# Patient Record
Sex: MALE | Race: WHITE | NOT HISPANIC OR LATINO | Employment: FULL TIME | URBAN - METROPOLITAN AREA
[De-identification: names, ages, dates, MRNs, and addresses within clinical notes are randomized per-mention and may not be internally consistent; named-entity substitution may affect disease eponyms.]

---

## 2017-03-06 ENCOUNTER — COMMUNICATION - HEALTHEAST (OUTPATIENT)
Dept: CARDIOLOGY | Facility: CLINIC | Age: 57
End: 2017-03-06

## 2017-03-07 ENCOUNTER — OFFICE VISIT - HEALTHEAST (OUTPATIENT)
Dept: FAMILY MEDICINE | Facility: CLINIC | Age: 57
End: 2017-03-07

## 2017-03-07 DIAGNOSIS — R55 SYNCOPE: ICD-10-CM

## 2017-03-07 DIAGNOSIS — I25.10 CARDIOVASCULAR DISEASE: ICD-10-CM

## 2017-03-07 DIAGNOSIS — R06.83 SNORING: ICD-10-CM

## 2017-03-15 ENCOUNTER — HOSPITAL ENCOUNTER (OUTPATIENT)
Dept: NUCLEAR MEDICINE | Facility: CLINIC | Age: 57
Discharge: HOME | End: 2017-03-15
Attending: FAMILY MEDICINE

## 2017-03-15 ENCOUNTER — HOSPITAL ENCOUNTER (OUTPATIENT)
Dept: CARDIOLOGY | Facility: CLINIC | Age: 57
Discharge: HOME | End: 2017-03-15
Attending: FAMILY MEDICINE

## 2017-03-15 ENCOUNTER — COMMUNICATION - HEALTHEAST (OUTPATIENT)
Dept: TELEHEALTH | Facility: CLINIC | Age: 57
End: 2017-03-15

## 2017-03-15 ENCOUNTER — AMBULATORY - HEALTHEAST (OUTPATIENT)
Dept: FAMILY MEDICINE | Facility: CLINIC | Age: 57
End: 2017-03-15

## 2017-03-15 ENCOUNTER — COMMUNICATION - HEALTHEAST (OUTPATIENT)
Dept: ADMINISTRATIVE | Facility: CLINIC | Age: 57
End: 2017-03-15

## 2017-03-15 DIAGNOSIS — R94.39 ABNORMAL STRESS TEST: ICD-10-CM

## 2017-03-15 DIAGNOSIS — I25.10 CARDIOVASCULAR DISEASE: ICD-10-CM

## 2017-03-15 LAB
CV STRESS CURRENT BP HE: NORMAL
CV STRESS CURRENT HR HE: 101
CV STRESS CURRENT HR HE: 102
CV STRESS CURRENT HR HE: 103
CV STRESS CURRENT HR HE: 103
CV STRESS CURRENT HR HE: 108
CV STRESS CURRENT HR HE: 118
CV STRESS CURRENT HR HE: 118
CV STRESS CURRENT HR HE: 120
CV STRESS CURRENT HR HE: 124
CV STRESS CURRENT HR HE: 125
CV STRESS CURRENT HR HE: 127
CV STRESS CURRENT HR HE: 137
CV STRESS CURRENT HR HE: 137
CV STRESS CURRENT HR HE: 138
CV STRESS CURRENT HR HE: 146
CV STRESS CURRENT HR HE: 147
CV STRESS CURRENT HR HE: 147
CV STRESS CURRENT HR HE: 152
CV STRESS CURRENT HR HE: 152
CV STRESS CURRENT HR HE: 62
CV STRESS CURRENT HR HE: 66
CV STRESS CURRENT HR HE: 79
CV STRESS CURRENT HR HE: 84
CV STRESS CURRENT HR HE: 86
CV STRESS CURRENT HR HE: 86
CV STRESS CURRENT HR HE: 87
CV STRESS CURRENT HR HE: 88
CV STRESS CURRENT HR HE: 90
CV STRESS CURRENT HR HE: 91
CV STRESS CURRENT HR HE: 95
CV STRESS CURRENT HR HE: 99
CV STRESS DEVIATION TIME HE: NORMAL
CV STRESS ECHO PERCENT HR HE: NORMAL
CV STRESS EXERCISE STAGE HE: NORMAL
CV STRESS EXERCISE STAGE REACHED HE: NORMAL
CV STRESS FINAL RESTING BP HE: NORMAL
CV STRESS FINAL RESTING HR HE: 88
CV STRESS MAX HR HE: 152
CV STRESS MAX TREADMILL GRADE HE: 16
CV STRESS MAX TREADMILL SPEED HE: 4.2
CV STRESS PEAK DIA BP HE: NORMAL
CV STRESS PEAK SYS BP HE: NORMAL
CV STRESS PHASE HE: NORMAL
CV STRESS PROTOCOL HE: NORMAL
CV STRESS RESTING PT POSITION HE: NORMAL
CV STRESS RESTING PT POSITION HE: NORMAL
CV STRESS ST DEVIATION AMOUNT HE: NORMAL
CV STRESS ST DEVIATION ELEVATION HE: NORMAL
CV STRESS ST EVELATION AMOUNT HE: NORMAL
CV STRESS TEST TYPE HE: NORMAL
CV STRESS TOTAL STAGE TIME MIN 1 HE: NORMAL
NUC STRESS EJECTION FRACTION: 59 %
STRESS ECHO BASELINE BP: NORMAL
STRESS ECHO BASELINE HR: 64
STRESS ECHO CALCULATED PERCENT HR: 94 %
STRESS ECHO LAST STRESS BP: NORMAL
STRESS ECHO LAST STRESS HR: 152
STRESS ECHO POST ESTIMATED WORKLOAD: 12.1
STRESS ECHO POST EXERCISE DUR MIN: 11
STRESS ECHO POST EXERCISE DUR SEC: 59
STRESS ECHO TARGET HR: 138

## 2017-03-16 ENCOUNTER — COMMUNICATION - HEALTHEAST (OUTPATIENT)
Dept: FAMILY MEDICINE | Facility: CLINIC | Age: 57
End: 2017-03-16

## 2017-03-16 ENCOUNTER — OFFICE VISIT - HEALTHEAST (OUTPATIENT)
Dept: CARDIOLOGY | Facility: CLINIC | Age: 57
End: 2017-03-16

## 2017-03-16 DIAGNOSIS — I25.10 CORONARY ARTERY DISEASE INVOLVING NATIVE CORONARY ARTERY OF NATIVE HEART WITHOUT ANGINA PECTORIS: ICD-10-CM

## 2017-03-16 DIAGNOSIS — R55 SYNCOPE: ICD-10-CM

## 2017-03-16 DIAGNOSIS — E78.2 MIXED HYPERLIPIDEMIA: ICD-10-CM

## 2017-03-16 DIAGNOSIS — I10 ESSENTIAL HYPERTENSION: ICD-10-CM

## 2017-03-16 DIAGNOSIS — R94.39 ABNORMAL NUCLEAR STRESS TEST: ICD-10-CM

## 2017-03-16 ASSESSMENT — MIFFLIN-ST. JEOR: SCORE: 1733.86

## 2017-03-30 ENCOUNTER — COMMUNICATION - HEALTHEAST (OUTPATIENT)
Dept: CARDIOLOGY | Facility: CLINIC | Age: 57
End: 2017-03-30

## 2017-04-04 ENCOUNTER — HOSPITAL ENCOUNTER (OUTPATIENT)
Dept: CT IMAGING | Facility: CLINIC | Age: 57
Discharge: HOME | End: 2017-04-04
Attending: INTERNAL MEDICINE

## 2017-04-04 DIAGNOSIS — R94.39 ABNORMAL NUCLEAR STRESS TEST: ICD-10-CM

## 2017-04-04 LAB
BSA FOR ECHO PROCEDURE: 2.09 M2
Lab: 4.1 CM

## 2017-04-04 ASSESSMENT — MIFFLIN-ST. JEOR: SCORE: 1690.76

## 2017-04-07 ENCOUNTER — COMMUNICATION - HEALTHEAST (OUTPATIENT)
Dept: CARDIOLOGY | Facility: CLINIC | Age: 57
End: 2017-04-07

## 2017-04-07 DIAGNOSIS — I25.10 CAD (CORONARY ARTERY DISEASE): ICD-10-CM

## 2017-04-10 ENCOUNTER — SURGERY - HEALTHEAST (OUTPATIENT)
Dept: CARDIOLOGY | Facility: CLINIC | Age: 57
End: 2017-04-10

## 2017-04-10 ENCOUNTER — COMMUNICATION - HEALTHEAST (OUTPATIENT)
Dept: CARDIOLOGY | Facility: CLINIC | Age: 57
End: 2017-04-10

## 2017-04-14 ENCOUNTER — COMMUNICATION - HEALTHEAST (OUTPATIENT)
Dept: CARDIOLOGY | Facility: CLINIC | Age: 57
End: 2017-04-14

## 2017-04-14 ENCOUNTER — SURGERY - HEALTHEAST (OUTPATIENT)
Dept: CARDIOLOGY | Facility: CLINIC | Age: 57
End: 2017-04-14

## 2017-04-14 DIAGNOSIS — I25.10 CAD (CORONARY ARTERY DISEASE): ICD-10-CM

## 2017-04-14 ASSESSMENT — MIFFLIN-ST. JEOR: SCORE: 1717.13

## 2017-04-21 ENCOUNTER — OFFICE VISIT - HEALTHEAST (OUTPATIENT)
Dept: FAMILY MEDICINE | Facility: CLINIC | Age: 57
End: 2017-04-21

## 2017-04-21 ENCOUNTER — OFFICE VISIT - HEALTHEAST (OUTPATIENT)
Dept: SLEEP MEDICINE | Facility: CLINIC | Age: 57
End: 2017-04-21

## 2017-04-21 DIAGNOSIS — G47.8 SLEEP DYSFUNCTION WITH SLEEP STAGE DISTURBANCE: ICD-10-CM

## 2017-04-21 DIAGNOSIS — G47.10 HYPERSOMNIA, UNSPECIFIED: ICD-10-CM

## 2017-04-21 DIAGNOSIS — I25.10 CAD (CORONARY ARTERY DISEASE): ICD-10-CM

## 2017-04-21 DIAGNOSIS — K76.9 LIVER LESION: ICD-10-CM

## 2017-04-21 DIAGNOSIS — R06.83 SNORING: ICD-10-CM

## 2017-04-21 DIAGNOSIS — R29.818 SUSPECTED SLEEP APNEA: ICD-10-CM

## 2017-04-21 ASSESSMENT — MIFFLIN-ST. JEOR
SCORE: 1720.71
SCORE: 1720.25

## 2017-05-03 ENCOUNTER — HOSPITAL ENCOUNTER (OUTPATIENT)
Dept: MRI IMAGING | Facility: CLINIC | Age: 57
Discharge: HOME | End: 2017-05-03
Attending: FAMILY MEDICINE

## 2017-05-03 DIAGNOSIS — K76.9 LIVER LESION: ICD-10-CM

## 2017-05-08 ENCOUNTER — COMMUNICATION - HEALTHEAST (OUTPATIENT)
Dept: SLEEP MEDICINE | Facility: CLINIC | Age: 57
End: 2017-05-08

## 2017-05-08 DIAGNOSIS — R29.818 SUSPECTED SLEEP APNEA: ICD-10-CM

## 2017-05-08 DIAGNOSIS — G47.10 HYPERSOMNIA: ICD-10-CM

## 2017-05-23 ENCOUNTER — OFFICE VISIT - HEALTHEAST (OUTPATIENT)
Dept: FAMILY MEDICINE | Facility: CLINIC | Age: 57
End: 2017-05-23

## 2017-05-23 DIAGNOSIS — B35.1 ONYCHOMYCOSIS: ICD-10-CM

## 2017-05-23 DIAGNOSIS — L60.0 INGROWN NAIL: ICD-10-CM

## 2017-05-23 DIAGNOSIS — I25.10 CORONARY ARTERY DISEASE INVOLVING NATIVE CORONARY ARTERY OF NATIVE HEART, ANGINA PRESENCE UNSPECIFIED: ICD-10-CM

## 2017-05-23 ASSESSMENT — MIFFLIN-ST. JEOR: SCORE: 1729.32

## 2017-06-01 ENCOUNTER — RECORDS - HEALTHEAST (OUTPATIENT)
Dept: SLEEP MEDICINE | Facility: CLINIC | Age: 57
End: 2017-06-01

## 2017-06-01 DIAGNOSIS — G47.10 HYPERSOMNIA, UNSPECIFIED: ICD-10-CM

## 2017-06-01 DIAGNOSIS — G47.30 SLEEP APNEA, UNSPECIFIED: ICD-10-CM

## 2017-06-05 ENCOUNTER — COMMUNICATION - HEALTHEAST (OUTPATIENT)
Dept: SLEEP MEDICINE | Facility: CLINIC | Age: 57
End: 2017-06-05

## 2017-06-05 DIAGNOSIS — G47.33 OBSTRUCTIVE SLEEP APNEA: ICD-10-CM

## 2017-06-05 DIAGNOSIS — G47.10 HYPERSOMNIA: ICD-10-CM

## 2017-06-06 ENCOUNTER — COMMUNICATION - HEALTHEAST (OUTPATIENT)
Dept: SLEEP MEDICINE | Facility: CLINIC | Age: 57
End: 2017-06-06

## 2017-06-06 DIAGNOSIS — G47.33 OBSTRUCTIVE SLEEP APNEA: ICD-10-CM

## 2017-06-21 ENCOUNTER — RECORDS - HEALTHEAST (OUTPATIENT)
Dept: SLEEP MEDICINE | Facility: CLINIC | Age: 57
End: 2017-06-21

## 2017-06-21 DIAGNOSIS — G47.33 OBSTRUCTIVE SLEEP APNEA (ADULT) (PEDIATRIC): ICD-10-CM

## 2017-06-21 DIAGNOSIS — G47.10 HYPERSOMNIA, UNSPECIFIED: ICD-10-CM

## 2017-06-26 ENCOUNTER — COMMUNICATION - HEALTHEAST (OUTPATIENT)
Dept: SLEEP MEDICINE | Facility: CLINIC | Age: 57
End: 2017-06-26

## 2017-06-26 ENCOUNTER — AMBULATORY - HEALTHEAST (OUTPATIENT)
Dept: SLEEP MEDICINE | Facility: CLINIC | Age: 57
End: 2017-06-26

## 2017-06-26 DIAGNOSIS — G47.33 OBSTRUCTIVE SLEEP APNEA: ICD-10-CM

## 2017-06-26 DIAGNOSIS — G47.31 CENTRAL SLEEP APNEA: ICD-10-CM

## 2017-07-06 ENCOUNTER — COMMUNICATION - HEALTHEAST (OUTPATIENT)
Dept: SLEEP MEDICINE | Facility: CLINIC | Age: 57
End: 2017-07-06

## 2017-08-08 ENCOUNTER — OFFICE VISIT - HEALTHEAST (OUTPATIENT)
Dept: CARDIOLOGY | Facility: CLINIC | Age: 57
End: 2017-08-08

## 2017-08-08 DIAGNOSIS — I10 ESSENTIAL HYPERTENSION: ICD-10-CM

## 2017-08-08 DIAGNOSIS — I25.84 CORONARY ARTERY DISEASE DUE TO CALCIFIED CORONARY LESION: ICD-10-CM

## 2017-08-08 DIAGNOSIS — I25.10 CORONARY ARTERY DISEASE DUE TO CALCIFIED CORONARY LESION: ICD-10-CM

## 2017-08-08 DIAGNOSIS — E78.2 MIXED HYPERLIPIDEMIA: ICD-10-CM

## 2017-08-08 ASSESSMENT — MIFFLIN-ST. JEOR: SCORE: 1733.86

## 2017-08-11 ENCOUNTER — OFFICE VISIT - HEALTHEAST (OUTPATIENT)
Dept: SLEEP MEDICINE | Facility: CLINIC | Age: 57
End: 2017-08-11

## 2017-08-11 DIAGNOSIS — G47.8 SLEEP DYSFUNCTION WITH SLEEP STAGE DISTURBANCE: ICD-10-CM

## 2017-08-11 DIAGNOSIS — R06.3 CENTRAL SLEEP APNEA DUE TO CHEYNE-STOKES RESPIRATION: ICD-10-CM

## 2017-08-11 DIAGNOSIS — G47.33 OSA (OBSTRUCTIVE SLEEP APNEA): ICD-10-CM

## 2017-08-11 DIAGNOSIS — G47.10 HYPERSOMNIA, UNSPECIFIED: ICD-10-CM

## 2017-08-11 ASSESSMENT — MIFFLIN-ST. JEOR: SCORE: 1721.16

## 2017-08-16 ENCOUNTER — COMMUNICATION - HEALTHEAST (OUTPATIENT)
Dept: FAMILY MEDICINE | Facility: CLINIC | Age: 57
End: 2017-08-16

## 2017-08-19 ENCOUNTER — RECORDS - HEALTHEAST (OUTPATIENT)
Dept: ADMINISTRATIVE | Facility: OTHER | Age: 57
End: 2017-08-19

## 2017-08-25 ENCOUNTER — OFFICE VISIT - HEALTHEAST (OUTPATIENT)
Dept: SLEEP MEDICINE | Facility: CLINIC | Age: 57
End: 2017-08-25

## 2017-08-25 DIAGNOSIS — G47.31 CENTRAL SLEEP APNEA: ICD-10-CM

## 2017-08-25 DIAGNOSIS — G47.33 OSA (OBSTRUCTIVE SLEEP APNEA): ICD-10-CM

## 2017-08-25 DIAGNOSIS — G47.10 HYPERSOMNIA, UNSPECIFIED: ICD-10-CM

## 2017-08-25 ASSESSMENT — MIFFLIN-ST. JEOR: SCORE: 1738.4

## 2017-10-07 ENCOUNTER — COMMUNICATION - HEALTHEAST (OUTPATIENT)
Dept: FAMILY MEDICINE | Facility: CLINIC | Age: 57
End: 2017-10-07

## 2017-10-07 DIAGNOSIS — E78.5 HYPERLIPIDEMIA: ICD-10-CM

## 2017-10-19 ENCOUNTER — OFFICE VISIT - HEALTHEAST (OUTPATIENT)
Dept: SLEEP MEDICINE | Facility: CLINIC | Age: 57
End: 2017-10-19

## 2017-10-19 DIAGNOSIS — G47.10 HYPERSOMNIA: ICD-10-CM

## 2017-10-19 DIAGNOSIS — G47.33 OSA (OBSTRUCTIVE SLEEP APNEA): ICD-10-CM

## 2017-10-19 DIAGNOSIS — G47.34 SLEEP RELATED HYPOXIA: ICD-10-CM

## 2017-10-19 DIAGNOSIS — R06.3 CENTRAL SLEEP APNEA DUE TO CHEYNE-STOKES RESPIRATION: ICD-10-CM

## 2017-10-19 ASSESSMENT — MIFFLIN-ST. JEOR: SCORE: 1754.73

## 2017-11-09 ENCOUNTER — COMMUNICATION - HEALTHEAST (OUTPATIENT)
Dept: SLEEP MEDICINE | Facility: CLINIC | Age: 57
End: 2017-11-09

## 2018-03-09 ENCOUNTER — OFFICE VISIT - HEALTHEAST (OUTPATIENT)
Dept: FAMILY MEDICINE | Facility: CLINIC | Age: 58
End: 2018-03-09

## 2018-03-09 DIAGNOSIS — R73.01 IMPAIRED FASTING GLUCOSE: ICD-10-CM

## 2018-03-09 DIAGNOSIS — E78.5 HYPERLIPIDEMIA: ICD-10-CM

## 2018-03-09 DIAGNOSIS — I25.10 CAD (CORONARY ARTERY DISEASE): ICD-10-CM

## 2018-03-09 DIAGNOSIS — R60.9 EDEMA: ICD-10-CM

## 2018-03-09 LAB
ALBUMIN SERPL-MCNC: 3.9 G/DL (ref 3.5–5)
ALP SERPL-CCNC: 100 U/L (ref 45–120)
ALT SERPL W P-5'-P-CCNC: 24 U/L (ref 0–45)
ANION GAP SERPL CALCULATED.3IONS-SCNC: 8 MMOL/L (ref 5–18)
AST SERPL W P-5'-P-CCNC: 22 U/L (ref 0–40)
BILIRUB SERPL-MCNC: 1.1 MG/DL (ref 0–1)
BNP SERPL-MCNC: 140 PG/ML (ref 0–51)
BUN SERPL-MCNC: 17 MG/DL (ref 8–22)
CALCIUM SERPL-MCNC: 9.2 MG/DL (ref 8.5–10.5)
CHLORIDE BLD-SCNC: 110 MMOL/L (ref 98–107)
CHOLEST SERPL-MCNC: 145 MG/DL
CO2 SERPL-SCNC: 26 MMOL/L (ref 22–31)
CREAT SERPL-MCNC: 0.96 MG/DL (ref 0.7–1.3)
ERYTHROCYTE [DISTWIDTH] IN BLOOD BY AUTOMATED COUNT: 13.3 % (ref 11–14.5)
FASTING STATUS PATIENT QL REPORTED: YES
GFR SERPL CREATININE-BSD FRML MDRD: >60 ML/MIN/1.73M2
GLUCOSE BLD-MCNC: 99 MG/DL (ref 70–125)
HCT VFR BLD AUTO: 41.7 % (ref 40–54)
HDLC SERPL-MCNC: 37 MG/DL
HGB BLD-MCNC: 14.3 G/DL (ref 14–18)
LDLC SERPL CALC-MCNC: 96 MG/DL
MCH RBC QN AUTO: 30.2 PG (ref 27–34)
MCHC RBC AUTO-ENTMCNC: 34.2 G/DL (ref 32–36)
MCV RBC AUTO: 88 FL (ref 80–100)
PLATELET # BLD AUTO: 184 THOU/UL (ref 140–440)
PMV BLD AUTO: 8.2 FL (ref 7–10)
POTASSIUM BLD-SCNC: 4.8 MMOL/L (ref 3.5–5)
PROT SERPL-MCNC: 6.9 G/DL (ref 6–8)
RBC # BLD AUTO: 4.73 MILL/UL (ref 4.4–6.2)
SODIUM SERPL-SCNC: 144 MMOL/L (ref 136–145)
TRIGL SERPL-MCNC: 60 MG/DL
WBC: 4.9 THOU/UL (ref 4–11)

## 2018-03-12 ENCOUNTER — COMMUNICATION - HEALTHEAST (OUTPATIENT)
Dept: FAMILY MEDICINE | Facility: CLINIC | Age: 58
End: 2018-03-12

## 2018-04-19 ENCOUNTER — COMMUNICATION - HEALTHEAST (OUTPATIENT)
Dept: CARDIOLOGY | Facility: CLINIC | Age: 58
End: 2018-04-19

## 2018-04-19 DIAGNOSIS — I25.10 CAD (CORONARY ARTERY DISEASE): ICD-10-CM

## 2018-04-20 ENCOUNTER — COMMUNICATION - HEALTHEAST (OUTPATIENT)
Dept: CARDIOLOGY | Facility: CLINIC | Age: 58
End: 2018-04-20

## 2018-05-09 ENCOUNTER — COMMUNICATION - HEALTHEAST (OUTPATIENT)
Dept: ADMINISTRATIVE | Facility: CLINIC | Age: 58
End: 2018-05-09

## 2018-05-25 ENCOUNTER — RECORDS - HEALTHEAST (OUTPATIENT)
Dept: ADMINISTRATIVE | Facility: OTHER | Age: 58
End: 2018-05-25

## 2018-06-02 ENCOUNTER — COMMUNICATION - HEALTHEAST (OUTPATIENT)
Dept: CARDIOLOGY | Facility: CLINIC | Age: 58
End: 2018-06-02

## 2018-06-02 DIAGNOSIS — I10 ESSENTIAL HYPERTENSION: ICD-10-CM

## 2018-07-31 ENCOUNTER — COMMUNICATION - HEALTHEAST (OUTPATIENT)
Dept: FAMILY MEDICINE | Facility: CLINIC | Age: 58
End: 2018-07-31

## 2018-07-31 DIAGNOSIS — E78.5 HYPERLIPIDEMIA: ICD-10-CM

## 2018-08-08 ENCOUNTER — RECORDS - HEALTHEAST (OUTPATIENT)
Dept: ADMINISTRATIVE | Facility: OTHER | Age: 58
End: 2018-08-08

## 2018-08-20 ENCOUNTER — OFFICE VISIT - HEALTHEAST (OUTPATIENT)
Dept: CARDIOLOGY | Facility: CLINIC | Age: 58
End: 2018-08-20

## 2018-08-20 DIAGNOSIS — I25.10 CORONARY ARTERY DISEASE INVOLVING NATIVE CORONARY ARTERY OF NATIVE HEART WITHOUT ANGINA PECTORIS: ICD-10-CM

## 2018-08-20 DIAGNOSIS — E78.2 MIXED HYPERLIPIDEMIA: ICD-10-CM

## 2018-08-20 DIAGNOSIS — I10 ESSENTIAL HYPERTENSION: ICD-10-CM

## 2018-08-20 RX ORDER — BACLOFEN 20 MG
500 TABLET ORAL DAILY
Status: SHIPPED | COMMUNITY
Start: 2018-08-20 | End: 2022-09-29

## 2018-08-20 RX ORDER — VITAMIN E 268 MG
400 CAPSULE ORAL DAILY
Status: SHIPPED | COMMUNITY
Start: 2018-08-20 | End: 2022-09-29

## 2018-08-20 ASSESSMENT — MIFFLIN-ST. JEOR: SCORE: 1761.08

## 2018-10-10 ENCOUNTER — COMMUNICATION - HEALTHEAST (OUTPATIENT)
Dept: CARDIOLOGY | Facility: CLINIC | Age: 58
End: 2018-10-10

## 2018-10-10 DIAGNOSIS — I25.10 CAD (CORONARY ARTERY DISEASE): ICD-10-CM

## 2019-01-19 ENCOUNTER — COMMUNICATION - HEALTHEAST (OUTPATIENT)
Dept: FAMILY MEDICINE | Facility: CLINIC | Age: 59
End: 2019-01-19

## 2019-01-19 DIAGNOSIS — I10 ESSENTIAL HYPERTENSION: ICD-10-CM

## 2019-02-01 ENCOUNTER — COMMUNICATION - HEALTHEAST (OUTPATIENT)
Dept: FAMILY MEDICINE | Facility: CLINIC | Age: 59
End: 2019-02-01

## 2019-02-01 DIAGNOSIS — E78.5 HYPERLIPIDEMIA: ICD-10-CM

## 2019-04-26 ENCOUNTER — COMMUNICATION - HEALTHEAST (OUTPATIENT)
Dept: FAMILY MEDICINE | Facility: CLINIC | Age: 59
End: 2019-04-26

## 2019-04-26 DIAGNOSIS — I10 ESSENTIAL HYPERTENSION: ICD-10-CM

## 2019-05-03 ENCOUNTER — COMMUNICATION - HEALTHEAST (OUTPATIENT)
Dept: FAMILY MEDICINE | Facility: CLINIC | Age: 59
End: 2019-05-03

## 2019-05-03 DIAGNOSIS — E78.5 HYPERLIPIDEMIA: ICD-10-CM

## 2019-07-12 ENCOUNTER — COMMUNICATION - HEALTHEAST (OUTPATIENT)
Dept: CARDIOLOGY | Facility: CLINIC | Age: 59
End: 2019-07-12

## 2019-07-12 DIAGNOSIS — I25.10 CAD (CORONARY ARTERY DISEASE): ICD-10-CM

## 2019-08-03 ENCOUNTER — RECORDS - HEALTHEAST (OUTPATIENT)
Dept: ADMINISTRATIVE | Facility: OTHER | Age: 59
End: 2019-08-03

## 2019-09-07 ENCOUNTER — COMMUNICATION - HEALTHEAST (OUTPATIENT)
Dept: FAMILY MEDICINE | Facility: CLINIC | Age: 59
End: 2019-09-07

## 2019-09-07 DIAGNOSIS — E78.5 HYPERLIPIDEMIA: ICD-10-CM

## 2019-09-19 ENCOUNTER — OFFICE VISIT - HEALTHEAST (OUTPATIENT)
Dept: FAMILY MEDICINE | Facility: CLINIC | Age: 59
End: 2019-09-19

## 2019-09-19 ENCOUNTER — RECORDS - HEALTHEAST (OUTPATIENT)
Dept: GENERAL RADIOLOGY | Facility: CLINIC | Age: 59
End: 2019-09-19

## 2019-09-19 DIAGNOSIS — M54.50 ACUTE BILATERAL LOW BACK PAIN WITHOUT SCIATICA: ICD-10-CM

## 2019-09-19 DIAGNOSIS — M54.50 LOW BACK PAIN: ICD-10-CM

## 2019-09-19 RX ORDER — OXYCODONE AND ACETAMINOPHEN 5; 325 MG/1; MG/1
1 TABLET ORAL EVERY 6 HOURS PRN
Qty: 12 TABLET | Refills: 0 | Status: SHIPPED | OUTPATIENT
Start: 2019-09-19 | End: 2021-09-20

## 2019-10-10 ENCOUNTER — COMMUNICATION - HEALTHEAST (OUTPATIENT)
Dept: FAMILY MEDICINE | Facility: CLINIC | Age: 59
End: 2019-10-10

## 2019-10-10 ENCOUNTER — COMMUNICATION - HEALTHEAST (OUTPATIENT)
Dept: CARDIOLOGY | Facility: CLINIC | Age: 59
End: 2019-10-10

## 2019-10-10 DIAGNOSIS — I25.10 CAD (CORONARY ARTERY DISEASE): ICD-10-CM

## 2019-10-10 DIAGNOSIS — E78.5 HYPERLIPIDEMIA: ICD-10-CM

## 2019-12-26 ENCOUNTER — COMMUNICATION - HEALTHEAST (OUTPATIENT)
Dept: CARDIOLOGY | Facility: CLINIC | Age: 59
End: 2019-12-26

## 2019-12-26 DIAGNOSIS — I25.10 CAD (CORONARY ARTERY DISEASE): ICD-10-CM

## 2019-12-26 DIAGNOSIS — I10 ESSENTIAL HYPERTENSION: ICD-10-CM

## 2020-01-16 ENCOUNTER — OFFICE VISIT - HEALTHEAST (OUTPATIENT)
Dept: FAMILY MEDICINE | Facility: CLINIC | Age: 60
End: 2020-01-16

## 2020-01-16 ENCOUNTER — AMBULATORY - HEALTHEAST (OUTPATIENT)
Dept: FAMILY MEDICINE | Facility: CLINIC | Age: 60
End: 2020-01-16

## 2020-01-16 ENCOUNTER — RECORDS - HEALTHEAST (OUTPATIENT)
Dept: GENERAL RADIOLOGY | Facility: CLINIC | Age: 60
End: 2020-01-16

## 2020-01-16 DIAGNOSIS — M25.552 HIP PAIN, LEFT: ICD-10-CM

## 2020-01-16 DIAGNOSIS — M25.552 PAIN IN LEFT HIP: ICD-10-CM

## 2020-01-16 RX ORDER — HYDROCODONE BITARTRATE AND ACETAMINOPHEN 5; 325 MG/1; MG/1
1 TABLET ORAL EVERY 6 HOURS PRN
Qty: 10 TABLET | Refills: 0 | Status: SHIPPED | OUTPATIENT
Start: 2020-01-16 | End: 2021-09-20

## 2020-02-04 ENCOUNTER — COMMUNICATION - HEALTHEAST (OUTPATIENT)
Dept: FAMILY MEDICINE | Facility: CLINIC | Age: 60
End: 2020-02-04

## 2020-02-04 DIAGNOSIS — M25.552 HIP PAIN, LEFT: ICD-10-CM

## 2020-03-15 ENCOUNTER — COMMUNICATION - HEALTHEAST (OUTPATIENT)
Dept: FAMILY MEDICINE | Facility: CLINIC | Age: 60
End: 2020-03-15

## 2020-03-15 DIAGNOSIS — E78.5 HYPERLIPIDEMIA: ICD-10-CM

## 2020-04-03 ENCOUNTER — COMMUNICATION - HEALTHEAST (OUTPATIENT)
Dept: FAMILY MEDICINE | Facility: CLINIC | Age: 60
End: 2020-04-03

## 2020-04-03 DIAGNOSIS — M54.50 ACUTE BILATERAL LOW BACK PAIN WITHOUT SCIATICA: ICD-10-CM

## 2020-06-13 ENCOUNTER — COMMUNICATION - HEALTHEAST (OUTPATIENT)
Dept: CARDIOLOGY | Facility: CLINIC | Age: 60
End: 2020-06-13

## 2020-06-13 DIAGNOSIS — I25.10 CAD (CORONARY ARTERY DISEASE): ICD-10-CM

## 2020-06-13 DIAGNOSIS — I10 ESSENTIAL HYPERTENSION: ICD-10-CM

## 2020-07-17 ENCOUNTER — RECORDS - HEALTHEAST (OUTPATIENT)
Dept: ADMINISTRATIVE | Facility: OTHER | Age: 60
End: 2020-07-17

## 2020-08-04 ENCOUNTER — COMMUNICATION - HEALTHEAST (OUTPATIENT)
Dept: FAMILY MEDICINE | Facility: CLINIC | Age: 60
End: 2020-08-04

## 2020-08-04 DIAGNOSIS — M54.50 ACUTE BILATERAL LOW BACK PAIN WITHOUT SCIATICA: ICD-10-CM

## 2020-08-05 RX ORDER — CYCLOBENZAPRINE HCL 10 MG
TABLET ORAL
Qty: 10 TABLET | Refills: 0 | Status: SHIPPED | OUTPATIENT
Start: 2020-08-05 | End: 2021-07-30

## 2020-10-29 ENCOUNTER — RECORDS - HEALTHEAST (OUTPATIENT)
Dept: ADMINISTRATIVE | Facility: OTHER | Age: 60
End: 2020-10-29

## 2020-12-10 ENCOUNTER — COMMUNICATION - HEALTHEAST (OUTPATIENT)
Dept: CARDIOLOGY | Facility: CLINIC | Age: 60
End: 2020-12-10

## 2020-12-10 DIAGNOSIS — I10 ESSENTIAL HYPERTENSION: ICD-10-CM

## 2020-12-10 DIAGNOSIS — E78.5 HYPERLIPIDEMIA: ICD-10-CM

## 2020-12-10 DIAGNOSIS — I25.10 CAD (CORONARY ARTERY DISEASE): ICD-10-CM

## 2020-12-10 RX ORDER — ATORVASTATIN CALCIUM 80 MG/1
80 TABLET, FILM COATED ORAL DAILY
Qty: 90 TABLET | Refills: 0 | Status: SHIPPED | OUTPATIENT
Start: 2020-12-10 | End: 2021-07-23

## 2021-03-11 ENCOUNTER — COMMUNICATION - HEALTHEAST (OUTPATIENT)
Dept: FAMILY MEDICINE | Facility: CLINIC | Age: 61
End: 2021-03-11

## 2021-03-11 DIAGNOSIS — I10 ESSENTIAL HYPERTENSION: ICD-10-CM

## 2021-03-11 DIAGNOSIS — I25.10 CAD (CORONARY ARTERY DISEASE): ICD-10-CM

## 2021-03-18 ENCOUNTER — COMMUNICATION - HEALTHEAST (OUTPATIENT)
Dept: FAMILY MEDICINE | Facility: CLINIC | Age: 61
End: 2021-03-18

## 2021-03-18 DIAGNOSIS — M25.552 HIP PAIN, LEFT: ICD-10-CM

## 2021-03-19 RX ORDER — GABAPENTIN 300 MG/1
CAPSULE ORAL
Qty: 90 CAPSULE | Refills: 3 | Status: SHIPPED | OUTPATIENT
Start: 2021-03-19 | End: 2021-09-20

## 2021-05-28 NOTE — TELEPHONE ENCOUNTER
RN cannot approve Refill Request    RN can NOT refill this medication PCP messaged that patient is overdue for Office Visit.       Florence Nolan, [facility] Triage/Med Refill 5/3/2019    Requested Prescriptions   Pending Prescriptions Disp Refills     atorvastatin (LIPITOR) 80 MG tablet [Pharmacy Med Name: ATORVASTATIN 80MG TABLETS] 90 tablet 0     Sig: TAKE 1 TABLET BY MOUTH DAILY       Statins Refill Protocol (Hmg CoA Reductase Inhibitors) Failed - 5/3/2019  3:46 AM        Failed - PCP or prescribing provider visit in past 12 months      Last office visit with prescriber/PCP: 3/9/2018 Valentino Lu MD OR same dept: Visit date not found OR same specialty: 3/9/2018 Valentino Lu MD  Last physical: 10/24/2016 Last MTM visit: Visit date not found   Next visit within 3 mo: Visit date not found  Next physical within 3 mo: Visit date not found  Prescriber OR PCP: Valentino Lu MD  Last diagnosis associated with med order: 1. Hyperlipidemia  - atorvastatin (LIPITOR) 80 MG tablet [Pharmacy Med Name: ATORVASTATIN 80MG TABLETS]; TAKE 1 TABLET BY MOUTH DAILY  Dispense: 90 tablet; Refill: 0    If protocol passes may refill for 12 months if within 3 months of last provider visit (or a total of 15 months).

## 2021-05-28 NOTE — TELEPHONE ENCOUNTER
RN cannot approve Refill Request    RN can NOT refill this medication overdue for office visits and/or labs.    Valentino Chatman, [facility] Triage/Med Refill 4/29/2019    Requested Prescriptions   Pending Prescriptions Disp Refills     lisinopril (PRINIVIL,ZESTRIL) 20 MG tablet [Pharmacy Med Name: LISINOPRIL 20MG TABLETS] 90 tablet 0     Sig: TAKE 1 TABLET BY MOUTH DAILY       Ace Inhibitors Refill Protocol Failed - 4/26/2019  5:15 PM        Failed - PCP or prescribing provider visit in past 12 months       Last office visit with prescriber/PCP: 3/9/2018 Valentino Lu MD OR same dept: Visit date not found OR same specialty: 3/9/2018 Valentino Lu MD  Last physical: 10/24/2016 Last MTM visit: Visit date not found   Next visit within 3 mo: Visit date not found  Next physical within 3 mo: Visit date not found  Prescriber OR PCP: Valentino Lu MD  Last diagnosis associated with med order: 1. Essential hypertension  - lisinopril (PRINIVIL,ZESTRIL) 20 MG tablet [Pharmacy Med Name: LISINOPRIL 20MG TABLETS]; TAKE 1 TABLET BY MOUTH DAILY  Dispense: 90 tablet; Refill: 0    If protocol passes may refill for 12 months if within 3 months of last provider visit (or a total of 15 months).             Failed - Serum Potassium in past 12 months     No results found for: LN-POTASSIUM          Failed - Serum Creatinine in past 12 months     Creatinine   Date Value Ref Range Status   03/09/2018 0.96 0.70 - 1.30 mg/dL Final             Passed - Blood pressure filed in past 12 months     BP Readings from Last 1 Encounters:   08/20/18 122/72

## 2021-05-29 ENCOUNTER — RECORDS - HEALTHEAST (OUTPATIENT)
Dept: ADMINISTRATIVE | Facility: CLINIC | Age: 61
End: 2021-05-29

## 2021-05-30 VITALS — HEIGHT: 71 IN | WEIGHT: 198 LBS | BODY MASS INDEX: 27.72 KG/M2

## 2021-05-30 VITALS — WEIGHT: 201 LBS | HEIGHT: 71 IN | BODY MASS INDEX: 28.14 KG/M2

## 2021-05-30 VITALS — BODY MASS INDEX: 27.62 KG/M2 | HEIGHT: 71 IN | WEIGHT: 197.31 LBS

## 2021-05-30 VITALS — BODY MASS INDEX: 27.11 KG/M2 | WEIGHT: 194.38 LBS

## 2021-05-30 VITALS — WEIGHT: 195 LBS | BODY MASS INDEX: 27.92 KG/M2 | HEIGHT: 70 IN

## 2021-05-30 VITALS — BODY MASS INDEX: 27.73 KG/M2 | HEIGHT: 71 IN | WEIGHT: 198.1 LBS

## 2021-05-31 ENCOUNTER — RECORDS - HEALTHEAST (OUTPATIENT)
Dept: ADMINISTRATIVE | Facility: CLINIC | Age: 61
End: 2021-05-31

## 2021-05-31 VITALS — WEIGHT: 201 LBS | BODY MASS INDEX: 28.14 KG/M2 | HEIGHT: 71 IN

## 2021-05-31 VITALS — HEIGHT: 71 IN | WEIGHT: 200 LBS | BODY MASS INDEX: 28 KG/M2

## 2021-05-31 VITALS — HEIGHT: 71 IN | WEIGHT: 198.2 LBS | BODY MASS INDEX: 27.75 KG/M2

## 2021-05-31 VITALS — BODY MASS INDEX: 28.28 KG/M2 | HEIGHT: 71 IN | WEIGHT: 202 LBS

## 2021-05-31 VITALS — WEIGHT: 205.6 LBS | BODY MASS INDEX: 28.78 KG/M2 | HEIGHT: 71 IN

## 2021-06-01 VITALS — BODY MASS INDEX: 28.98 KG/M2 | WEIGHT: 207 LBS | HEIGHT: 71 IN

## 2021-06-01 VITALS — BODY MASS INDEX: 28.45 KG/M2 | WEIGHT: 204 LBS

## 2021-06-01 NOTE — TELEPHONE ENCOUNTER
RN cannot approve Refill Request    RN can NOT refill this medication Protocol failed and NO refill given. Last office visit: 3/9/2018 Valentino Lu MD Last Physical: 10/24/2016 Last MTM visit: Visit date not found Last visit same specialty: 3/9/2018 Valentino Lu MD.  Next visit within 3 mo: Visit date not found  Next physical within 3 mo: Visit date not found      Nevaeh Dickens, [facility] Triage/Med Refill 9/8/2019    Requested Prescriptions   Pending Prescriptions Disp Refills     atorvastatin (LIPITOR) 80 MG tablet [Pharmacy Med Name: ATORVASTATIN 80MG TABLETS] 90 tablet 0     Sig: TAKE 1 TABLET BY MOUTH DAILY       Statins Refill Protocol (Hmg CoA Reductase Inhibitors) Failed - 9/7/2019  7:48 AM        Failed - PCP or prescribing provider visit in past 12 months      Last office visit with prescriber/PCP: 3/9/2018 Valentino Lu MD OR same dept: Visit date not found OR same specialty: 3/9/2018 Valentino Lu MD  Last physical: 10/24/2016 Last MTM visit: Visit date not found   Next visit within 3 mo: Visit date not found  Next physical within 3 mo: Visit date not found  Prescriber OR PCP: Valentino Lu MD  Last diagnosis associated with med order: 1. Hyperlipidemia  - atorvastatin (LIPITOR) 80 MG tablet [Pharmacy Med Name: ATORVASTATIN 80MG TABLETS]; TAKE 1 TABLET BY MOUTH DAILY  Dispense: 90 tablet; Refill: 0    If protocol passes may refill for 12 months if within 3 months of last provider visit (or a total of 15 months).

## 2021-06-01 NOTE — PROGRESS NOTES
ASSESSMENT/PLAN:  1. Acute bilateral low back pain without sciatica  60 yo male with acute episode of low back pain.  He has flares about once yearly.  Xray does not show fracture or abnormality. Pain appears musculoskeletal in nature. Encouraged to to use Ibuprofen for pain management. Given percocet #10 tabs for more severe pain to be used sparingly. Consulted Prescribers medication site and pt has not history of prior prescriptions.    Follow up with increasing or unresolving pain.  - XR Lumbar Spine 2 or 3 VWS; Future      Dragon dictation was used for this note.  Speech recognition errors are a possibility.    No follow-ups on file.  There are no Patient Instructions on file for this visit.    Orders Placed This Encounter   Procedures     XR Lumbar Spine 2 or 3 VWS     Standing Status:   Future     Number of Occurrences:   1     Standing Expiration Date:   9/19/2020     Order Specific Question:   Can the procedure be changed per Radiologist protocol?     Answer:   Yes     Medications Discontinued During This Encounter   Medication Reason     metoprolol succinate (TOPROL-XL) 50 MG 24 hr tablet Duplicate order     terbinafine HCl (LAMISIL) 250 mg tablet Therapy completed         CHIEF COMPLAINT;  Chief Complaint   Patient presents with     Back Pain       HISTORY OF PRESENT ILLNESS:  Minor is a 59 y.o. male presenting to the clinic today for back pain. Two weeks ago, he began experiencing back pain and spasms at work while sitting. His back has progressively tightened to the point that he needed to take two work days off. The first day, he fell to the floor and was unable to get up. He has treated his pain with Advil or Tylenol three times per day without any relief. He returned to work the next week with his pain continuously improving. He felt best yesterday and took his dog for a three mile walk. Last night, he woke up to severe pain. His pain occurs at the base of his spine and is centered around his hip. It  previously moved downward on his right side, but he denies pain radiating to his legs. Today he ranks his pain as 5/10.  He regularly lifts boxes at [employer] and denies changes in his usual physical activities. He says this pain occurs about once per year and is interested in narcotics for pain relief.    Remainder of 12-point ROS is negative.    TOBACCO USE:  Social History     Tobacco Use   Smoking Status Never Smoker   Smokeless Tobacco Never Used       VITALS:  Vitals:    09/19/19 1104 09/19/19 1107   BP: 140/80 130/80   Patient Site: Left Arm Left Arm   Patient Position: Sitting Sitting   Cuff Size: Adult Large Adult Large   Pulse: 70    SpO2: 98%    Weight: 197 lb 14.4 oz (89.8 kg)      Wt Readings from Last 3 Encounters:   09/19/19 197 lb 14.4 oz (89.8 kg)   08/20/18 207 lb (93.9 kg)   03/09/18 204 lb (92.5 kg)     Body mass index is 27.6 kg/m .    PHYSICAL EXAM:  GENERAL APPEARANCE: Alert, cooperative, no distress, appears stated age  HEAD: Normocephalic, without obvious abnormality, atraumatic  EYES:  PERRL, conjunctiva/corneas clear, EOM's intact, fundi     benign, both eyes       EARS: Normal TM's and external ear canals, both ears  NOSE:  Nares normal, septum midline, mucosa normal, no drainage or sinus tenderness  THROAT: Lips, mucosa, and tongue normal; teeth and gums normal  NECK: Supple, symmetrical, trachea midline, no adenopathy;    thyroid:  No enlargement/tenderness/nodules; no carotid    bruit or JVD  BACK: Symmetric, no curvature, ROM normal, no CVA tenderness. Normal gait and appearance. No midline or paraspinal muscle tenderness. Flexion and extension intact.  LUNGS: Clear to auscultation bilaterally, respirations unlabored  CHEST WALL: No tenderness or deformity  HEART: Regular rate and rhythm, S1 and S2 normal, no murmur, rub or gallop  ABDOMEN: Soft, non-tender, bowel sounds active all four quadrants,     no masses, no organomegaly  EXTREMITIES: Extremities normal, atraumatic, no cyanosis  or edema  PULSES: 2+ and symmetric all extremities  SKIN: Skin color, texture, turgor normal, no rashes or lesions  LYMPH NODES: Cervical, supraclavicular, and axillary nodes normal  NEUROLOGIC: CNII-XII intact. Normal strength, sensation and reflexes       Throughout    RECENT RESULTS  No results found for this or any previous visit (from the past 48 hour(s)).    MEDICATIONS:  Current Outpatient Medications   Medication Sig Dispense Refill     aspirin 81 MG EC tablet Take 81 mg by mouth daily.       atorvastatin (LIPITOR) 80 MG tablet TAKE 1 TABLET BY MOUTH DAILY 90 tablet 0     cholecalciferol, vitamin D3, 1,000 unit tablet Take 1,000 Units by mouth daily.       lisinopril (PRINIVIL,ZESTRIL) 20 MG tablet TAKE 1 TABLET BY MOUTH DAILY 90 tablet 2     magnesium oxide 500 mg Tab Take 500 mg by mouth daily.       metoprolol succinate (TOPROL-XL) 50 MG 24 hr tablet TAKE 1 TABLET(50 MG) BY MOUTH DAILY 90 tablet 3     nitroglycerin (NITROSTAT) 0.4 MG SL tablet ONE TABLET UNDER TONGUE AS NEEDED FOR CHEST PAIN MAY REPEAT EVERY 5 MINUTES X 2. IF NO RELIEF DIAL 911 25 tablet 1     vitamin E 400 unit capsule Take 400 Units by mouth daily.       No current facility-administered medications for this visit.      QUALITY MEASURES:  0    ADDITIONAL HISTORY SUMMARIZED (2): None.  DECISION TO OBTAIN EXTRA INFORMATION (1): None.   RADIOLOGY TESTS (1): Tests ordered.  LABS (1): None.  MEDICINE TESTS (1): None.  INDEPENDENT REVIEW (2 each): None.     The visit lasted a total of 8 minutes face to face with the patient. Over 50% of the time was spent counseling and educating the patient about back pain.    INaseem am scribing for and in the presence of, Dr. Barrios.    I, Dr. Barrios, personally performed the services described in this documentation, as scribed by Naseem Alejandro in my presence, and it is both accurate and complete.    Total data points: 1

## 2021-06-02 NOTE — TELEPHONE ENCOUNTER
Refill Approved    Rx renewed per Medication Renewal Policy. Medication was last renewed on 9/8/19.    Florence Nolan, [facility] Triage/Med Refill 10/11/2019     Requested Prescriptions   Pending Prescriptions Disp Refills     atorvastatin (LIPITOR) 80 MG tablet [Pharmacy Med Name: ATORVASTATIN 80MG TABLETS] 90 tablet 0     Sig: TAKE 1 TABLET BY MOUTH DAILY       Statins Refill Protocol (Hmg CoA Reductase Inhibitors) Passed - 10/10/2019  5:14 AM        Passed - PCP or prescribing provider visit in past 12 months      Last office visit with prescriber/PCP: 3/9/2018 Valnetino Lu MD OR same dept: 9/19/2019 Lyn Barrios MD OR same specialty: 9/19/2019 Lyn Barrios MD  Last physical: 10/24/2016 Last MTM visit: Visit date not found   Next visit within 3 mo: Visit date not found  Next physical within 3 mo: Visit date not found  Prescriber OR PCP: Valentino Lu MD  Last diagnosis associated with med order: 1. Hyperlipidemia  - atorvastatin (LIPITOR) 80 MG tablet [Pharmacy Med Name: ATORVASTATIN 80MG TABLETS]; TAKE 1 TABLET BY MOUTH DAILY  Dispense: 90 tablet; Refill: 0    If protocol passes may refill for 12 months if within 3 months of last provider visit (or a total of 15 months).

## 2021-06-03 VITALS
SYSTOLIC BLOOD PRESSURE: 130 MMHG | WEIGHT: 197.9 LBS | DIASTOLIC BLOOD PRESSURE: 80 MMHG | BODY MASS INDEX: 27.6 KG/M2 | HEART RATE: 70 BPM | OXYGEN SATURATION: 98 %

## 2021-06-04 VITALS
SYSTOLIC BLOOD PRESSURE: 140 MMHG | DIASTOLIC BLOOD PRESSURE: 72 MMHG | BODY MASS INDEX: 27.79 KG/M2 | OXYGEN SATURATION: 97 % | HEART RATE: 54 BPM | WEIGHT: 199.25 LBS | TEMPERATURE: 97.4 F

## 2021-06-04 NOTE — TELEPHONE ENCOUNTER
Refills Approved x 2     Rx renewed per Medication Renewal Policy. Medication was last renewed on   Lisinopril = 4/29/2019 with 2 refills  Metoprolol succinate = 10/10/2019   Last office visit: 9/19/2019 with DR VINAY Barrios @ [facility]    Karen JODEE Pozo, [facility] Triage/Med Refill 1/2/2020     Requested Prescriptions   Pending Prescriptions Disp Refills     metoprolol succinate (TOPROL-XL) 50 MG 24 hr tablet [Pharmacy Med Name: METOPROLOL ER SUCCINATE 50MG TABS] 90 tablet 0     Sig: TAKE 1 TABLET BY MOUTH DAILY       There is no refill protocol information for this order        lisinopril (PRINIVIL,ZESTRIL) 20 MG tablet [Pharmacy Med Name: LISINOPRIL 20MG TABLETS] 90 tablet 2     Sig: TAKE 1 TABLET BY MOUTH DAILY       There is no refill protocol information for this order

## 2021-06-05 NOTE — PROGRESS NOTES
Assessment/Plan:     1. Hip pain, left  HYDROcodone-acetaminophen 5-325 mg per tablet    gabapentin (NEURONTIN) 300 MG capsule       Diagnoses and all orders for this visit:    Hip pain, left  -     HYDROcodone-acetaminophen 5-325 mg per tablet; Take 1 tablet by mouth every 6 (six) hours as needed for pain.  Dispense: 10 tablet; Refill: 0  -     gabapentin (NEURONTIN) 300 MG capsule; Take 1 tablet by mouth at bedtime  Dispense: 30 capsule; Refill: 0       Discussed x-ray findings with patient.  There is presence of osteoarthritis.  Discussed that this is the likely cause of his pain.  He was recently treated for a lower back problem and his lower back is feeling better but we did consider the possibility that his hip pain could be related to his back issues.  He is reporting some numbness and tingling which  I recommended that he follow-up with his orthopedic surgeon to discuss possibly getting a cortisone injection and further treatment options.  He is primarily interested in getting something today that will give him some relief of pain and allow him to sleep at night.  He recently tried Percocet and cyclobenzaprine without relief.  He wonders if he could try a few tablets of Vicodin.  Did agree to prescribe prescription for 10 tablets of Vicodin.  Counseled him on use of this medication and side effects.  Recommended that he try ice as well.  He is avoiding NSAIDs due to underlying history of coronary artery disease.  We also will do a trial of gabapentin and he given a prescription for this and counseled on use of medications.        Subjective:      Minor Murcia is a 60 y.o. male who comes in today with concern about left hip pain.  He states that this has been going on for about a week.  He is on his feet a lot at work but states that he was not doing any activities out of the ordinary or overdoing it as far as physical activity goes.  He has not had any falls or injuries and really cannot think of a reason  as to why he is experiencing the hip pain.  He reports that his pretty painful.  He has not been able to sleep.  He did had some Percocet at home which she was prescribed for a back problem and he tried taking that as well as a muscle relaxer.  He reports that this did not help the pain at all.  He reports that his back problem is now doing much better.  He has also tried heat which was not helpful.  He has not taken ibuprofen as he has underlying coronary artery disease and prefers to avoid NSAIDs.  He reports that pain is primarily in the left groin area but it occasionally does radiate down into his left leg.  It is worse when he is walking on his feet.  It is mostly a dull ache but he does get some numbness and tingling.  He has not had weakness in his legs.  He does have history of osteoarthritis and states that he has had multiple cortisone injections in both of his knees but he has no prior history of hip issues.  Review of systems is otherwise negative.  Medications and allergies are reviewed and updated.    Current Outpatient Medications   Medication Sig Dispense Refill     aspirin 81 MG EC tablet Take 81 mg by mouth daily.       atorvastatin (LIPITOR) 80 MG tablet TAKE 1 TABLET BY MOUTH DAILY 90 tablet 0     cholecalciferol, vitamin D3, 1,000 unit tablet Take 1,000 Units by mouth daily.       cyclobenzaprine (FLEXERIL) 10 MG tablet Take 1 tablet (10 mg total) by mouth 3 (three) times a day as needed for muscle spasms. 10 tablet 0     lisinopril (PRINIVIL,ZESTRIL) 20 MG tablet Take 1 tablet (20 mg total) by mouth daily. 90 tablet 1     magnesium oxide 500 mg Tab Take 500 mg by mouth daily.       metoprolol succinate (TOPROL-XL) 50 MG 24 hr tablet Take 1 tablet (50 mg total) by mouth daily. 90 tablet 1     nitroglycerin (NITROSTAT) 0.4 MG SL tablet ONE TABLET UNDER TONGUE AS NEEDED FOR CHEST PAIN MAY REPEAT EVERY 5 MINUTES X 2. IF NO RELIEF DIAL 911 25 tablet 1     oxyCODONE-acetaminophen (PERCOCET/ENDOCET)  5-325 mg per tablet Take 1 tablet by mouth every 6 (six) hours as needed for pain. 12 tablet 0     vitamin E 400 unit capsule Take 400 Units by mouth daily.       gabapentin (NEURONTIN) 300 MG capsule Take 1 tablet by mouth at bedtime 30 capsule 0     HYDROcodone-acetaminophen 5-325 mg per tablet Take 1 tablet by mouth every 6 (six) hours as needed for pain. 10 tablet 0     No current facility-administered medications for this visit.        Past Medical History, Family History, and Social History reviewed.  Past Medical History:   Diagnosis Date     Coronary artery disease      High cholesterol      Hypertension      Myocardial infarction (H) 2005     Past Surgical History:   Procedure Laterality Date     CARDIAC CATHETERIZATION       CORONARY STENT PLACEMENT  2005     NV CATH PLACEMENT & NJX CORONARY ART ANGIO IMG S&I N/A 4/14/2017    Procedure: Coronary Angiogram;  Surgeon: Roverto Hollis MD;  Location: [facility] Cath Lab;  Service: Cardiology     NV CATH PLMT L HRT & ARTS W/NJX & ANGIO IMG S&I Left 4/14/2017    Procedure: Left Heart Catheterization Without Left Ventriculogram;  Surgeon: Roverto Hollis MD;  Location: [facility] Cath Lab;  Service: Cardiology     Patient has no known allergies.  Family History   Problem Relation Age of Onset     Cancer Mother      Coronary artery disease Father      Social History     Socioeconomic History     Marital status: Single     Spouse name: Not on file     Number of children: Not on file     Years of education: Not on file     Highest education level: Not on file   Occupational History     Not on file   Social Needs     Financial resource strain: Not on file     Food insecurity:     Worry: Not on file     Inability: Not on file     Transportation needs:     Medical: Not on file     Non-medical: Not on file   Tobacco Use     Smoking status: Never Smoker     Smokeless tobacco: Never Used   Substance and Sexual Activity     Alcohol use: Yes     Alcohol/week: 1.0 - 2.0  standard drinks     Types: 1 - 2 Cans of beer per week     Binge frequency: Weekly     Drug use: No     Sexual activity: Not on file   Lifestyle     Physical activity:     Days per week: Not on file     Minutes per session: Not on file     Stress: Not on file   Relationships     Social connections:     Talks on phone: Not on file     Gets together: Not on file     Attends Nondenominational service: Not on file     Active member of club or organization: Not on file     Attends meetings of clubs or organizations: Not on file     Relationship status: Not on file     Intimate partner violence:     Fear of current or ex partner: Not on file     Emotionally abused: Not on file     Physically abused: Not on file     Forced sexual activity: Not on file   Other Topics Concern     Not on file   Social History Narrative     Not on file         Review of systems is as stated in HPI, and the remainder of the 10 system review is otherwise negative.    Objective:     Vitals:    01/16/20 1413   BP: 140/72   Patient Site: Left Arm   Patient Position: Sitting   Cuff Size: Adult Large   Pulse: (!) 54   Temp: 97.4  F (36.3  C)   TempSrc: Oral   SpO2: 97%   Weight: 199 lb 4 oz (90.4 kg)    Body mass index is 27.79 kg/m .      General appearance: alert, appears stated age and cooperative  Musculoskeletal: No tenderness over lumbar spine or sacroiliac.  Negative straight leg testing bilaterally.  No tenderness over the left greater trochanter.  There is increased discomfort with internal range of motion testing in the left hip.  There is decreased range of motion in both hips.    Results: X-ray of left hip was performed.  This was personally reviewed.  Per my read it showed degenerative changes.      This note has been dictated using voice recognition software. Any grammatical or context distortions are unintentional and inherent to the the software.

## 2021-06-05 NOTE — PATIENT INSTRUCTIONS - HE
Try gabapentin for nerve pain.  This can be increased slowly up to three times daily    Take at bedtime for 3 days.  Then increase to AM and PM for 3 days  Than increase to three times daily        See ortho if pain is not improving    Try ice

## 2021-06-06 NOTE — TELEPHONE ENCOUNTER
Refill Approved    Rx renewed per Medication Renewal Policy. Medication was last renewed on 10/11/19.    Florence Nolan, [facility] Connection Triage/Med Refill 3/16/2020     Requested Prescriptions   Pending Prescriptions Disp Refills     atorvastatin (LIPITOR) 80 MG tablet [Pharmacy Med Name: ATORVASTATIN 80MG TABLETS] 90 tablet 0     Sig: TAKE 1 TABLET BY MOUTH DAILY       Statins Refill Protocol (Hmg CoA Reductase Inhibitors) Passed - 3/15/2020  5:52 AM        Passed - PCP or prescribing provider visit in past 12 months      Last office visit with prescriber/PCP: 3/9/2018 Valentino Lu MD OR same dept: 1/16/2020 Elsa Scott MD OR same specialty: 1/16/2020 Elsa Scott MD  Last physical: 10/24/2016 Last MTM visit: Visit date not found   Next visit within 3 mo: Visit date not found  Next physical within 3 mo: Visit date not found  Prescriber OR PCP: Valentino Lu MD  Last diagnosis associated with med order: 1. Hyperlipidemia  - atorvastatin (LIPITOR) 80 MG tablet [Pharmacy Med Name: ATORVASTATIN 80MG TABLETS]; TAKE 1 TABLET BY MOUTH DAILY  Dispense: 90 tablet; Refill: 0    If protocol passes may refill for 12 months if within 3 months of last provider visit (or a total of 15 months).

## 2021-06-07 NOTE — TELEPHONE ENCOUNTER
RN cannot approve Refill Request    RN can NOT refill this medication med is not covered by policy/route to provider. Last office visit: 9/19/2019 Lyn Barrios MD Last Physical: Visit date not found Last MTM visit: Visit date not found Last visit same specialty: 1/16/2020 Elsa Scott MD.  Next visit within 3 mo: Visit date not found  Next physical within 3 mo: Visit date not found      Karen Pozo, [facility] Triage/Med Refill 4/4/2020    Requested Prescriptions   Pending Prescriptions Disp Refills     cyclobenzaprine (FLEXERIL) 10 MG tablet [Pharmacy Med Name: CYCLOBENZAPRINE 10MG TABLETS] 10 tablet 0     Sig: TAKE 1 TABLET(10 MG) BY MOUTH THREE TIMES DAILY AS NEEDED FOR MUSCLE SPASMS       There is no refill protocol information for this order

## 2021-06-08 ENCOUNTER — COMMUNICATION - HEALTHEAST (OUTPATIENT)
Dept: FAMILY MEDICINE | Facility: CLINIC | Age: 61
End: 2021-06-08

## 2021-06-08 DIAGNOSIS — I25.10 CAD (CORONARY ARTERY DISEASE): ICD-10-CM

## 2021-06-08 DIAGNOSIS — I10 ESSENTIAL HYPERTENSION: ICD-10-CM

## 2021-06-08 NOTE — TELEPHONE ENCOUNTER
RN cannot approve Refill Request    RN can NOT refill this medication Protocol failed and NO refill given. Last office visit: 3/9/2018 Valentino Lu MD Last Physical: 10/24/2016 Last MTM visit: Visit date not found Last visit same specialty: 8/20/2018 Lamont France MD.  Next visit within 3 mo: Visit date not found  Next physical within 3 mo: Visit date not found      Lorena Radford, [facility] Triage/Med Refill 6/13/2020    Requested Prescriptions   Pending Prescriptions Disp Refills     lisinopriL (PRINIVIL,ZESTRIL) 20 MG tablet [Pharmacy Med Name: LISINOPRIL 20MG TABLETS] 90 tablet 1     Sig: TAKE 1 TABLET BY MOUTH EVERY DAY       There is no refill protocol information for this order        metoprolol succinate (TOPROL-XL) 50 MG 24 hr tablet [Pharmacy Med Name: METOPROLOL ER SUCCINATE 50MG TABS] 90 tablet 1     Sig: TAKE 1 TABLET BY MOUTH EVERY DAY       There is no refill protocol information for this order

## 2021-06-09 ENCOUNTER — RECORDS - HEALTHEAST (OUTPATIENT)
Dept: ADMINISTRATIVE | Facility: CLINIC | Age: 61
End: 2021-06-09

## 2021-06-09 RX ORDER — METOPROLOL SUCCINATE 50 MG/1
50 TABLET, EXTENDED RELEASE ORAL DAILY
Qty: 90 TABLET | Refills: 0 | Status: SHIPPED | OUTPATIENT
Start: 2021-06-09 | End: 2021-09-06

## 2021-06-09 RX ORDER — LISINOPRIL 20 MG/1
20 TABLET ORAL DAILY
Qty: 90 TABLET | Refills: 0 | Status: SHIPPED | OUTPATIENT
Start: 2021-06-09 | End: 2021-09-06

## 2021-06-09 NOTE — PROGRESS NOTES
Assessment/Plan:     1. Syncope  Discussed various options for workup and patient declines all testing at this time.  We discussed in office EKG, Holter monitor, echocardiogram, ultrasound of carotid and brain imaging.  He will consider if symptoms occur again.    2. Arteriosclerotic Cardiovascular Disease (ASCVD)  Patient up-to-date on labs blood pressure controlled we will get stress test as it has now been 5 years since last.  - NM Exercise Stress Test; Future    3. Snoring  States he previously discussed getting sleep apnea testing by PCP and may be willing to do so at this time will place referral.  - Ambulatory referral to Sleep Medicine        Subjective:      Minor Murcia is a 57 y.o. male comes in today with a few concerns.  Is my first time meeting with him.  Briefly reviewed past medical history and last visit note with primary care provider.  Also reviewed labs that were done October 2016 showing cholesterol is under good control and normal metabolic panel.  Patient had stress test last just over 5 years ago.  He states that he was having here primarily to discuss syncope.  This happened on March 3 he was with his wife and daughter-in-law at the [place].  He states it was not a particularly good movie and he was not laughing coughing or otherwise feeling ill.  He states he had had several drinks of scotch prior to this event but did not feel that he was drunk.  He states that he leaned over to tell his wife that he was not feeling well and then states that he laid his head slumped back in the chair.  States that he lasted for just about a minute.  His wife is a nurse and states that he thought he looked dead.  He did not have any convulsions but after he woke up he felt somewhat dizzy.  Paramedics were called and performed an evaluation states he had normal vitals sitting standing and laying he had a normal EKG and he also had normal glucose.  I do not have the results of these tests.  He states that he  felt a little dizzy that evening but by the next day he felt fine and worked a normal day he states he has a strenuous job at [employer].  He states otherwise he has been okay no recent illnesses fevers chest pain shortness of breath swelling in extremities.  He has not had to use his nitroglycerin.  He is taking his medications as prescribed.  He does have a history of coronary artery disease but has not seen cardiologist for several years.  He is somewhat concerned because he thought he should perhaps be seen them every year.  Has had a normal stress test reviewed the notes.  Reviewed last EKG but again has been some time.  He also states he snoring and he wakes up several times during the evening.  He does not wake up feeling rested states that primary care had discussed sleep apnea test which she is wanting to do but is somewhat concerned about cost.  No other new concerns    Current Outpatient Prescriptions   Medication Sig Dispense Refill     aspirin 81 MG EC tablet Take 81 mg by mouth daily.       atorvastatin (LIPITOR) 80 MG tablet TAKE 1 TABLET BY MOUTH DAILY 90 tablet 3     cholecalciferol, vitamin D3, 1,000 unit tablet Take 1,000 Units by mouth daily.       lisinopril (PRINIVIL,ZESTRIL) 5 MG tablet TAKE 1 TABLET BY MOUTH DAILY 90 tablet 3     nitroglycerin (NITROSTAT) 0.4 MG SL tablet ONE TABLET UNDER TONGUE AS NEEDED FOR CHEST PAIN MAY REPEAT EVERY 5 MINUTES X 2. IF NO RELIEF DIAL 911 25 tablet 1     No current facility-administered medications for this visit.        Past Medical History, Family History, and Social History reviewed.  No past medical history on file.  No past surgical history on file.  Review of patient's allergies indicates no known allergies.  No family history on file.  Social History     Social History     Marital status: Single     Spouse name: N/A     Number of children: N/A     Years of education: N/A     Occupational History     Not on file.     Social History Main Topics      Smoking status: Never Smoker     Smokeless tobacco: Not on file     Alcohol use Not on file     Drug use: Not on file     Sexual activity: Not on file     Other Topics Concern     Not on file     Social History Narrative         Review of systems is as stated in HPI, and the remainder of the 10 system review is otherwise negative.    Objective:     Vitals:    03/07/17 1255   BP: 140/80   Patient Site: Right Arm   Pulse: 68   Weight: 194 lb 6 oz (88.2 kg)    Body mass index is 27.11 kg/(m^2).    General Appearance:    Alert, cooperative, no distress, appears stated age   Head:    Normocephalic, without obvious abnormality, atraumatic   Eyes:    PERRL, EOM's intact   Ears:    Normal TM's and external ear canals   Nose:   Mucosa normal, no drainage     or sinus tenderness   Throat:   Oropharynx is clear   Neck:   Supple, symmetrical, no adenopathy, no thyromegally, no carotid bruit        Lungs:     Clear to auscultation bilaterally, respirations unlabored   Chest Wall:    No tenderness or deformity    Heart:    Regular rate and rhythm, S1 and S2 normal, no murmur, rub    or gallop                   Extremities:   Extremities normal, atraumatic, no cyanosis or edema   Pulses  Neuro:   2+ and symmetric all extremities  Cranial nerves grossly intact    Skin:   No rashes or lesions         This note has been dictated using voice recognition software. Any grammatical or context distortions are unintentional and inherent to the the software.

## 2021-06-09 NOTE — PROGRESS NOTES
[facility] Consultation Note    Thank you, Dr. Lu, for asking the [facility] team to see Minor Murcia in consultation today at our clinic to evaluate recent syncopal episode and abnormal nuclear stress test.      Assessment:   1.  Vasovagal syncope  2.  Abnormal nuclear stress test overall low risk stress test without angina likely false positive  3.  Coronary artery disease status post 2 vessel angioplasty and stenting 2005  3.  Essential hypertension not adequately controlled  4.  Hyperlipidemia under statin therapy     Plan:   Will obtain a coronary CT angiogram with calcium score in the near future to evaluate his recent abnormal stress Cardiolite test.  We'll increase his lisinopril from 5 mg at 20 mg a day for treatment of underlying hypertension            Current History:   57-year-old male who underwent 2 vessel angioplasty and stenting in June 2005 with a stent placed in his mid left anterior descending artery and distal right coronary artery.  He had a stress Cardiolite test in 2011 where he exercised over 12 minutes on a Se protocol with no anginal symptoms.  He did have some mild ST segment changes on EKG but no evidence of ischemia on Cardiolite imaging.  2 weeks ago while in the movie theater he suddenly became lightheaded and was very clammy and had a witnessed brief syncopal episode as witnessed by his wife.  He was unresponsive for around 1 minute or less.  He paramedics were summoned but he was alert and able to walk out of the movie theater into the ambulance.  Blood pressures both from supine to standing were normal at 130 mmHg.  He reports to a similar episode of syncope following a colonoscopy 3 years ago otherwise no other episodes of syncope.  He is active with no anginal symptoms      Patient underwent stress Cardiolite testing yesterday on March 15.  He again exercised for 12 minutes on a Se protocol.  He had no chest pain or EKG changes.  Cardiolite  "images did show a small distal anterolateral area of ischemia not seen on the study from  with ejection fraction of 59%.  This was felt to be a low risk test for  future cardiac event    Past Medical History:     Past Medical History:   Diagnosis Date     Coronary artery disease      High cholesterol      Myocardial infarction 2005    essential hypertension    Past Surgical History:     Past Surgical History:   Procedure Laterality Date     CARDIAC CATHETERIZATION       CORONARY STENT PLACEMENT      appendectomy during childhood    Family History:   No family history on file.  Father  of myocardial infarction age 72.  He has 3 brothers and 3 sisters none of whom have known coronary artery disease    Social History:    reports that he has never smoked. He does not have any smokeless tobacco history on file.  He is  and employed    Meds:   Scheduled Meds:  PRN Meds:.    Allergies:   Review of patient's allergies indicates no known allergies.    Review of Systems:   General: WNL  Eyes: WNL  Ears/Nose/Throat: WNL  Lungs: WNL, Snoring  Heart: Fainting  Stomach: WNL  Bladder: WNL  Muscle/Joints: Joint Pain  Skin: WNL  Nervous System: WNL        Blood: WNL      Objective:      Physical Exam  201 lb (91.2 kg)  5' 11\" (1.803 m)  Body mass index is 28.03 kg/(m^2).  Visit Vitals     /88 (Patient Site: Right Arm, Patient Position: Sitting, Cuff Size: Adult Large)     Pulse 88     Resp 18     Ht 5' 11\" (1.803 m)     Wt 201 lb (91.2 kg)     BMI 28.03 kg/m2       General Appearance:   alert, no apparent distress   HEENT:  no scleral icterus; the mucous membranes were pink and moist                                  Neck: jugular venous pressure is normal, no thyromegaly   Chest: the spine was straight and the chest was symmetric   Lungs:   respirations unlabored; the lungs are clear to auscultation   Cardiovascular:   regular rhythm with normal first and second heart sounds and no murmurs, clicks, or " gallops. Carotid, radial, femoral, and posterior tibial pulses are intact; there are no carotid or femoral bruits.   Abdomen:  no organomegaly, masses, bruits, or tenderness; bowel sounds are present   Extremities: no cyanosis, clubbing, or edema.  No obvious musculoskeletal abnormalities    Skin: no xanthelasma   Neurologic: mood and affect are appropriate           Cardiolite (Tc-99m Sestamibi) stress test from March 15, 2017 results reviewed as above          Lab Review   Lab Results   Component Value Date     10/24/2016    K 4.4 10/24/2016     10/24/2016    CO2 25 10/24/2016    BUN 13 10/24/2016    CREATININE 0.82 10/24/2016    CALCIUM 9.3 10/24/2016     Lab Results   Component Value Date    WBC 5.3 08/05/2015    WBC 5.4 05/16/2014    HGB 14.7 08/05/2015    HCT 42.7 08/05/2015    MCV 87 08/05/2015     08/05/2015     Lab Results   Component Value Date    CHOL 137 10/24/2016    TRIG 58 10/24/2016    HDL 34 (L) 10/24/2016         Lamont France M.D., F.A.C.C.  [facility]  [phone]

## 2021-06-10 NOTE — PROGRESS NOTES
Dear Dr. Chiara Duvall, Do  [address]  [address]  [place] [address]    Thank you for the opportunity to participate in the care of Mr. Minor Murcia.    He is a 57 y.o. male who comes to the clinic with a chief complaint of excessive daytime sleepiness for at least 10-15 years.  Minor has been complaining of frequent nocturnal awakening sometimes due to snoring.  His wife has informed him that he has significant pauses in his breathing during sleep followed by loud snoring.  His snoring is so loud that he and his wife no longer sleep in the same room.  To complicate matters further Minor is a cardiac patient and has had multiple stents placed.  His latest stent was placed last week.  His review of system was significant for syncope a few weeks back which prompted a cardiac workup and his latest stent placement.     Ideal Sleep-Wake Cycle(devoid of societal pressure):    Patient would try to initiate sleep at around 10-11 PM with a sleep latency of 5-15 minutes. The patient would have 2-4 nocturnal awakening. Final wake up time is around 5-9 AM.    Past Medical History  Past Medical History:   Diagnosis Date     Coronary artery disease      High cholesterol      Hypertension      Myocardial infarction 2005        Past Surgical History  Past Surgical History:   Procedure Laterality Date     CARDIAC CATHETERIZATION       CORONARY STENT PLACEMENT  2005     NC CATH PLACEMENT & NJX CORONARY ART ANGIO IMG S&I N/A 4/14/2017    Procedure: Coronary Angiogram;  Surgeon: Roverto Hollis MD;  Location: [facility] Lab;  Service: Cardiology     NC CATH PLMT L HRT & ARTS W/NJX & ANGIO IMG S&I Left 4/14/2017    Procedure: Left Heart Catheterization Without Left Ventriculogram;  Surgeon: Roverto Hollis MD;  Location: [facility] Lab;  Service: Cardiology        Meds  Current Outpatient Prescriptions   Medication Sig Dispense Refill     aspirin 81 MG EC tablet Take 81 mg by mouth daily.       atorvastatin (LIPITOR) 80  MG tablet TAKE 1 TABLET BY MOUTH DAILY 90 tablet 3     cholecalciferol, vitamin D3, 1,000 unit tablet Take 1,000 Units by mouth daily.       clopidogrel (PLAVIX) 75 mg tablet TAKE 1 TABLET BY MOUTH ONCE DAILY 90 tablet 3     lisinopril (PRINIVIL,ZESTRIL) 20 MG tablet Take 1 tablet (20 mg total) by mouth daily. 90 tablet 4     metoprolol succinate (TOPROL-XL) 50 MG 24 hr tablet TAKE 1 TABLET(50 MG) BY MOUTH DAILY 90 tablet 3     nitroglycerin (NITROSTAT) 0.4 MG SL tablet ONE TABLET UNDER TONGUE AS NEEDED FOR CHEST PAIN MAY REPEAT EVERY 5 MINUTES X 2. IF NO RELIEF DIAL 911 25 tablet 1     No current facility-administered medications for this visit.         Allergies  Review of patient's allergies indicates no known allergies.     Social History  Social History     Social History     Marital status: Single     Spouse name: N/A     Number of children: N/A     Years of education: N/A     Occupational History     Not on file.     Social History Main Topics     Smoking status: Never Smoker     Smokeless tobacco: Not on file     Alcohol use Not on file     Drug use: Not on file     Sexual activity: Not on file     Other Topics Concern     Not on file     Social History Narrative        Family History  Family History   Problem Relation Age of Onset     Cancer Mother      Coronary artery disease Father      Review of Systems:  Constitutional: Negative except as noted in HPI.   Eyes: Negative except as noted in HPI.   ENT: Negative except as noted in HPI.   Cardiovascular: Negative except as noted in HPI.   Respiratory: Negative except as noted in HPI.   Gastrointestinal: Negative except as noted in HPI.   Genitourinary: Negative except as noted in HPI.   Musculoskeletal: Negative except as noted in HPI.   Integumentary: Negative except as noted in HPI.   Neurological: Negative except as noted in HPI.   Psychiatric: Negative except as noted in HPI.   Endocrine: Negative except as noted in HPI.   Hematologic/Lymphatic: Negative  "except as noted in HPI.      STOP BANG 4/21/2017   Do you snore loudly (louder than talking or loud enough to be heard through closed doors)? 1   Do you often feel tired, fatigued, or sleepy during daytime? 1   Has anyone observed you stop breathing in your sleep? 1   Do you have or are you being treated for high blood pressure? 1   BMI more than 35 kg/m2 0   Age over 50 years old? 1   Neck circumference greater than 16 inches? 1   Gender male? 1   Total Score 7   Epworths Sleepiness Scale 4/21/2017   Sitting and reading 3   Watching TV 3   Sitting, inactive in a public place (e.g. a theatre or a meeting) 3   As a passenger in a car for an hour without a break 3   Lying down to rest in the afternoon when circumstances permit 3   Sitting and talking to someone 0   Sitting quietly after a lunch without alcohol 3   In a car, while stopped for a few minutes in traffic 1   Total score 19   Rooming 4/21/2017   Usual bedtime 10-11   Sleep Latency 5-15 mn   Awakenings 2-4   Wake Up Time 5-9   Energy Drinks 0   Coffee 2x week   Cola 0   Difficulty falling asleep No   Difficulty staying asleep Yes   Excessive daytime tiredness Yes   Excessive daytime sleepiness Yes   Dozing off while driving Yes   Shift Worker Yes   Sleep Walking? No   Sleep Talking? No   Kicking or punching? No   Restless legs symptoms No       Physical Exam:  /82  Pulse (!) 57  Ht 5' 11\" (1.803 m)  Wt 198 lb 1.6 oz (89.9 kg)  SpO2 96%  BMI 27.63 kg/m2  BMI:Body mass index is 27.63 kg/(m^2).   GEN: NAD  Head: Normocephalic.  EYES: PERRLA, EOMI  ENT: Oropharynx is clear, mallampatti class 4+ airway.   Nasal mucosa is moist without erythema  Neck : Thyroid is within normal limits. neck circ 17  CV: Regular rate and rhythm, S1 & S2 positive.  LUNGS: Bilateral breathsounds heard.   ABDOMEN: Positive bowel sounds in all quadrants, soft, no rebound or guarding  MUSCULOSKELETAL: Trace leg swelling  SKIN: warm, dry, no rashes  Neurological: Alert, " oriented to time, place, and person.  Psych: normal mood, normal affect        Labs/Studies:     Lab Results   Component Value Date    WBC 5.2 04/14/2017    HGB 14.1 04/14/2017    HCT 41.1 04/14/2017    MCV 86 04/14/2017     04/14/2017         Chemistry        Component Value Date/Time     04/14/2017 0818    K 4.0 04/14/2017 0818     (H) 04/14/2017 0818    CO2 25 04/14/2017 0818    BUN 29 (H) 04/14/2017 0818    CREATININE 1.03 04/14/2017 0818    GLU 98 04/14/2017 0818        Component Value Date/Time    CALCIUM 9.3 04/14/2017 0818    ALKPHOS 81 10/24/2016 1145    AST 23 10/24/2016 1145    ALT 18 10/24/2016 1145    BILITOT 1.6 (H) 10/24/2016 1145            No results found for: FERRITIN  Lab Results   Component Value Date    TSH 1.1 05/20/2011         Assessment and Plan:  In summary Minor Murcia is a 57 y.o. year old male here for review of his sleep study.  1.  Suspected Sleep Apnea   Mr. Minor Murcia has high risk for obstructive sleep apnea based on the history of witnessed apnea, snoring and a crowded airway. I educated the patient on the underlying pathophysiology of obstructive sleep apnea using educational slides. We reviewed the risks associated with sleep apnea, including increased cardiovascular risk and overall death. We talked about treatment options in general. I recommend getting  an split-night nocturnal polysomnography. The patient should return to the clinic to discuss results and treatment option in a patient-centered approach.  2.  Hypersomnia  3.  Morning  4.  Other sleep disturbance    Patient verbalized understanding of these issues, agrees with the plan and all questions were answered today. Patient was given an opportuntity to voice any other symptoms or concerns not listed above. Patient did not have any other symptoms or concerns.      Patient told to return in one week after the sleep study is interpreted. Patient instructed to stop at  to schedule appointment  before leaving today.       Crescencio Cornell DO  Board Certified in Internal Medicine and Sleep Medicine  [facility].    (Note created with Dragon voice recognition and unintended spelling errors and word substitutions may occur)

## 2021-06-10 NOTE — PROGRESS NOTES
Assessment/Plan:     1. Ingrown nail  Significant improvement as patient has been soaking his nail.  No indication for removing at this point.  Recommended continued soaks may use Neosporin or Vaseline in the coronary try to let nail grow out past the tip.  If the redness swells back up patient was given printed out prescription for antibiotic which he can fill start taking and then call us would recommend he follow with podiatry.  - cephalexin (KEFLEX) 500 MG capsule; Take 1 capsule (500 mg total) by mouth 3 (three) times a day for 10 days.  Dispense: 30 capsule; Refill: 0    2. Onychomycosis  Discussed possible treatments risks and benefits.  Last hepatic panel was normal okay to go ahead and start terbinafine plan to recheck labs 1-2 months.  - terbinafine HCl (LAMISIL) 250 mg tablet; Take 1 tablet (250 mg total) by mouth daily.  Dispense: 90 tablet; Refill: 1  - Hepatic Profile; Future    3. Coronary artery disease involving native coronary artery of native heart, angina presence unspecified  Reviewed recent stent and workup with cardiology.  He feels well.  Will continue to follow with cardiology and primary care provider.      Subjective:      Minor Murcia is a 57 y.o. male comes in today primarily for concerns with his toe.  He states that he thinks he may have an ingrown toenail.  He states it has been bothering him for about a week.  He states at the site of his toenail swelled up a bit red he thinks there was some drainage.  He has been soaking it in Epsom salts and has noted significant improvement.  It is no longer significantly painful.  States he has been wearing loose shoes.  He also has some concern about his toenails.  States that for a long time they have been thickened yellow and brittle thinks that has a fungus infection and wants to know what are some options for treatment.  Did review his past visit notes.  Had liver function last done about 6 months ago it always been normal.  He is on a  "statin but has not had no further significant use.  Of note he did recently have some workup for his liver including an MRI which turned out to be a hemangioma.  He tells me that I \"saved his life\" because I had sent him for a stress test and he was found to have a new blockage went through some workup and ended up having a stent with cardiology.  He is taking his medications as prescribed.  He is very thankful to be feeling well.  No other new concerns today.    Current Outpatient Prescriptions   Medication Sig Dispense Refill     aspirin 81 MG EC tablet Take 81 mg by mouth daily.       atorvastatin (LIPITOR) 80 MG tablet TAKE 1 TABLET BY MOUTH DAILY 90 tablet 3     cholecalciferol, vitamin D3, 1,000 unit tablet Take 1,000 Units by mouth daily.       clopidogrel (PLAVIX) 75 mg tablet TAKE 1 TABLET BY MOUTH ONCE DAILY 90 tablet 3     lisinopril (PRINIVIL,ZESTRIL) 20 MG tablet Take 1 tablet (20 mg total) by mouth daily. 90 tablet 4     metoprolol succinate (TOPROL-XL) 50 MG 24 hr tablet TAKE 1 TABLET(50 MG) BY MOUTH DAILY 90 tablet 3     nitroglycerin (NITROSTAT) 0.4 MG SL tablet ONE TABLET UNDER TONGUE AS NEEDED FOR CHEST PAIN MAY REPEAT EVERY 5 MINUTES X 2. IF NO RELIEF DIAL 911 25 tablet 1     cephalexin (KEFLEX) 500 MG capsule Take 1 capsule (500 mg total) by mouth 3 (three) times a day for 10 days. 30 capsule 0     terbinafine HCl (LAMISIL) 250 mg tablet Take 1 tablet (250 mg total) by mouth daily. 90 tablet 1     No current facility-administered medications for this visit.        Past Medical History, Family History, and Social History reviewed.  Past Medical History:   Diagnosis Date     Coronary artery disease      High cholesterol      Hypertension      Myocardial infarction 2005     Past Surgical History:   Procedure Laterality Date     CARDIAC CATHETERIZATION       CORONARY STENT PLACEMENT  2005     [place] CATH PLACEMENT & NJX CORONARY ART ANGIO [facility] [place] S&I N/A 4/14/2017    Procedure: Coronary Angiogram;  Surgeon: " "Roverto Hollis MD;  Location: [facility] Cath Lab;  Service: Cardiology     [place] CATH PLMT L HRT & ARTS W/NJX & ANGIO IMG S&I Left 4/14/2017    Procedure: Left Heart Catheterization Without Left Ventriculogram;  Surgeon: Roverto Hollis MD;  Location: [facility] Cath Lab;  Service: Cardiology     Review of patient's allergies indicates no known allergies.  Family History   Problem Relation Age of Onset     Cancer Mother      Coronary artery disease Father      Social History     Social History     Marital status: Single     Spouse name: N/A     Number of children: N/A     Years of education: N/A     Occupational History     Not on file.     Social History Main Topics     Smoking status: Never Smoker     Smokeless tobacco: Not on file     Alcohol use Yes     Drug use: Not on file     Sexual activity: Not on file     Other Topics Concern     Not on file     Social History Narrative         Review of systems is as stated in HPI, and the remainder of the 10 system review is otherwise negative.    Objective:     Vitals:    05/23/17 1504   BP: 134/84   Pulse: (!) 54   SpO2: 97%   Weight: 200 lb (90.7 kg)   Height: 5' 11\" (1.803 m)    Body mass index is 27.89 kg/(m^2).    General Appearance:    Alert, cooperative, no distress, appears stated age   Head:    Normocephalic, without obvious abnormality, atraumatic   Eyes:    PERRL, EOM's intact   Ears:    Normal external ear canals   Nose:   Mucosa normal, no drainage        Throat:   Oropharynx is clear   Neck:   Supple, symmetrical, no adenopathy, no thyromegally       Lungs:     Clear to auscultation bilaterally, respirations unlabored   Chest Wall:    No tenderness or deformity    Heart:    Regular rate and rhythm, S1 and S2 normal, no murmur, rub    or gallop                   Extremities:   Extremities normal, atraumatic, no cyanosis or edema       Skin:  toenails are thick brittle and yellow.  There is no significant deformity or significant amount of ingrown toenail " in the area in question but there is some mild surrounding redness to the right side of the toe.           This note has been dictated using voice recognition software. Any grammatical or context distortions are unintentional and inherent to the the software.

## 2021-06-10 NOTE — PROGRESS NOTES
" Patient ID: Minor Murcia is a 57 y.o. male.  /72  Pulse (!) 59  Ht 5' 11\" (1.803 m)  Wt 198 lb (89.8 kg)  SpO2 97%  BMI 27.62 kg/m2    Assessment/Plan:                   Diagnoses and all orders for this visit:    Liver lesion  -     MR Liver With Contrast; Future; Expected date: 4/21/17    CAD (coronary artery disease)      DISCUSSION  Continue current medication management.  Gradually increase activities.  Follow-up with cardiology as they recommend.  Discussed importance of remaining on Plavix.  Follow-up scan to further characterize the liver lesion seen incidentally on the CT scans described in more detail below  Subjective:     HPI    Minor Murcia is a 57-year-old man who has a history of coronary artery disease.  Recently had presented with a syncopal episode and was sent for a repeat stress test.  Stress test showed an abnormality which led to a coronary CT angiogram.  This showed an obstructive lesion which subsequently led to angiography with stent placement.  Patient is here today for follow-up.  He is now on Plavix.  Patient understands the importance of taking all medications.  Blood pressure is now well controlled.  We reviewed previous cholesterol numbers noting good control with his high dose statin therapy.  Patient reports no side effects of medications.  His vascular access site in the right wrist does show some mild bruising which is in a state of resolution.  There is no evidence of hematoma.  Good range of motion in the wrist is noted.  Sensation distally is noted to be intact.  Incidentally noted on the CT scan was a liver lesion.  This had previously been noticed in 2011.  It does not appear that there was specific follow-up for this liver lesion in 2011 done through the emergency department.  The lesion does appear from the description to be relatively stable and not likely a significant malignant type process.  We discussed obtaining additional imaging as requested to further " "characterize the lesion which is likely to be a hemangioma although could not be determined from the most recent or previous imaging studies.  He does not report any abdominal pain symptoms.  Overall feels well.    Review of Systems  Complete review of systems is obtained.  Other than the specific considerations noted above complete review of systems is negative.          Objective:   Medications:  Current Outpatient Prescriptions   Medication Sig     aspirin 81 MG EC tablet Take 81 mg by mouth daily.     atorvastatin (LIPITOR) 80 MG tablet TAKE 1 TABLET BY MOUTH DAILY     cholecalciferol, vitamin D3, 1,000 unit tablet Take 1,000 Units by mouth daily.     clopidogrel (PLAVIX) 75 mg tablet TAKE 1 TABLET BY MOUTH ONCE DAILY     lisinopril (PRINIVIL,ZESTRIL) 20 MG tablet Take 1 tablet (20 mg total) by mouth daily.     metoprolol succinate (TOPROL-XL) 50 MG 24 hr tablet TAKE 1 TABLET(50 MG) BY MOUTH DAILY     nitroglycerin (NITROSTAT) 0.4 MG SL tablet ONE TABLET UNDER TONGUE AS NEEDED FOR CHEST PAIN MAY REPEAT EVERY 5 MINUTES X 2. IF NO RELIEF DIAL 911     Allergies:  No Known Allergies    Tobacco:   reports that he has never smoked. He does not have any smokeless tobacco history on file.     Physical Exam      /72  Pulse (!) 59  Ht 5' 11\" (1.803 m)  Wt 198 lb (89.8 kg)  SpO2 97%  BMI 27.62 kg/m2      General Appearance:    Alert, cooperative, no distress   Eyes:    No conjunctival irritation, no scleral icterus       Lungs:     Clear to auscultation bilaterally, respirations unlabored   Heart:    Regular rate and rhythm, S1 and S2 normal, no murmur, rub   or gallop   Extremities:   Extremities normal, atraumatic, no cyanosis or edema, mild ecchymosis seen near the puncture site in the right wrist.  Good range of motion the wrist and no other concerns currently.     Skin:   Skin color, texture, turgor normal, no rashes or lesions   Neurologic:   Normal strength and sensation                        "

## 2021-06-11 NOTE — PROGRESS NOTES
Order for Durable Medical Equipment was processed and equipment ordered.   DME provider: Ashley  Date Faxed: 06/26/2017  Ordering Provider: Dr. Cornell  Equipment ordered: Cpap

## 2021-06-12 NOTE — PROGRESS NOTES
[facility] Progress Note    Assessment:    1.  Coronary artery disease with no recurrent anginal symptoms after recent stenting  2.  Essential hypertension controlled  3.  Hyperlipidemia under statin therapy    Plan:    Would continue the patient's Plavix for 1 year of therapy.  I have made no other changes in his current medical regimen.  Would like to see Minor in follow-up in 1 year    An After Visit Summary was printed and given to the patient.    Subjective:    37-year-old male who had two-vessel angioplasty and stenting in 2005 with stents placed in his mid left anterior descending artery and distal right coronary artery.  Patient had an abnormal nuclear stress test in March of this year.  Repeat coronary angiography performed on April 14, 2017 revealed a severe proximal LAD lesion that was successfully stented.  He had mild disease in the circumflex with a patent stent in his distal right coronary artery.  Over the last several months he has had no recurrent chest pain or other cardiac symptoms    Problem List:    Patient Active Problem List   Diagnosis     Hyperlipidemia     Impaired Fasting Glucose     Arteriosclerotic Cardiovascular Disease (ASCVD)     CAD (coronary artery disease)     Ischemic chest pain     Abnormal stress test     Coronary artery disease involving native coronary artery of native heart, angina presence unspecified         Current Outpatient Prescriptions   Medication Sig Dispense Refill     aspirin 81 MG EC tablet Take 81 mg by mouth daily.       atorvastatin (LIPITOR) 80 MG tablet TAKE 1 TABLET BY MOUTH DAILY 90 tablet 3     cholecalciferol, vitamin D3, 1,000 unit tablet Take 1,000 Units by mouth daily.       clopidogrel (PLAVIX) 75 mg tablet TAKE 1 TABLET BY MOUTH ONCE DAILY 90 tablet 3     lisinopril (PRINIVIL,ZESTRIL) 20 MG tablet Take 1 tablet (20 mg total) by mouth daily. 90 tablet 4     metoprolol succinate (TOPROL-XL) 50 MG 24 hr tablet TAKE 1 TABLET(50 MG) BY  "MOUTH DAILY 90 tablet 3     nitroglycerin (NITROSTAT) 0.4 MG SL tablet ONE TABLET UNDER TONGUE AS NEEDED FOR CHEST PAIN MAY REPEAT EVERY 5 MINUTES X 2. IF NO RELIEF DIAL 911 25 tablet 1     terbinafine HCl (LAMISIL) 250 mg tablet Take 250 mg by mouth daily.       No current facility-administered medications for this visit.        .  Past Surgical History:   Procedure Laterality Date     CARDIAC CATHETERIZATION       CORONARY STENT PLACEMENT  2005     [place] CATH PLACEMENT & NJX CORONARY ART ANGIO IMG S&I N/A 4/14/2017    Procedure: Coronary Angiogram;  Surgeon: Roverto Hollis MD;  Location: [facility] Cath Lab;  Service: Cardiology     [place] CATH PLMT L HRT & ARTS W/NJX & ANGIO IMG S&I Left 4/14/2017    Procedure: Left Heart Catheterization Without Left Ventriculogram;  Surgeon: Roverto Hollis MD;  Location: [facility] Cath Lab;  Service: Cardiology       .  Social History     Social History     Marital status: Single     Spouse name: N/A     Number of children: N/A     Years of education: N/A     Occupational History     Not on file.     Social History Main Topics     Smoking status: Never Smoker     Smokeless tobacco: Not on file     Alcohol use Yes     Drug use: Not on file     Sexual activity: Not on file     Other Topics Concern     Not on file     Social History Narrative       .  Family History   Problem Relation Age of Onset     Cancer Mother      Coronary artery disease Father      Review of Systems:  General: WNL  Eyes: WNL  Ears/Nose/Throat: WNL  Lungs: WNL  Heart: WNL  Stomach: WNL  Bladder: WNL  Muscle/Joints: WNL  Skin: WNL  Nervous System: WNL  Mental Health: WNL     Blood: WNL    Objective:     /70 (Patient Site: Right Arm, Patient Position: Sitting, Cuff Size: Adult Large)  Pulse 60  Resp 18  Ht 5' 11\" (1.803 m)  Wt 201 lb (91.2 kg)  BMI 28.03 kg/m2  201 lb (91.2 kg)   [unfilled]    Physical Exam:    GENERAL APPEARANCE: alert, no apparent distress  HEENT: no scleral icterus or " xanthelasma  NECK: jugular venous pressure normal  CHEST: symmetric, the lungs were clear to auscultation  CARDIOVASCULAR: regular rhythm without murmur, click, or gallop; no carotid bruits  ABDOMEN: nondistended, nontender, bowel sounds present  EXTREMITIES: no cyanosis, clubbing or edema.    Cardiac Testing:    Cardiolite (Tc-99m Sestamibi) stress test from March 15, 2017 results reviewed      Lab Results:    LIPIDS:  Lab Results   Component Value Date    CHOL 152 04/14/2017    CHOL 137 10/24/2016    CHOL 188 08/05/2015     Lab Results   Component Value Date    HDL 39 (L) 04/14/2017    HDL 34 (L) 10/24/2016    HDL 38 (L) 08/05/2015     Lab Results   Component Value Date    LDLCALC 98 04/14/2017    LDLCALC 91 10/24/2016    LDLCALC 133 (H) 08/05/2015     Lab Results   Component Value Date    TRIG 74 04/14/2017    TRIG 58 10/24/2016    TRIG 84 08/05/2015     No components found for: CHOLHDL    BMP:  Lab Results   Component Value Date    CREATININE 1.03 04/14/2017    BUN 29 (H) 04/14/2017     04/14/2017    K 4.0 04/14/2017     (H) 04/14/2017    CO2 25 04/14/2017         Lamont France MD,F.A.C.C.  [facility]  [phone]

## 2021-06-12 NOTE — PROGRESS NOTES
Dear Dr. Valentino Lu MD  [address]  [place] [address],    Thank you for the opportunity to participate in the care of Minor Murcia.     He is a 57 y.o.  male patient who comes to the sleep medicine clinic for review of his sleep studies. The home sleep study was completed on 06/01/17 which showed the the patient had severe obstructive sleep apnea with an apnea hypopnea index of 47.8 events per hour with the lowest O2 sat of 80%.  The patient also appeared to have central sleep apnea.  On 06/21/17 I invited the patient to return for an in lab titration study which showed that adaptive servo ventilation was the optimal machine to treat his sleep disordered breathing.  He has been on this machine for at least 2 weeks.  He states that his sleep quality and energy level has improved since starting pressure therapy.  Furthermore he also states that he is dreaming much more compared to before.  The only complaint that he has is that sometimes the machine wake him up making it difficult for him to reinitiate sleep.    Compliance Download data for 30 days:  Pressure setting: Adaptive servo ventilation  Residual AHI: 8.5 events per hour  Leak: Minimal  Compliance: 60%  Mask Tolerance: Good  Skin irritation: None      Past Medical History:   Diagnosis Date     Coronary artery disease      High cholesterol      Hypertension      Myocardial infarction 2005       Past Surgical History:   Procedure Laterality Date     CARDIAC CATHETERIZATION       CORONARY STENT PLACEMENT  2005     [place] CATH PLACEMENT & NJX CORONARY ART ANGIO IMG S&I N/A 4/14/2017    Procedure: Coronary Angiogram;  Surgeon: Roverto Hollis MD;  Location: [facility] Cath Lab;  Service: Cardiology     [place] CATH PLMT L HRT & ARTS W/NJX & ANGIO IMG S&I Left 4/14/2017    Procedure: Left Heart Catheterization Without Left Ventriculogram;  Surgeon: Roverto Hollis MD;  Location: [facility] Cath Lab;  Service: Cardiology       Social History     Social  "History     Marital status: Single     Spouse name: N/A     Number of children: N/A     Years of education: N/A     Occupational History     Not on file.     Social History Main Topics     Smoking status: Never Smoker     Smokeless tobacco: Not on file     Alcohol use Yes     Drug use: Not on file     Sexual activity: Not on file     Other Topics Concern     Not on file     Social History Narrative       Current Outpatient Prescriptions   Medication Sig Dispense Refill     aspirin 81 MG EC tablet Take 81 mg by mouth daily.       atorvastatin (LIPITOR) 80 MG tablet TAKE 1 TABLET BY MOUTH DAILY 90 tablet 3     cholecalciferol, vitamin D3, 1,000 unit tablet Take 1,000 Units by mouth daily.       clopidogrel (PLAVIX) 75 mg tablet TAKE 1 TABLET BY MOUTH ONCE DAILY 90 tablet 3     lisinopril (PRINIVIL,ZESTRIL) 20 MG tablet Take 1 tablet (20 mg total) by mouth daily. 90 tablet 4     metoprolol succinate (TOPROL-XL) 50 MG 24 hr tablet TAKE 1 TABLET(50 MG) BY MOUTH DAILY 90 tablet 3     nitroglycerin (NITROSTAT) 0.4 MG SL tablet ONE TABLET UNDER TONGUE AS NEEDED FOR CHEST PAIN MAY REPEAT EVERY 5 MINUTES X 2. IF NO RELIEF DIAL 911 25 tablet 1     terbinafine HCl (LAMISIL) 250 mg tablet Take 250 mg by mouth daily.       No current facility-administered medications for this visit.        No Known Allergies    Physical Exam:  /78  Pulse (!) 55  Ht 5' 11\" (1.803 m)  Wt 198 lb 3.2 oz (89.9 kg)  SpO2 98%  BMI 27.64 kg/m2  BMI:Body mass index is 27.64 kg/(m^2).   GEN: NAD,   Head: Normocephalic.  Psych: normal mood, normal affect     Labs/Studies:  - We reviewed the results of the overnight PSG as described on the HPI.     Lab Results   Component Value Date    WBC 5.2 04/14/2017    HGB 14.1 04/14/2017    HCT 41.1 04/14/2017    MCV 86 04/14/2017     04/14/2017         Chemistry        Component Value Date/Time     04/14/2017 0818    K 4.0 04/14/2017 0818     (H) 04/14/2017 0818    CO2 25 04/14/2017 0818 "    BUN 29 (H) 04/14/2017 0818    CREATININE 1.03 04/14/2017 0818    GLU 98 04/14/2017 0818        Component Value Date/Time    CALCIUM 9.3 04/14/2017 0818    ALKPHOS 81 10/24/2016 1145    AST 23 10/24/2016 1145    ALT 18 10/24/2016 1145    BILITOT 1.6 (H) 10/24/2016 1145            No results found for: FERRITIN        Assessment and Plan:  In summary Minor Murcia is a 57 y.o. year old male here for review of sleep study and compliance download data.  1. Obstructive Sleep Apnea/Central Sleep Apnea/[place] Respiration  I encouraged the patient try to use the machine as much as he can.  In fact I encouraged him to use his machine with naps on his days off.  The patient is interested in switching over his [employer] to [employer].  I will defer this to a specialist.  I would like to also narrow the patient's pressure support range to 3-8 CWP and keep the rest of the parameters of the adaptive servo ventilation until next visit.  I also strongly recommended the patient bring his ASV and accessory equipment with him on the next visit so we can make adjustments in the clinic.  2.  Hypersomnia  Improved  3.  Other sleep disturbance       Patient verbalized understanding of these issues, agrees with the plan and all questions were answered today. Patient was given an opportuntity to voice any other symptoms or concerns not listed above. Patient did not have any other symptoms or concerns.      Patient told to return in 2 weeks. Patient instructed to stop at  to schedule appointment before leaving today.    Crescencio Cornell DO  Board Certified in Internal Medicine and Sleep Medicine  [facility].    We spent a total of 25 minutes of face-to-face encounter and more than 50% of the encounter was used for counseling or coordination of care.    (Note created with Dragon voice recognition and unintended spelling errors and word substitutions may occur)

## 2021-06-12 NOTE — PROGRESS NOTES
Dear Dr. Valentino Lu MD  [address]  [address]  [place] [address],    Thank you for the opportunity to participate in the care of Minor Murcia.     He is a 57 y.o. y/o male patient who comes to the sleep medicine clinic for follow up.  I reviewed the patient's compliance download.  He states that the adjustment that I made on is a history during the last visit was a positive one.  This past week he has been able to sleep more soundly with the ASV compared to before.  He has noticed a positive improvement in his overall health.    Compliance Download data for 30 days:  Pressure setting: ASV  Residual AHI: 8.2 events per hour  Leak: Minimal  Compliance: 63%  Mask Tolerance: Good  Skin irritation: None      Past Medical History:   Diagnosis Date     Coronary artery disease      High cholesterol      Hypertension      Myocardial infarction 2005       Past Surgical History:   Procedure Laterality Date     CARDIAC CATHETERIZATION       CORONARY STENT PLACEMENT  2005     [place] CATH PLACEMENT & NJX CORONARY ART ANGIO IMG S&I N/A 4/14/2017    Procedure: Coronary Angiogram;  Surgeon: Roverto Hollis MD;  Location: [facility] Cath Lab;  Service: Cardiology     [place] CATH PLMT L HRT & ARTS W/NJX & ANGIO IMG S&I Left 4/14/2017    Procedure: Left Heart Catheterization Without Left Ventriculogram;  Surgeon: Roverto Hollis MD;  Location: [facility] Cath Lab;  Service: Cardiology       Social History     Social History     Marital status: Single     Spouse name: N/A     Number of children: N/A     Years of education: N/A     Occupational History     Not on file.     Social History Main Topics     Smoking status: Never Smoker     Smokeless tobacco: Not on file     Alcohol use Yes     Drug use: Not on file     Sexual activity: Not on file     Other Topics Concern     Not on file     Social History Narrative         Current Outpatient Prescriptions   Medication Sig Dispense Refill     aspirin 81 MG EC tablet Take 81 mg by mouth  "daily.       atorvastatin (LIPITOR) 80 MG tablet TAKE 1 TABLET BY MOUTH DAILY 90 tablet 3     cholecalciferol, vitamin D3, 1,000 unit tablet Take 1,000 Units by mouth daily.       clopidogrel (PLAVIX) 75 mg tablet TAKE 1 TABLET BY MOUTH ONCE DAILY 90 tablet 3     lisinopril (PRINIVIL,ZESTRIL) 20 MG tablet Take 1 tablet (20 mg total) by mouth daily. 90 tablet 4     metoprolol succinate (TOPROL-XL) 50 MG 24 hr tablet TAKE 1 TABLET(50 MG) BY MOUTH DAILY 90 tablet 3     nitroglycerin (NITROSTAT) 0.4 MG SL tablet ONE TABLET UNDER TONGUE AS NEEDED FOR CHEST PAIN MAY REPEAT EVERY 5 MINUTES X 2. IF NO RELIEF DIAL 911 25 tablet 1     terbinafine HCl (LAMISIL) 250 mg tablet Take 250 mg by mouth daily.       No current facility-administered medications for this visit.        No Known Allergies    Physical Exam:  /72  Pulse (!) 55  Ht 5' 11\" (1.803 m)  Wt 202 lb (91.6 kg)  SpO2 99%  BMI 28.17 kg/m2  BMI:Body mass index is 28.17 kg/(m^2).   GEN: NAD,   Psych: normal mood, normal affect    Labs/Studies:     I reviewed the efficacy and compliance report from his device. Data summarized on the HPI and the compliance flow sheet.     Lab Results   Component Value Date    WBC 5.2 04/14/2017    HGB 14.1 04/14/2017    HCT 41.1 04/14/2017    MCV 86 04/14/2017     04/14/2017         Chemistry        Component Value Date/Time     04/14/2017 0818    K 4.0 04/14/2017 0818     (H) 04/14/2017 0818    CO2 25 04/14/2017 0818    BUN 29 (H) 04/14/2017 0818    CREATININE 1.03 04/14/2017 0818    GLU 98 04/14/2017 0818        Component Value Date/Time    CALCIUM 9.3 04/14/2017 0818    ALKPHOS 81 10/24/2016 1145    AST 23 10/24/2016 1145    ALT 18 10/24/2016 1145    BILITOT 1.6 (H) 10/24/2016 1145            No results found for: FERRITIN         Assessment and Plan:  In summary Minor Murcia is a 57 y.o. year old male who is here for compliance download review.    1.  Obstructive Sleep Apnea/Central Sleep Apnea  Continue " with ASV.  I advised the patient to try to increase his hours of usage.  2.  Hypersomnia  Improved  3.  Other sleep disturbance     Patient verbalized understanding of these issues, agrees with the plan and all questions were answered today. Patient was given an opportuntity to voice any other symptoms or concerns not listed above. Patient did not have any other symptoms or concerns.      Patient told to return in 6 weeks. Patient instructed to stop at  to schedule appointment before leaving today.    Crescencio Cornell DO  Board Certified in Internal Medicine and Sleep Medicine  [facility].    I spent a total of 15 minutes of face-to-face encounter and more than 50% of the encounter was used for counseling or coordination of care.    (Note created with Dragon voice recognition and unintended spelling errors and word substitutions may occur)

## 2021-06-13 NOTE — PROGRESS NOTES
Dear Dr. Valentino Lu MD  [address]  [address]  [place] [address],    Thank you for the opportunity to participate in the care of Minor Murcia.     He is a 57 y.o. y/o male patient who comes to the sleep medicine clinic for follow up.  Since the patient's last clinical visit with me he states that his sleep quality and energy level has dramatically improved using adaptive servo ventilation.  He now rates his overall experiences a positive one.    Compliance Download data for 30 days:  Pressure setting:ASV  Residual AHI: 5.5 events per hour  Leak: Minimal  Compliance: 90%  Mask Tolerance: Good  Skin irritation: None      Past Medical History:   Diagnosis Date     Coronary artery disease      High cholesterol      Hypertension      Myocardial infarction 2005       Past Surgical History:   Procedure Laterality Date     CARDIAC CATHETERIZATION       CORONARY STENT PLACEMENT  2005     [place] CATH PLACEMENT & NJX CORONARY ART ANGIO IMG S&I N/A 4/14/2017    Procedure: Coronary Angiogram;  Surgeon: Roverto Hollis MD;  Location: [facility] Cath Lab;  Service: Cardiology     [place] CATH PLMT L HRT & ARTS W/NJX & ANGIO IMG S&I Left 4/14/2017    Procedure: Left Heart Catheterization Without Left Ventriculogram;  Surgeon: Roverto Hollis MD;  Location: [facility] Cath Lab;  Service: Cardiology       Social History     Social History     Marital status: Single     Spouse name: N/A     Number of children: N/A     Years of education: N/A     Occupational History     Not on file.     Social History Main Topics     Smoking status: Never Smoker     Smokeless tobacco: Not on file     Alcohol use Yes     Drug use: Not on file     Sexual activity: Not on file     Other Topics Concern     Not on file     Social History Narrative         Current Outpatient Prescriptions   Medication Sig Dispense Refill     aspirin 81 MG EC tablet Take 81 mg by mouth daily.       atorvastatin (LIPITOR) 80 MG tablet TAKE 1 TABLET BY MOUTH DAILY 90 tablet  "2     cholecalciferol, vitamin D3, 1,000 unit tablet Take 1,000 Units by mouth daily.       clopidogrel (PLAVIX) 75 mg tablet TAKE 1 TABLET BY MOUTH ONCE DAILY 90 tablet 3     lisinopril (PRINIVIL,ZESTRIL) 20 MG tablet Take 1 tablet (20 mg total) by mouth daily. 90 tablet 4     metoprolol succinate (TOPROL-XL) 50 MG 24 hr tablet TAKE 1 TABLET(50 MG) BY MOUTH DAILY 90 tablet 3     nitroglycerin (NITROSTAT) 0.4 MG SL tablet ONE TABLET UNDER TONGUE AS NEEDED FOR CHEST PAIN MAY REPEAT EVERY 5 MINUTES X 2. IF NO RELIEF DIAL 911 25 tablet 1     terbinafine HCl (LAMISIL) 250 mg tablet Take 250 mg by mouth daily.       No current facility-administered medications for this visit.        No Known Allergies    Physical Exam:  /80  Pulse (!) 58  Ht 5' 11\" (1.803 m)  Wt 205 lb 9.6 oz (93.3 kg)  SpO2 98%  BMI 28.68 kg/m2  BMI:Body mass index is 28.68 kg/(m^2).   GEN: NAD,   Psych: normal mood, normal affect      Labs/Studies:     I reviewed the efficacy and compliance report from his device. Data summarized on the HPI and the ASV compliance flow sheet.     Lab Results   Component Value Date    WBC 5.2 04/14/2017    HGB 14.1 04/14/2017    HCT 41.1 04/14/2017    MCV 86 04/14/2017     04/14/2017         Chemistry        Component Value Date/Time     04/14/2017 0818    K 4.0 04/14/2017 0818     (H) 04/14/2017 0818    CO2 25 04/14/2017 0818    BUN 29 (H) 04/14/2017 0818    CREATININE 1.03 04/14/2017 0818    GLU 98 04/14/2017 0818        Component Value Date/Time    CALCIUM 9.3 04/14/2017 0818    ALKPHOS 81 10/24/2016 1145    AST 23 10/24/2016 1145    ALT 18 10/24/2016 1145    BILITOT 1.6 (H) 10/24/2016 1145            No results found for: FERRITIN         Assessment and Plan:  In summary Minor Murcia is a 57 y.o. year old male who is here for review of his compliance download.    1.  Obstructive sleep apnea/central sleep apnea/sleep-related hypoxia  I congratulated patient on his excellent usage.  He " states that he feel the current pressure setting is comfortable.  I will have him stay on the current pressure settings and have him get a new mask to improve comfort.  2.  Hypersomnia  Resolved  3.  Other sleep disturbance     Patient verbalized understanding of these issues, agrees with the plan and all questions were answered today. Patient was given an opportuntity to voice any other symptoms or concerns not listed above. Patient did not have any other symptoms or concerns.      Patient told to return in 12 months. Patient instructed to stop at  to schedule appointment before leaving today.    Crescencio Cornell DO  Board Certified in Internal Medicine and Sleep Medicine  [facility].    I spent a total of 15 minutes of face-to-face encounter and more than 50% of the encounter was used for counseling or coordination of care.    (Note created with Dragon voice recognition and unintended spelling errors and word substitutions may occur)

## 2021-06-16 NOTE — PROGRESS NOTES
Patient ID: Minor Murcia is a 58 y.o. male.  /80  Pulse (!) 50  Wt 204 lb (92.5 kg)  SpO2 97%  BMI 28.45 kg/m2    Assessment/Plan:                Diagnoses and all orders for this visit:    CAD (coronary artery disease)  -     Comprehensive Metabolic Panel  -     Lipid Cascade FASTING  -     BNP(B-type Natriuretic Peptide)    Impaired fasting glucose  -     Comprehensive Metabolic Panel    Hyperlipidemia  -     Comprehensive Metabolic Panel  -     Lipid Lyman FASTING    Edema  -     HM2(CBC w/o Differential)  -     BNP(B-type Natriuretic Peptide)      DISCUSSION  The clinical scenario for his swelling is most consistent with dependent edema.  The specific triggering factor for his significant worsening last week is not known but it has resolved.  There were no accompanying symptoms of congestive heart failure.  Suspect that probably he had increased salt intake or some other factor that triggered this and has now resolved.  Do not feel we need to initiate diuretic therapy as overall he does not appear to be volume overloaded and his weight is down.  We will check labs as noted above to be certain.  Discussed with him ways to help reduce the chance of developing recurrent edema including low-salt diet, elevation of feet and consideration of compression type stockings.  Subjective:     HPI    Minor Murcia is a 58 y.o. male he has a history of coronary artery disease with an intervention this past year.  Most recent ejection fraction was measured on the stress test 1 year ago.  It was 59%.  He has not had any symptoms of heart failure.  He comes in today complaining of several days of lower extremity edema that was quite noticeable last week.  He reports in the morning he would wake up he would notice a little bit of swelling but throughout the day his leg size would increase and he felt that it became tender to the touch.  He did notice any redness and it was symmetric between both legs.  He had no other  symptoms such as shortness of breath or chest pain.  His weight is noted to have decreased by 1 pounds overall since his last visit in October today.  Patient reports the swelling has completely resolved and he only notes just a minimal indentation of his sock which is not unusual.  He is on his feet often at work usually up to 12 hours per day.  He does not report any long car trips or prolonged times of sitting.  He does not typically wear compression stockings.  He does not recall eating any particularly salty foods and we reviewed this extensively.  He overall otherwise feels well but is somewhat concerned by this.  He is due for some routine laboratory testing today.    Review of Systems  Complete review of systems is obtained.  Other than the specific considerations noted above complete review of systems is negative.        Objective:   Medications:  Current Outpatient Prescriptions   Medication Sig     aspirin 81 MG EC tablet Take 81 mg by mouth daily.     atorvastatin (LIPITOR) 80 MG tablet TAKE 1 TABLET BY MOUTH DAILY     cholecalciferol, vitamin D3, 1,000 unit tablet Take 1,000 Units by mouth daily.     clopidogrel (PLAVIX) 75 mg tablet TAKE 1 TABLET BY MOUTH ONCE DAILY     lisinopril (PRINIVIL,ZESTRIL) 20 MG tablet Take 1 tablet (20 mg total) by mouth daily.     metoprolol succinate (TOPROL-XL) 50 MG 24 hr tablet TAKE 1 TABLET(50 MG) BY MOUTH DAILY     nitroglycerin (NITROSTAT) 0.4 MG SL tablet ONE TABLET UNDER TONGUE AS NEEDED FOR CHEST PAIN MAY REPEAT EVERY 5 MINUTES X 2. IF NO RELIEF DIAL 911     terbinafine HCl (LAMISIL) 250 mg tablet Take 250 mg by mouth daily.     Allergies:  No Known Allergies    Tobacco:   reports that he has never smoked. He does not have any smokeless tobacco history on file.     Physical Exam      /80  Pulse (!) 50  Wt 204 lb (92.5 kg)  SpO2 97%  BMI 28.45 kg/m2    General Appearance:    Alert, cooperative, no distress   Eyes:    No conjunctival irritation, no scleral  icterus       Lungs:     Clear to auscultation bilaterally, respirations unlabored   Heart:    Regular rate and rhythm, S1 and S2 normal, no murmur, rub   or gallop   Extremities:  No significant concern identified, slight edema noted with indentation of sock.  No pitting no pain on palpation of the extremities   Skin:   Skin color, texture, turgor normal, no rashes or lesions   Neurologic:   Normal strength and sensation

## 2021-06-16 NOTE — TELEPHONE ENCOUNTER
RN cannot approve Refill Request    RN can NOT refill this medication Protocol failed and NO refill given. Last office visit: 3/9/2018 Valentino Lu MD Last Physical: Visit date not found Last MTM visit: Visit date not found Last visit same specialty: 1/16/2020 Elsa Scott MD.  Next visit within 3 mo: Visit date not found  Next physical within 3 mo: Visit date not found      Avi SUSANMeliza Farmer, [facility] Triage/Med Refill 3/19/2021    Requested Prescriptions   Pending Prescriptions Disp Refills     gabapentin (NEURONTIN) 300 MG capsule 90 capsule 3     Sig: TAKE 1 CAPSULE BY MOUTH AT BEDTIME       Gabapentin/Levetiracetam/Tiagabine Refill Protocol  Failed - 3/18/2021  1:44 PM        Failed - PCP or prescribing provider visit in past 12 months or next 3 months     Last office visit with prescriber/PCP: 3/9/2018 Valentino Lu MD OR same dept: Visit date not found OR same specialty: 1/16/2020 Elsa Scott MD  Last physical: Visit date not found Last MTM visit: Visit date not found   Next visit within 3 mo: Visit date not found  Next physical within 3 mo: Visit date not found  Prescriber OR PCP: Valentino Lu MD  Last diagnosis associated with med order: 1. Hip pain, left  - gabapentin (NEURONTIN) 300 MG capsule; TAKE 1 CAPSULE BY MOUTH AT BEDTIME  Dispense: 90 capsule; Refill: 3    If protocol passes may refill for 12 months if within 3 months of last provider visit (or a total of 15 months).

## 2021-06-19 NOTE — PROGRESS NOTES
[facility] Progress Note    Assessment:    1.  Coronary artery disease with no anginal symptoms reported  2.  Essential hypertension controlled  3.  Hyperlipidemia under statin therapy  4.  Obstructive sleep apnea on nocturnal CPAP    Plan:    Continue the patient's current medical regimen.  I have not arranged any specific follow-up for Minor at this time would be happy see him in the future further problems would develop    An After Visit Summary was printed and given to the patient.    Subjective:    58-year-old male who had stenting to his left anterior descending artery and right coronary artery in 2005.  He developed recurrent chest pain with re-stenting to his proximal left anterior descending artery in April 2017.  He was noted to have mild disease in the circumflex artery with a patent right coronary artery stent.  Over the last year he denies any recurrent chest pain.  He was recently diagnosed with obstructive sleep apnea and feels much better after beginning nocturnal CPAP therapy    Problem List:    Patient Active Problem List   Diagnosis     Hyperlipidemia     Impaired Fasting Glucose     Arteriosclerotic Cardiovascular Disease (ASCVD)     CAD (coronary artery disease)     Ischemic chest pain (H)     Abnormal stress test     Coronary artery disease involving native coronary artery of native heart, angina presence unspecified         Current Outpatient Prescriptions   Medication Sig Dispense Refill     aspirin 81 MG EC tablet Take 81 mg by mouth daily.       atorvastatin (LIPITOR) 80 MG tablet TAKE 1 TABLET BY MOUTH DAILY 90 tablet 1     cholecalciferol, vitamin D3, 1,000 unit tablet Take 1,000 Units by mouth daily.       lisinopril (PRINIVIL,ZESTRIL) 20 MG tablet TAKE 1 TABLET(20 MG TOTAL) BY MOUTH EVERY DAY. 90 tablet 0     magnesium oxide 500 mg Tab Take 500 mg by mouth daily.       metoprolol succinate (TOPROL-XL) 50 MG 24 hr tablet TAKE 1 TABLET(50 MG) BY MOUTH DAILY 90 tablet 3  "    nitroglycerin (NITROSTAT) 0.4 MG SL tablet ONE TABLET UNDER TONGUE AS NEEDED FOR CHEST PAIN MAY REPEAT EVERY 5 MINUTES X 2. IF NO RELIEF DIAL 911 25 tablet 1     vitamin E 400 unit capsule Take 400 Units by mouth daily.       metoprolol succinate (TOPROL-XL) 50 MG 24 hr tablet TAKE 1 TABLET(50 MG) BY MOUTH DAILY 90 tablet 1     terbinafine HCl (LAMISIL) 250 mg tablet Take 250 mg by mouth daily.       No current facility-administered medications for this visit.        .  Past Surgical History:   Procedure Laterality Date     CARDIAC CATHETERIZATION       CORONARY STENT PLACEMENT  2005     [place] CATH PLACEMENT & NJX CORONARY ART ANGIO IMG S&I N/A 4/14/2017    Procedure: Coronary Angiogram;  Surgeon: Roverto Hollis MD;  Location: [facility] Cath Lab;  Service: Cardiology     [place] CATH PLMT L HRT & ARTS W/NJX & ANGIO IMG S&I Left 4/14/2017    Procedure: Left Heart Catheterization Without Left Ventriculogram;  Surgeon: Roverto Hollis MD;  Location: [facility] Cath Lab;  Service: Cardiology       .  Social History     Social History     Marital status: Single     Spouse name: N/A     Number of children: N/A     Years of education: N/A     Occupational History     Not on file.     Social History Main Topics     Smoking status: Never Smoker     Smokeless tobacco: Never Used     Alcohol use Yes     Drug use: No     Sexual activity: Not on file     Other Topics Concern     Not on file     Social History Narrative       .  Family History   Problem Relation Age of Onset     Cancer Mother      Coronary artery disease Father      Review of Systems:  General: WNL  Eyes: WNL  Ears/Nose/Throat: WNL  Lungs: WNL  Heart: WNL  Stomach: WNL  Bladder: WNL  Muscle/Joints: WNL  Skin: WNL  Nervous System: WNL  Mental Health: WNL     Blood: WNL    Objective:     /72 (Patient Site: Right Arm, Patient Position: Sitting, Cuff Size: Adult Regular)  Pulse (!) 56  Resp 16  Ht 5' 11\" (1.803 m)  Wt 207 lb (93.9 kg)  BMI 28.87 kg/m2  207 " lb (93.9 kg)   [unfilled]    Physical Exam:    GENERAL APPEARANCE: alert, no apparent distress  HEENT: no scleral icterus or xanthelasma  NECK: jugular venous pressure normal  CHEST: symmetric, the lungs were clear to auscultation  CARDIOVASCULAR: regular rhythm without murmur, click, or gallop; no carotid bruits  ABDOMEN: nondistended, nontender, bowel sounds present  EXTREMITIES: no cyanosis, clubbing or edema.    Cardiac Testing:    None in the last year      Lab Results:    LIPIDS:  Lab Results   Component Value Date    CHOL 145 03/09/2018    CHOL 152 04/14/2017    CHOL 137 10/24/2016     Lab Results   Component Value Date    HDL 37 (L) 03/09/2018    HDL 39 (L) 04/14/2017    HDL 34 (L) 10/24/2016     Lab Results   Component Value Date    LDLCALC 96 03/09/2018    LDLCALC 98 04/14/2017    LDLCALC 91 10/24/2016     Lab Results   Component Value Date    TRIG 60 03/09/2018    TRIG 74 04/14/2017    TRIG 58 10/24/2016     No components found for: CHOLHDL    BMP:  Lab Results   Component Value Date    CREATININE 0.96 03/09/2018    BUN 17 03/09/2018     03/09/2018    K 4.8 03/09/2018     (H) 03/09/2018    CO2 26 03/09/2018         Lamont France MD,F.A.C.C.  Formerly [facility]  [phone]

## 2021-06-23 ENCOUNTER — RECORDS - HEALTHEAST (OUTPATIENT)
Dept: ADMINISTRATIVE | Facility: OTHER | Age: 61
End: 2021-06-23

## 2021-06-23 NOTE — TELEPHONE ENCOUNTER
Refill Approved    Rx renewed per Medication Renewal Policy. Medication was last renewed on 6/4/18 .    Amara Kelly, [facility] Triage/Med Refill 1/19/2019     Requested Prescriptions   Pending Prescriptions Disp Refills     lisinopril (PRINIVIL,ZESTRIL) 20 MG tablet 90 tablet 0    Ace Inhibitors Refill Protocol Passed - 1/19/2019 11:12 AM       Passed - PCP or prescribing provider visit in past 12 months      Last office visit with prescriber/PCP: 3/9/2018 Valentino Lu MD OR same dept: 3/9/2018 Valentino Lu MD OR same specialty: 3/9/2018 Valentino Lu MD  Last physical: 10/24/2016 Last MTM visit: Visit date not found   Next visit within 3 mo: Visit date not found  Next physical within 3 mo: Visit date not found  Prescriber OR PCP: Valentino Lu MD  Last diagnosis associated with med order: 1. Essential hypertension  - lisinopril (PRINIVIL,ZESTRIL) 20 MG tablet;   Dispense: 90 tablet; Refill: 0    If protocol passes may refill for 12 months if within 3 months of last provider visit (or a total of 15 months).            Passed - Serum Potassium in past 12 months    Lab Results   Component Value Date    Potassium 4.8 03/09/2018            Passed - Blood pressure filed in past 12 months    BP Readings from Last 1 Encounters:   08/20/18 122/72            Passed - Serum Creatinine in past 12 months    Creatinine   Date Value Ref Range Status   03/09/2018 0.96 0.70 - 1.30 mg/dL Final

## 2021-06-23 NOTE — TELEPHONE ENCOUNTER
Refill Approved    Rx renewed per Medication Renewal Policy. Medication was last renewed on 7/31/2018.    Last OV: 3/9/2018.    Maldonado Mckeon, [facility] Triage/Med Refill 2/2/2019     Requested Prescriptions   Pending Prescriptions Disp Refills     atorvastatin (LIPITOR) 80 MG tablet [Pharmacy Med Name: ATORVASTATIN 80MG TABLETS] 90 tablet 0     Sig: TAKE 1 TABLET BY MOUTH DAILY    Statins Refill Protocol (Hmg CoA Reductase Inhibitors) Passed - 2/1/2019  3:47 AM       Passed - PCP or prescribing provider visit in past 12 months     Last office visit with prescriber/PCP: 3/9/2018 Valentino Lu MD OR same dept: 3/9/2018 Valentino Lu MD OR same specialty: 3/9/2018 Valentino Lu MD  Last physical: 10/24/2016 Last MTM visit: Visit date not found   Next visit within 3 mo: Visit date not found  Next physical within 3 mo: Visit date not found  Prescriber OR PCP: Valentino Lu MD  Last diagnosis associated with med order: 1. Hyperlipidemia  - atorvastatin (LIPITOR) 80 MG tablet [Pharmacy Med Name: ATORVASTATIN 80MG TABLETS]; TAKE 1 TABLET BY MOUTH DAILY  Dispense: 90 tablet; Refill: 0    If protocol passes may refill for 12 months if within 3 months of last provider visit (or a total of 15 months).

## 2021-06-25 NOTE — TELEPHONE ENCOUNTER
RN cannot approve Refill Request    RN can NOT refill this medication PCP messaged that patient is overdue for Labs and Office Visit. Last office visit: 3/9/2018 Valentino Lu MD Last Physical: Visit date not found Last MTM visit: Visit date not found Last visit same specialty: 1/16/2020 Elsa Scott MD.  Next visit within 3 mo: Visit date not found  Next physical within 3 mo: Visit date not found      Megan Malloy, [facility] Triage/Med Refill 6/9/2021    Requested Prescriptions   Pending Prescriptions Disp Refills     lisinopriL (PRINIVIL,ZESTRIL) 20 MG tablet 90 tablet 0     Sig: Take 1 tablet (20 mg total) by mouth daily.       Ace Inhibitors Refill Protocol Failed - 6/8/2021 11:50 AM        Failed - PCP or prescribing provider visit in past 12 months       Last office visit with prescriber/PCP: 3/9/2018 Valentino Lu MD OR same dept: Visit date not found OR same specialty: 1/16/2020 Elsa Scott MD  Last physical: Visit date not found Last MTM visit: Visit date not found   Next visit within 3 mo: Visit date not found  Next physical within 3 mo: Visit date not found  Prescriber OR PCP: Valentino Lu MD  Last diagnosis associated with med order: 1. Essential hypertension  - lisinopriL (PRINIVIL,ZESTRIL) 20 MG tablet; Take 1 tablet (20 mg total) by mouth daily.  Dispense: 90 tablet; Refill: 0    2. CAD (coronary artery disease)  - metoprolol succinate (TOPROL-XL) 50 MG 24 hr tablet; Take 1 tablet (50 mg total) by mouth daily.  Dispense: 90 tablet; Refill: 0    If protocol passes may refill for 12 months if within 3 months of last provider visit (or a total of 15 months).             Failed - Serum Potassium in past 12 months     No results found for: LN-POTASSIUM          Failed - Blood pressure filed in past 12 months     BP Readings from Last 1 Encounters:   01/16/20 140/72             Failed - Serum Creatinine in past 12 months     Creatinine   Date Value Ref Range Status    03/09/2018 0.96 0.70 - 1.30 mg/dL Final                metoprolol succinate (TOPROL-XL) 50 MG 24 hr tablet 90 tablet 0     Sig: Take 1 tablet (50 mg total) by mouth daily.       Beta-Blockers Refill Protocol Failed - 6/8/2021 11:50 AM        Failed - PCP or prescribing provider visit in past 12 months or next 3 months     Last office visit with prescriber/PCP: 3/9/2018 Valentino Lu MD OR same dept: Visit date not found OR same specialty: 1/16/2020 Elsa Scott MD  Last physical: Visit date not found Last MTM visit: Visit date not found   Next visit within 3 mo: Visit date not found  Next physical within 3 mo: Visit date not found  Prescriber OR PCP: Valentino Lu MD  Last diagnosis associated with med order: 1. Essential hypertension  - lisinopriL (PRINIVIL,ZESTRIL) 20 MG tablet; Take 1 tablet (20 mg total) by mouth daily.  Dispense: 90 tablet; Refill: 0    2. CAD (coronary artery disease)  - metoprolol succinate (TOPROL-XL) 50 MG 24 hr tablet; Take 1 tablet (50 mg total) by mouth daily.  Dispense: 90 tablet; Refill: 0    If protocol passes may refill for 12 months if within 3 months of last provider visit (or a total of 15 months).             Failed - Blood pressure filed in past 12 months     BP Readings from Last 1 Encounters:   01/16/20 140/72

## 2021-06-27 ENCOUNTER — HEALTH MAINTENANCE LETTER (OUTPATIENT)
Age: 61
End: 2021-06-27

## 2021-07-23 DIAGNOSIS — E78.5 HYPERLIPIDEMIA, UNSPECIFIED HYPERLIPIDEMIA TYPE: ICD-10-CM

## 2021-07-23 RX ORDER — ATORVASTATIN CALCIUM 80 MG/1
80 TABLET, FILM COATED ORAL DAILY
Qty: 90 TABLET | Refills: 0 | Status: SHIPPED | OUTPATIENT
Start: 2021-07-23 | End: 2021-09-08

## 2021-07-23 NOTE — TELEPHONE ENCOUNTER
Patient is calling to request a refill of atorvastatin 80 mg daily. He reports he is completely out of medication.    I scheduled a physical for him on 9/20/2021

## 2021-07-27 DIAGNOSIS — M54.50 ACUTE BILATERAL LOW BACK PAIN WITHOUT SCIATICA: ICD-10-CM

## 2021-07-29 NOTE — TELEPHONE ENCOUNTER
Routing refill request to provider for review/approval because:  Drug not on the [facility] refill protocol   ___________________________________________________________    Copy of Last Rx:        Last office visit with provider:  3/9/18   ___________________________________________________________    Requested Prescriptions   Pending Prescriptions Disp Refills     cyclobenzaprine (FLEXERIL) 10 MG tablet [Pharmacy Med Name: CYCLOBENZAPRINE 10MG TABLETS] 10 tablet 0     Sig: TAKE 1 TABLET(10 MG) BY MOUTH THREE TIMES DAILY AS NEEDED FOR MUSCLE SPASMS       There is no refill protocol information for this order          Myrna Howard RN 07/29/21 6:56 PM

## 2021-07-30 RX ORDER — CYCLOBENZAPRINE HCL 10 MG
TABLET ORAL
Qty: 10 TABLET | Refills: 0 | Status: SHIPPED | OUTPATIENT
Start: 2021-07-30 | End: 2022-08-02

## 2021-09-06 DIAGNOSIS — I25.10 CORONARY ARTERY DISEASE INVOLVING NATIVE HEART WITHOUT ANGINA PECTORIS, UNSPECIFIED VESSEL OR LESION TYPE: ICD-10-CM

## 2021-09-06 DIAGNOSIS — I10 ESSENTIAL HYPERTENSION: ICD-10-CM

## 2021-09-06 RX ORDER — LISINOPRIL 20 MG/1
TABLET ORAL
Qty: 90 TABLET | Refills: 3 | Status: SHIPPED | OUTPATIENT
Start: 2021-09-06 | End: 2022-07-21

## 2021-09-06 RX ORDER — METOPROLOL SUCCINATE 50 MG/1
TABLET, EXTENDED RELEASE ORAL
Qty: 90 TABLET | Refills: 3 | Status: SHIPPED | OUTPATIENT
Start: 2021-09-06 | End: 2022-07-21

## 2021-09-06 NOTE — TELEPHONE ENCOUNTER
"Routing refill request to provider for review/approval because:  Labs not current:  BP  Patient needs to be seen because it has been more than 1 year since last office visit.    Last Written Prescription Date:  6/9/21  Last Fill Quantity: 90,  # refills: 0   Last office visit provider:  1/16/20     Requested Prescriptions   Pending Prescriptions Disp Refills     metoprolol succinate ER (TOPROL-XL) 50 MG 24 hr tablet [Pharmacy Med Name: METOPROLOL ER SUCCINATE 50MG TABS] 90 tablet 0     Sig: TAKE 1 TABLET(50 MG) BY MOUTH DAILY       Beta-Blockers Protocol Failed - 9/6/2021  6:41 AM        Failed - Blood pressure under 140/90 in past 12 months     BP Readings from Last 3 Encounters:   01/16/20 (!) 140/72   09/19/19 130/80                 Passed - Patient is age 6 or older        Passed - Recent (12 mo) or future (30 days) visit within the authorizing provider's specialty     Patient has had an office visit with the authorizing provider or a provider within the authorizing providers department within the previous 12 mos or has a future within next 30 days. See \"Patient Info\" tab in inbasket, or \"Choose Columns\" in Meds & Orders section of the refill encounter.              Passed - Medication is active on med list           lisinopril (ZESTRIL) 20 MG tablet [Pharmacy Med Name: LISINOPRIL 20MG TABLETS] 90 tablet 0     Sig: TAKE 1 TABLET(20 MG) BY MOUTH DAILY       ACE Inhibitors (Including Combos) Protocol Failed - 9/6/2021  6:41 AM        Failed - Blood pressure under 140/90 in past 12 months     BP Readings from Last 3 Encounters:   01/16/20 (!) 140/72   09/19/19 130/80                 Failed - Normal serum creatinine on file in past 12 months     Recent Labs   Lab Test 03/09/18  1034   CR 0.96       Ok to refill medication if creatinine is low          Failed - Normal serum potassium on file in past 12 months     Recent Labs   Lab Test 03/09/18  1034   POTASSIUM 4.8             Passed - Recent (12 mo) or future (30 " "days) visit within the authorizing provider's specialty     Patient has had an office visit with the authorizing provider or a provider within the authorizing providers department within the previous 12 mos or has a future within next 30 days. See \"Patient Info\" tab in inbasket, or \"Choose Columns\" in Meds & Orders section of the refill encounter.              Passed - Medication is active on med list        Passed - Patient is age 18 or older             Avi Farmer RN 09/06/21 10:55 AM  "

## 2021-09-07 DIAGNOSIS — E78.5 HYPERLIPIDEMIA, UNSPECIFIED HYPERLIPIDEMIA TYPE: ICD-10-CM

## 2021-09-08 RX ORDER — ATORVASTATIN CALCIUM 80 MG/1
80 TABLET, FILM COATED ORAL DAILY
Qty: 90 TABLET | Refills: 0 | Status: SHIPPED | OUTPATIENT
Start: 2021-09-08 | End: 2021-09-20

## 2021-09-20 ENCOUNTER — OFFICE VISIT (OUTPATIENT)
Dept: FAMILY MEDICINE | Facility: CLINIC | Age: 61
End: 2021-09-20
Payer: COMMERCIAL

## 2021-09-20 VITALS
HEIGHT: 71 IN | HEART RATE: 48 BPM | OXYGEN SATURATION: 96 % | BODY MASS INDEX: 27.97 KG/M2 | DIASTOLIC BLOOD PRESSURE: 76 MMHG | WEIGHT: 199.8 LBS | SYSTOLIC BLOOD PRESSURE: 126 MMHG

## 2021-09-20 DIAGNOSIS — G47.33 OSA (OBSTRUCTIVE SLEEP APNEA): ICD-10-CM

## 2021-09-20 DIAGNOSIS — E78.5 HYPERLIPIDEMIA, UNSPECIFIED HYPERLIPIDEMIA TYPE: ICD-10-CM

## 2021-09-20 DIAGNOSIS — I10 ESSENTIAL HYPERTENSION: ICD-10-CM

## 2021-09-20 DIAGNOSIS — Z00.00 ROUTINE HISTORY AND PHYSICAL EXAMINATION OF ADULT: Primary | ICD-10-CM

## 2021-09-20 DIAGNOSIS — I25.10 CORONARY ARTERY DISEASE INVOLVING NATIVE HEART WITHOUT ANGINA PECTORIS, UNSPECIFIED VESSEL OR LESION TYPE: ICD-10-CM

## 2021-09-20 DIAGNOSIS — N52.9 ERECTILE DYSFUNCTION, UNSPECIFIED ERECTILE DYSFUNCTION TYPE: ICD-10-CM

## 2021-09-20 DIAGNOSIS — M25.552 HIP PAIN, LEFT: ICD-10-CM

## 2021-09-20 LAB
ALBUMIN SERPL-MCNC: 4 G/DL (ref 3.5–5)
ALP SERPL-CCNC: 80 U/L (ref 45–120)
ALT SERPL W P-5'-P-CCNC: 26 U/L (ref 0–45)
ANION GAP SERPL CALCULATED.3IONS-SCNC: 12 MMOL/L (ref 5–18)
AST SERPL W P-5'-P-CCNC: 24 U/L (ref 0–40)
BILIRUB SERPL-MCNC: 1.1 MG/DL (ref 0–1)
BUN SERPL-MCNC: 18 MG/DL (ref 8–22)
CALCIUM SERPL-MCNC: 9.6 MG/DL (ref 8.5–10.5)
CHLORIDE BLD-SCNC: 107 MMOL/L (ref 98–107)
CHOLEST SERPL-MCNC: 159 MG/DL
CO2 SERPL-SCNC: 23 MMOL/L (ref 22–31)
CREAT SERPL-MCNC: 1.03 MG/DL (ref 0.7–1.3)
FASTING STATUS PATIENT QL REPORTED: YES
GFR SERPL CREATININE-BSD FRML MDRD: 77 ML/MIN/1.73M2
GLUCOSE BLD-MCNC: 91 MG/DL (ref 70–125)
HDLC SERPL-MCNC: 39 MG/DL
LDLC SERPL CALC-MCNC: 102 MG/DL
POTASSIUM BLD-SCNC: 4.3 MMOL/L (ref 3.5–5)
PROT SERPL-MCNC: 6.9 G/DL (ref 6–8)
SODIUM SERPL-SCNC: 142 MMOL/L (ref 136–145)
TRIGL SERPL-MCNC: 90 MG/DL

## 2021-09-20 PROCEDURE — 80061 LIPID PANEL: CPT | Performed by: FAMILY MEDICINE

## 2021-09-20 PROCEDURE — 90714 TD VACC NO PRESV 7 YRS+ IM: CPT | Performed by: FAMILY MEDICINE

## 2021-09-20 PROCEDURE — 99396 PREV VISIT EST AGE 40-64: CPT | Mod: 25 | Performed by: FAMILY MEDICINE

## 2021-09-20 PROCEDURE — 90471 IMMUNIZATION ADMIN: CPT | Performed by: FAMILY MEDICINE

## 2021-09-20 PROCEDURE — 80053 COMPREHEN METABOLIC PANEL: CPT | Performed by: FAMILY MEDICINE

## 2021-09-20 PROCEDURE — 36415 COLL VENOUS BLD VENIPUNCTURE: CPT | Performed by: FAMILY MEDICINE

## 2021-09-20 RX ORDER — SILDENAFIL 50 MG/1
50 TABLET, FILM COATED ORAL DAILY PRN
Qty: 30 TABLET | Refills: 1 | Status: SHIPPED | OUTPATIENT
Start: 2021-09-20 | End: 2022-08-02

## 2021-09-20 RX ORDER — GABAPENTIN 300 MG/1
CAPSULE ORAL
Qty: 90 CAPSULE | Refills: 3 | Status: SHIPPED | OUTPATIENT
Start: 2021-09-20 | End: 2022-08-02

## 2021-09-20 RX ORDER — ATORVASTATIN CALCIUM 80 MG/1
80 TABLET, FILM COATED ORAL DAILY
Qty: 90 TABLET | Refills: 0 | Status: SHIPPED | OUTPATIENT
Start: 2021-09-20 | End: 2022-02-14

## 2021-09-20 ASSESSMENT — PATIENT HEALTH QUESTIONNAIRE - PHQ9
SUM OF ALL RESPONSES TO PHQ QUESTIONS 1-9: 0
SUM OF ALL RESPONSES TO PHQ QUESTIONS 1-9: 0
10. IF YOU CHECKED OFF ANY PROBLEMS, HOW DIFFICULT HAVE THESE PROBLEMS MADE IT FOR YOU TO DO YOUR WORK, TAKE CARE OF THINGS AT HOME, OR GET ALONG WITH OTHER PEOPLE: NOT DIFFICULT AT ALL

## 2021-09-20 ASSESSMENT — MIFFLIN-ST. JEOR: SCORE: 1728.42

## 2021-09-20 NOTE — PROGRESS NOTES
"Minor Murcia  /76   Pulse (!) 48   Ht 1.803 m (5' 11\")   Wt 90.6 kg (199 lb 12.8 oz)   SpO2 96%   BMI 27.87 kg/m       Assessment/Plan:                Minor was seen today for physical and erectile dysfunction.    Diagnoses and all orders for this visit:    Routine history and physical examination of adult    Hyperlipidemia, unspecified hyperlipidemia type  -     Lipid panel reflex to direct LDL Fasting  -     Comprehensive metabolic panel (BMP + Alb, Alk Phos, ALT, AST, Total. Bili, TP)  -     atorvastatin (LIPITOR) 80 MG tablet; Take 1 tablet (80 mg) by mouth daily    Hip pain, left  -     gabapentin (NEURONTIN) 300 MG capsule; [GABAPENTIN (NEURONTIN) 300 MG CAPSULE] TAKE 1 CAPSULE BY MOUTH AT BEDTIME    Coronary artery disease involving native heart without angina pectoris, unspecified vessel or lesion type  -     Lipid panel reflex to direct LDL Fasting  -     Comprehensive metabolic panel (BMP + Alb, Alk Phos, ALT, AST, Total. Bili, TP)    Essential hypertension  -     Lipid panel reflex to direct LDL Fasting  -     Comprehensive metabolic panel (BMP + Alb, Alk Phos, ALT, AST, Total. Bili, TP)    Erectile dysfunction, unspecified erectile dysfunction type  -     sildenafil (VIAGRA) 50 MG tablet; Take 1 tablet (50 mg) by mouth daily as needed (ED)    LOUIS (obstructive sleep apnea)    Other orders  -     TD PRSERV FREE >=7 YRS ADS IM       DISCUSSION  Update tetanus shot.  Obtain labs as noted.  Trial of sildenafil for treating erectile dysfunction.  Recommend patient schedule colonoscopy for colon cancer screening.  Subjective:     HPI:    Minor Murcia is a 61 year old male is here today for physical.  His medical history includes coronary artery disease.  He had a work-up several years ago which showed lesion requiring stent placement.  He has not had any symptom concerns since his intervention.  He remains on medications as listed.  Due for routine labs.  Had previously followed with cardiology, the " last consultation note indicates no further need for follow-up unless symptom concerns exist.    He has obstructive sleep apnea, there is an element of both central and obstructive sleep apnea.  He uses CPAP nightly.  He obtains supplies on his own typically because of cost considerations this seems to be more cost effective for him.  He benefits from using the CPAP and plans to continue indefinite usage to treat his symptoms which have had great improvement.    He reports concerns with erectile dysfunction.  Multiple potential causes including use of gabapentin are discussed.  Consideration to taper off of medication.  Beta-blocker may also be a factor but feel that he should continue with this.  Discussed medication treatment with sildenafil.    Discussed management of musculoskeletal concerns.  He has hip pain and bilateral knee pain for which she sees orthopedic specialty provider reports ongoing concerns but no acute issue.  Feels he is a good source for helping manage this.    Discussed weight reduction.    Discussed routine health screening including immunization update and scheduling colonoscopy for which she is due.  Discussed again in some detail prostate cancer screening.  Previously had declined screening based on concern for potential harm.  He would like to continue to forego utilizing the PSA test and digital rectal exam at this point in time based on these concerns.        ROS:  Complete review of systems is obtained.  Other than the specific considerations noted above complete review of systems is negative.          Objective:   Medications:  Current Outpatient Medications   Medication     aspirin 81 MG EC tablet     atorvastatin (LIPITOR) 80 MG tablet     cholecalciferol, vitamin D3, 1,000 unit tablet     cyclobenzaprine (FLEXERIL) 10 MG tablet     gabapentin (NEURONTIN) 300 MG capsule     lisinopril (ZESTRIL) 20 MG tablet     magnesium oxide 500 mg Tab     metoprolol succinate ER (TOPROL-XL) 50 MG  24 hr tablet     nitroglycerin (NITROSTAT) 0.4 MG SL tablet     sildenafil (VIAGRA) 50 MG tablet     vitamin E 400 unit capsule     No current facility-administered medications for this visit.        Allergies:   No Known Allergies     Social History     Socioeconomic History     Marital status: Single     Spouse name: Not on file     Number of children: Not on file     Years of education: Not on file     Highest education level: Not on file   Occupational History     Not on file   Tobacco Use     Smoking status: Never Smoker     Smokeless tobacco: Never Used   Substance and Sexual Activity     Alcohol use: Yes     Alcohol/week: 1.0 - 2.0 standard drinks     Drug use: No     Sexual activity: Not on file   Other Topics Concern     Not on file   Social History Narrative     Not on file     Social Determinants of Health     Financial Resource Strain:      Difficulty of Paying Living Expenses:    Food Insecurity:      Worried About Running Out of Food in the Last Year:      Ran Out of Food in the Last Year:    Transportation Needs:      Lack of Transportation (Medical):      Lack of Transportation (Non-Medical):    Physical Activity:      Days of Exercise per Week:      Minutes of Exercise per Session:    Stress:      Feeling of Stress :    Social Connections:      Frequency of Communication with Friends and Family:      Frequency of Social Gatherings with Friends and Family:      Attends Christianity Services:      Active Member of Clubs or Organizations:      Attends Club or Organization Meetings:      Marital Status:    Intimate Partner Violence:      Fear of Current or Ex-Partner:      Emotionally Abused:      Physically Abused:      Sexually Abused:        Family History   Problem Relation Age of Onset     Cancer Mother      Coronary Artery Disease Father         Most Recent Immunizations   Administered Date(s) Administered     COVID-19,PF,Moderna 02/11/2021     DT (PEDS <7y) 07/16/2003     FLU 6-35 months 09/12/2015  "    Flu, Unspecified 09/21/2020     HepA, Unspecified 07/16/2003     HepA-Adult 07/16/2003     Influenza (H1N1) 12/18/2009     Influenza (IIV3) PF 09/03/2013     Influenza Vaccine IM > 6 months Valent IIV4 (Alfuria,Fluzone) 08/01/2019     Influenza Vaccine, 6+MO IM (QUADRIVALENT W/PRESERVATIVES) 10/01/2017     Influenza, Quad, High Dose, Pf, 65yr+ (Fluzone HD) 08/17/2020     Tdap (Adacel,Boostrix) 05/20/2011     Zoster vaccine recombinant adjuvanted (SHINGRIX) 09/13/2018   Pended Date(s) Pended     TD (ADULT, 7+) 09/20/2021        Wt Readings from Last 3 Encounters:   09/20/21 90.6 kg (199 lb 12.8 oz)   01/16/20 90.4 kg (199 lb 4 oz)   09/19/19 89.8 kg (197 lb 14.4 oz)        BP Readings from Last 6 Encounters:   09/20/21 126/76   01/16/20 (!) 140/72   09/19/19 130/80        PHYSICAL EXAM:    /76   Pulse (!) 48   Ht 1.803 m (5' 11\")   Wt 90.6 kg (199 lb 12.8 oz)   SpO2 96%   BMI 27.87 kg/m           General Appearance:    Alert, cooperative, no distress   Eyes:   No scleral icterus or conjunctival irritation       Ears:    Normal TM's and external ear canals, both ears   Throat:   Lips, mucosa, and tongue normal; teeth and gums normal   Neck:   Supple, symmetrical, trachea midline, no adenopathy;        thyroid:  No enlargement/tenderness/nodules   Lungs:     Clear to auscultation bilaterally, respirations unlabored, no wheezes or crackles   Heart:    Regular rate and rhythm,  No murmur   Abdomen:    Soft, no distention, no tenderness on palpation, no masses, no organomegaly     Extremities:  No edema, no joint swelling or redness, no evidence of any injuries   Skin:  There is a multitude of darker pigmented moles.  These are primarily on the trunk both back and front.  He has 1 larger mole that is on the right upper pectoral region.  It measures about 1 cm in its greatest diameter and has somewhat of an irregular border.  Patient states that this has looked similar for years and has not changed.  " Discussed continued close monitoring of his skin with low threshold for evaluation should any concern exist for changes.   Neurologic:  On gross examination there is no motor or sensory deficit.  Patient walks with a normal gait           Answers for HPI/ROS submitted by the patient on 9/20/2021  If you checked off any problems, how difficult have these problems made it for you to do your work, take care of things at home, or get along with other people?: Not difficult at all  PHQ9 TOTAL SCORE: 0  Frequency of exercise:: None  Getting at least 3 servings of Calcium per day:: NO  Diet:: Regular (no restrictions)  Taking medications regularly:: No  Medication side effects:: None  Bi-annual eye exam:: NO  Dental care twice a year:: Yes  Sleep apnea or symptoms of sleep apnea:: Sleep apnea  Additional concerns today:: Yes

## 2021-09-21 ASSESSMENT — PATIENT HEALTH QUESTIONNAIRE - PHQ9: SUM OF ALL RESPONSES TO PHQ QUESTIONS 1-9: 0

## 2021-10-11 ENCOUNTER — TELEPHONE (OUTPATIENT)
Dept: SLEEP MEDICINE | Facility: CLINIC | Age: 61
End: 2021-10-11

## 2021-10-11 NOTE — TELEPHONE ENCOUNTER
Reason for call:  Other   Patient called regarding (reason for call): call back  Additional comments: Has specific questions regarding what to do now that hiss CPAP was recalled and stated he needs his machine and not sure what he is supposed to do.    Phone number to reach patient:  Cell number on file:    Telephone Information:   Mobile [phone]       Best Time:  Anytime    Can we leave a detailed message on this number?  YES    Travel screening: Not Applicable

## 2021-10-12 NOTE — TELEPHONE ENCOUNTER
Patient called regarding [employer] recall for their pap device.     Device type: ASV    Supplemental Oxygen: No     Current Durable Medical Equipment supplier: Other DME     Age of your current device: less than 5 years old    Patient instructions:    Advised patient to continue using therapy until device is replaced or repaired.    Advised patient to avoid unapproved cleaning methods, such as ozone (see FDA safety communication on use of ozone ),.    Has the patient registered their device?  Yes Advised patient to register for repair or replacement on the [employer] website.  Patient can call [employer] at [phone] for additional support.    Bacterial filters to reduce exposure to particulates are sometimes cumbersome to use and are not currently recommended by the .If you choose to use a bacterial filter, consider discontinuing using humidity with the filter.     Please contact your DME to see if you are eligible to receive a new device if it is over 5 years old.    Does the patient have additional questions or concerns to be sent to the provider at this time?  No

## 2021-10-17 ENCOUNTER — HEALTH MAINTENANCE LETTER (OUTPATIENT)
Age: 61
End: 2021-10-17

## 2021-11-15 ENCOUNTER — TRANSFERRED RECORDS (OUTPATIENT)
Dept: HEALTH INFORMATION MANAGEMENT | Facility: CLINIC | Age: 61
End: 2021-11-15
Payer: COMMERCIAL

## 2021-12-29 ENCOUNTER — TRANSFERRED RECORDS (OUTPATIENT)
Dept: HEALTH INFORMATION MANAGEMENT | Facility: CLINIC | Age: 61
End: 2021-12-29
Payer: COMMERCIAL

## 2022-02-10 DIAGNOSIS — E78.5 HYPERLIPIDEMIA, UNSPECIFIED HYPERLIPIDEMIA TYPE: ICD-10-CM

## 2022-02-14 RX ORDER — ATORVASTATIN CALCIUM 80 MG/1
TABLET, FILM COATED ORAL
Qty: 90 TABLET | Refills: 2 | Status: SHIPPED | OUTPATIENT
Start: 2022-02-14 | End: 2023-02-16

## 2022-02-14 NOTE — TELEPHONE ENCOUNTER
"Last Written Prescription Date:  9/20/21  Last Fill Quantity: 90,  # refills: 0   Last office visit provider:  9/20/21     Requested Prescriptions   Pending Prescriptions Disp Refills     atorvastatin (LIPITOR) 80 MG tablet [Pharmacy Med Name: ATORVASTATIN 80MG TABLETS] 90 tablet 0     Sig: TAKE 1 TABLET(80 MG) BY MOUTH DAILY       Statins Protocol Passed - 2/14/2022  2:13 PM        Passed - LDL on file in past 12 months     Recent Labs   Lab Test 09/20/21  0800                Passed - No abnormal creatine kinase in past 12 months     No lab results found.             Passed - Recent (12 mo) or future (30 days) visit within the authorizing provider's specialty     Patient has had an office visit with the authorizing provider or a provider within the authorizing providers department within the previous 12 mos or has a future within next 30 days. See \"Patient Info\" tab in inbasket, or \"Choose Columns\" in Meds & Orders section of the refill encounter.              Passed - Medication is active on med list        Passed - Patient is age 18 or older             Avi Farmer RN 02/14/22 2:13 PM  "

## 2022-06-21 ENCOUNTER — TRANSFERRED RECORDS (OUTPATIENT)
Dept: HEALTH INFORMATION MANAGEMENT | Facility: CLINIC | Age: 62
End: 2022-06-21

## 2022-07-13 ENCOUNTER — TRANSFERRED RECORDS (OUTPATIENT)
Dept: HEALTH INFORMATION MANAGEMENT | Facility: CLINIC | Age: 62
End: 2022-07-13

## 2022-08-02 ENCOUNTER — OFFICE VISIT (OUTPATIENT)
Dept: FAMILY MEDICINE | Facility: CLINIC | Age: 62
End: 2022-08-02
Payer: COMMERCIAL

## 2022-08-02 VITALS
RESPIRATION RATE: 18 BRPM | HEIGHT: 71 IN | DIASTOLIC BLOOD PRESSURE: 78 MMHG | SYSTOLIC BLOOD PRESSURE: 134 MMHG | BODY MASS INDEX: 26.82 KG/M2 | OXYGEN SATURATION: 98 % | WEIGHT: 191.6 LBS | HEART RATE: 56 BPM

## 2022-08-02 DIAGNOSIS — G47.33 OSA (OBSTRUCTIVE SLEEP APNEA): ICD-10-CM

## 2022-08-02 DIAGNOSIS — I25.10 CORONARY ARTERY DISEASE INVOLVING NATIVE HEART WITHOUT ANGINA PECTORIS, UNSPECIFIED VESSEL OR LESION TYPE: ICD-10-CM

## 2022-08-02 DIAGNOSIS — I49.8 BIGEMINY: ICD-10-CM

## 2022-08-02 DIAGNOSIS — R00.0 RACING HEART BEAT: Primary | ICD-10-CM

## 2022-08-02 DIAGNOSIS — I10 ESSENTIAL HYPERTENSION: ICD-10-CM

## 2022-08-02 DIAGNOSIS — E78.5 HYPERLIPIDEMIA, UNSPECIFIED HYPERLIPIDEMIA TYPE: ICD-10-CM

## 2022-08-02 LAB
ALBUMIN SERPL BCG-MCNC: 4.6 G/DL (ref 3.5–5.2)
ALP SERPL-CCNC: 72 U/L (ref 40–129)
ALT SERPL W P-5'-P-CCNC: 33 U/L (ref 10–50)
ANION GAP SERPL CALCULATED.3IONS-SCNC: 10 MMOL/L (ref 7–15)
AST SERPL W P-5'-P-CCNC: 33 U/L (ref 10–50)
BILIRUB SERPL-MCNC: 1 MG/DL
BUN SERPL-MCNC: 19.6 MG/DL (ref 8–23)
CALCIUM SERPL-MCNC: 9.5 MG/DL (ref 8.8–10.2)
CHLORIDE SERPL-SCNC: 105 MMOL/L (ref 98–107)
CHOLEST SERPL-MCNC: 174 MG/DL
CREAT SERPL-MCNC: 0.94 MG/DL (ref 0.67–1.17)
DEPRECATED HCO3 PLAS-SCNC: 26 MMOL/L (ref 22–29)
ERYTHROCYTE [DISTWIDTH] IN BLOOD BY AUTOMATED COUNT: 12.6 % (ref 10–15)
GFR SERPL CREATININE-BSD FRML MDRD: >90 ML/MIN/1.73M2
GLUCOSE SERPL-MCNC: 99 MG/DL (ref 70–99)
HCT VFR BLD AUTO: 42 % (ref 40–53)
HDLC SERPL-MCNC: 49 MG/DL
HGB BLD-MCNC: 14.2 G/DL (ref 13.3–17.7)
LDLC SERPL CALC-MCNC: 106 MG/DL
MCH RBC QN AUTO: 30.7 PG (ref 26.5–33)
MCHC RBC AUTO-ENTMCNC: 33.8 G/DL (ref 31.5–36.5)
MCV RBC AUTO: 91 FL (ref 78–100)
NONHDLC SERPL-MCNC: 125 MG/DL
PLATELET # BLD AUTO: 198 10E3/UL (ref 150–450)
POTASSIUM SERPL-SCNC: 4.8 MMOL/L (ref 3.4–5.3)
PROT SERPL-MCNC: 7.1 G/DL (ref 6.4–8.3)
RBC # BLD AUTO: 4.62 10E6/UL (ref 4.4–5.9)
SODIUM SERPL-SCNC: 141 MMOL/L (ref 136–145)
TRIGL SERPL-MCNC: 94 MG/DL
TSH SERPL DL<=0.005 MIU/L-ACNC: 0.86 UIU/ML (ref 0.3–4.2)
WBC # BLD AUTO: 7.3 10E3/UL (ref 4–11)

## 2022-08-02 PROCEDURE — 93010 ELECTROCARDIOGRAM REPORT: CPT | Performed by: INTERNAL MEDICINE

## 2022-08-02 PROCEDURE — 80053 COMPREHEN METABOLIC PANEL: CPT | Performed by: FAMILY MEDICINE

## 2022-08-02 PROCEDURE — 93005 ELECTROCARDIOGRAM TRACING: CPT | Performed by: FAMILY MEDICINE

## 2022-08-02 PROCEDURE — 80061 LIPID PANEL: CPT | Performed by: FAMILY MEDICINE

## 2022-08-02 PROCEDURE — 99214 OFFICE O/P EST MOD 30 MIN: CPT | Performed by: FAMILY MEDICINE

## 2022-08-02 PROCEDURE — 36415 COLL VENOUS BLD VENIPUNCTURE: CPT | Performed by: FAMILY MEDICINE

## 2022-08-02 PROCEDURE — 84443 ASSAY THYROID STIM HORMONE: CPT | Performed by: FAMILY MEDICINE

## 2022-08-02 PROCEDURE — 85027 COMPLETE CBC AUTOMATED: CPT | Performed by: FAMILY MEDICINE

## 2022-08-02 RX ORDER — MULTIVIT WITH MINERALS/LUTEIN
1 TABLET ORAL DAILY
COMMUNITY

## 2022-08-02 ASSESSMENT — PAIN SCALES - GENERAL: PAINLEVEL: NO PAIN (0)

## 2022-08-02 NOTE — PROGRESS NOTES
"Minor Murcia  /78   Pulse 56   Resp 18   Ht 1.803 m (5' 11\")   Wt 86.9 kg (191 lb 9.6 oz)   SpO2 98%   BMI 26.72 kg/m       Assessment/Plan:                Diagnoses and all orders for this visit:    Racing heart beat  -     CBC with platelets  -     EKG 12-lead, tracing only  -     Adult Holter Monitor 24 hour; Future  -     Adult Cardiology Eval  Referral; Future  -     TSH with free T4 reflex    Screening for HIV (human immunodeficiency virus)    Need for hepatitis C screening test    Coronary artery disease involving native heart without angina pectoris, unspecified vessel or lesion type  -     Comprehensive metabolic panel (BMP + Alb, Alk Phos, ALT, AST, Total. Bili, TP)  -     Lipid panel reflex to direct LDL Fasting  -     CBC with platelets  -     EKG 12-lead, tracing only  -     Adult Cardiology Eval  Referral; Future         DISCUSSION  Unclear as to the etiology of his arrhythmia that occurred now 3 weeks ago.  It is fortunate that it has not recurred.  On auscultation he has what sounds like a bigeminal rhythm and this is confirmed with an EKG today.  His rate is 61 bpm.  There is no change in the structure of the QRS complex in any lead to suggest a more significant concern at this time.  Prior EKG had shown first-degree AV block.    We will check electrolytes kidney function and other lab test today.    We will arrange for Holter monitor testing get him into see cardiology as soon as possible.  If he has any recurrent episodes of this arrhythmia I would encourage him to seek emergent care for evaluation and treatment.    For now continue current medications including aspirin metoprolol lisinopril, atorvastatin and keep nitroglycerin on hand.  Subjective:     HPI:    Minor Murcia is a 62 year old male has a medical history that includes coronary artery disease.  He had a stent placed in the past and is on medications for risk reduction.  His obstructive sleep apnea with " combination of both central and obstructive sleep apnea uses a CPAP.    His main concern is that 3 weeks ago while at work he was doing some physical activity and started to notice that his heart was racing and pounding in his chest.  He is uncertain as to how high his pulse was but when he started to look like he was becoming pale and diaphoretic coworkers called an ambulance.  He reports prior to the ambulance his arrival his symptoms resolved.  They lasted about 5 to 10 minutes.  He received an EKG that was normal his vital signs were normal with the exception of some elevated blood pressure.  He reports no recurrence of similar episodes.  Patient reports that he may have had another episode somewhat similar to this several years ago.  He does have a heart history with coronary artery disease.  He does not report any dyspnea on exertion chest pain concerns or other things that would suggest any obvious symptoms arising from his underlying condition.  He remains on medications to control risk factors.  Blood pressure is in a reasonable range but somewhat borderline today.    He does not typically follow with cardiology on a regular basis.  Has not had routine cardiac testing recently.  Does use his CPAP for treatment of obstructive sleep apnea.  Reports good symptom benefit from continued use.    He has bilateral knee osteoarthritis but otherwise reports he is feeling well.    ROS:  Complete review of systems is obtained.  Other than the specific considerations noted above complete review of systems is negative.          Objective:   Medications:  Current Outpatient Medications   Medication     aspirin 81 MG EC tablet     atorvastatin (LIPITOR) 80 MG tablet     cholecalciferol, vitamin D3, 1,000 unit tablet     lisinopril (ZESTRIL) 20 MG tablet     magnesium oxide 500 mg Tab     metoprolol succinate ER (TOPROL XL) 50 MG 24 hr tablet     multivitamin (CENTRUM SILVER) tablet     nitroglycerin (NITROSTAT) 0.4 MG SL  tablet     vitamin E 400 unit capsule     No current facility-administered medications for this visit.        Allergies:   No Known Allergies     Social History     Socioeconomic History     Marital status: Single     Spouse name: Not on file     Number of children: Not on file     Years of education: Not on file     Highest education level: Not on file   Occupational History     Not on file   Tobacco Use     Smoking status: Never Smoker     Smokeless tobacco: Never Used   Substance and Sexual Activity     Alcohol use: Yes     Alcohol/week: 1.0 - 2.0 standard drink     Drug use: No     Sexual activity: Not on file   Other Topics Concern     Not on file   Social History Narrative     Not on file     Social Determinants of Health     Financial Resource Strain: Not on file   Food Insecurity: Not on file   Transportation Needs: Not on file   Physical Activity: Not on file   Stress: Not on file   Social Connections: Not on file   Intimate Partner Violence: Not on file   Housing Stability: Not on file       Family History   Problem Relation Age of Onset     Cancer Mother      Coronary Artery Disease Father         Most Recent Immunizations   Administered Date(s) Administered     COVID-19,PF,Moderna 02/11/2021     COVID-19,PF,Pfizer (12+ Yrs) 11/22/2021     DT (PEDS <7y) 07/16/2003     FLU 6-35 months 09/12/2015     Flu, Unspecified 09/21/2020     HepA, Unspecified 07/16/2003     HepA-Adult 07/16/2003     Influenza (H1N1) 12/18/2009     Influenza (IIV3) PF 09/03/2013     Influenza Vaccine IM > 6 months Valent IIV4 (Alfuria,Fluzone) 08/01/2019     Influenza Vaccine, 6+MO IM (QUADRIVALENT W/PRESERVATIVES) 11/22/2021     Influenza, Quad, High Dose, Pf, 65yr+ (Fluzone HD) 08/17/2020     TD (ADULT, 7+) 09/20/2021     Tdap (Adacel,Boostrix) 05/20/2011     Zoster vaccine recombinant adjuvanted (SHINGRIX) 09/13/2018        Wt Readings from Last 3 Encounters:   08/02/22 86.9 kg (191 lb 9.6 oz)   09/20/21 90.6 kg (199 lb 12.8 oz)  "  01/16/20 90.4 kg (199 lb 4 oz)        BP Readings from Last 6 Encounters:   08/02/22 134/78   09/20/21 126/76   01/16/20 (!) 140/72   09/19/19 130/80        PHYSICAL EXAM:    /78   Pulse 56   Resp 18   Ht 1.803 m (5' 11\")   Wt 86.9 kg (191 lb 9.6 oz)   SpO2 98%   BMI 26.72 kg/m           General Appearance:    Alert, cooperative, no distress   Eyes:   No scleral icterus or conjunctival irritation       Lungs:     Clear to auscultation bilaterally, respirations unlabored, no wheezes or crackles   Heart:   2 beats with a short pause heard on auscultation.  No murmur.   Extremities:  No edema, no joint swelling or redness, no evidence of any injuries   Skin:  No concerning skin findings, no suspicious moles, no rashes   Neurologic:  On gross examination there is no motor or sensory deficit.  Patient walks with a normal gait                                       "

## 2022-08-03 ENCOUNTER — TELEPHONE (OUTPATIENT)
Dept: FAMILY MEDICINE | Facility: CLINIC | Age: 62
End: 2022-08-03

## 2022-08-03 LAB
ATRIAL RATE - MUSE: 61 BPM
DIASTOLIC BLOOD PRESSURE - MUSE: NORMAL MMHG
INTERPRETATION ECG - MUSE: NORMAL
P AXIS - MUSE: 46 DEGREES
PR INTERVAL - MUSE: 182 MS
QRS DURATION - MUSE: 106 MS
QT - MUSE: 424 MS
QTC - MUSE: 426 MS
R AXIS - MUSE: 27 DEGREES
SYSTOLIC BLOOD PRESSURE - MUSE: NORMAL MMHG
T AXIS - MUSE: 6 DEGREES
VENTRICULAR RATE- MUSE: 61 BPM

## 2022-08-04 ENCOUNTER — HOSPITAL ENCOUNTER (OUTPATIENT)
Dept: CARDIOLOGY | Facility: CLINIC | Age: 62
Discharge: HOME | End: 2022-08-04
Attending: FAMILY MEDICINE | Admitting: FAMILY MEDICINE
Payer: COMMERCIAL

## 2022-08-04 DIAGNOSIS — R00.0 RACING HEART BEAT: ICD-10-CM

## 2022-08-04 PROCEDURE — 93226 XTRNL ECG REC<48 HR SCAN A/R: CPT

## 2022-08-08 PROCEDURE — 93227 XTRNL ECG REC<48 HR R&I: CPT | Performed by: INTERNAL MEDICINE

## 2022-08-26 ENCOUNTER — OFFICE VISIT (OUTPATIENT)
Dept: CARDIOLOGY | Facility: CLINIC | Age: 62
End: 2022-08-26
Attending: FAMILY MEDICINE
Payer: COMMERCIAL

## 2022-08-26 VITALS
HEART RATE: 56 BPM | DIASTOLIC BLOOD PRESSURE: 70 MMHG | RESPIRATION RATE: 16 BRPM | HEIGHT: 71 IN | SYSTOLIC BLOOD PRESSURE: 126 MMHG | WEIGHT: 189 LBS | BODY MASS INDEX: 26.46 KG/M2

## 2022-08-26 DIAGNOSIS — R00.0 RACING HEART BEAT: ICD-10-CM

## 2022-08-26 DIAGNOSIS — E78.2 MIXED HYPERLIPIDEMIA: ICD-10-CM

## 2022-08-26 DIAGNOSIS — I49.8 BIGEMINY: ICD-10-CM

## 2022-08-26 DIAGNOSIS — R55 NEAR SYNCOPE: ICD-10-CM

## 2022-08-26 DIAGNOSIS — I25.10 CORONARY ARTERY DISEASE INVOLVING NATIVE HEART WITHOUT ANGINA PECTORIS, UNSPECIFIED VESSEL OR LESION TYPE: ICD-10-CM

## 2022-08-26 DIAGNOSIS — R00.2 RAPID PALPITATIONS: Primary | ICD-10-CM

## 2022-08-26 PROCEDURE — 99204 OFFICE O/P NEW MOD 45 MIN: CPT | Performed by: INTERNAL MEDICINE

## 2022-08-26 RX ORDER — NITROGLYCERIN 0.4 MG/1
TABLET SUBLINGUAL
Qty: 30 TABLET | Refills: 3 | Status: SHIPPED | OUTPATIENT
Start: 2022-08-26 | End: 2022-09-29

## 2022-08-26 RX ORDER — EZETIMIBE 10 MG/1
10 TABLET ORAL DAILY
Qty: 90 TABLET | Refills: 3 | Status: SHIPPED | OUTPATIENT
Start: 2022-08-26 | End: 2023-09-08

## 2022-08-26 NOTE — PROGRESS NOTES
HEART CARE ENCOUNTER CONSULTATON NOTE      [facility]  [phone]      Assessment/Recommendations   Assessment:   1.  Coronary artery disease status post percutaneous coronary intervention in 2017 and 2005  2.  Hyperlipidemia, LDL not at goal.  Goal should be less than 70.  3.  Near syncope  4.  Rapid palpitations  5.  Hypertension controlled    Plan:   1.  Recommend echo and stress test.  Recommend echo stress testing given patient has nonspecific ST segment abnormalities, interventricular conduction delay and frequent PACs on electrocardiogram.  2.  Recommend starting Zetia 10 mg daily along with atorvastatin 80 mg daily  3.  Refill nitroglycerin  4.  Continue lisinopril 20 mg daily for hypertension continue lisinopril for hypertension  5.  Continue metoprolol for coronary disease and hypertension.  6.  Aspirin 81 mg lifelong         History of Present Illness/Subjective    HPI: Minor Murcia is a 62 year old male coronary disease, hypertension, hyperlipidemia presents to cardiology clinic in consultation for recent near syncopal event associated rapid palpitations.    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  His manager felt that he was quite pale and dusky.  He called EMS.  On EMS arrival the patient was stabilized.  His symptoms of rapid palpitations last about 10 to 20 minutes.  He did not have loss consciousness.    In 2017 he had a syncopal episode which prompted further evaluation of his coronary disease at that time he had significant obstructive disease which was confirmed on angiogram.    Initial myocardial infarction in 2005 with associated with pain in his jaw along with chest pain.  He has not had anginal symptoms last few weeks.    No prior history of syncope.  He is compliant with medications.  Recently underwent cardiac monitoring which did not demonstrate any cardiac arrhythmias.  This was after his event at  "work.  No prior echocardiogram.  Last stress test was in 2017     Physical Examination  Review of Systems   Vitals: /70 (BP Location: Left arm, Patient Position: Sitting, Cuff Size: Adult Regular)   Pulse 56   Resp 16   Ht 1.803 m (5' 11\")   Wt 85.7 kg (189 lb)   BMI 26.36 kg/m    BMI= Body mass index is 26.36 kg/m .  Wt Readings from Last 3 Encounters:   08/26/22 85.7 kg (189 lb)   08/02/22 86.9 kg (191 lb 9.6 oz)   09/20/21 90.6 kg (199 lb 12.8 oz)        Pleasant   ENT/Mouth: membranes moist, no oral lesions or bleeding gums.      EYES:  no scleral icterus, normal conjunctivae       Chest/Lungs:   lungs are clear to auscultation, no rales or wheezing, no sternal scar, equal chest wall expansion    Cardiovascular:   Regular. Normal first and second heart sounds with no murmurs, rubs, or gallops; the carotid, radial and posterior tibial pulses are intact, Jugular venous pressure normal, no edema bilaterally    Abdomen:  no tenderness; bowel sounds are present   Extremities: no cyanosis or clubbing   Skin: no xanthelasma, warm.    Neurologic: normal  bilateral, no tremors     Psychiatric: alert and oriented x3, calm        Please refer above for cardiac ROS details.        Medical History  Surgical History Family History Social History   Past Medical History:   Diagnosis Date     Coronary artery disease      High cholesterol      Hypertension      Myocardial infarction (H) 2005     Past Surgical History:   Procedure Laterality Date     CARDIAC CATHETERIZATION       CORONARY STENT PLACEMENT  2005     [place] CATH PLACEMENT & NJX CORONARY ART ANGIO IMG S&I N/A 4/14/2017    Procedure: Coronary Angiogram;  Surgeon: Roverto Hollis MD;  Location: [facility] Lab;  Service: Cardiology     [place] CATH PLMT L HRT & ARTS W/NJX & ANGIO IMG S&I Left 4/14/2017    Procedure: Left Heart Catheterization Without Left Ventriculogram;  Surgeon: Roverto Hollis MD;  Location: [facility] Cath Lab;  Service: Cardiology     " Family History   Problem Relation Age of Onset     Cancer Mother      Coronary Artery Disease Father         Social History     Socioeconomic History     Marital status: Single     Spouse name: Not on file     Number of children: Not on file     Years of education: Not on file     Highest education level: Not on file   Occupational History     Not on file   Tobacco Use     Smoking status: Never Smoker     Smokeless tobacco: Never Used   Substance and Sexual Activity     Alcohol use: Yes     Alcohol/week: 1.0 - 2.0 standard drink     Drug use: No     Sexual activity: Not on file   Other Topics Concern     Not on file   Social History Narrative     Not on file     Social Determinants of Health     Financial Resource Strain: Not on file   Food Insecurity: Not on file   Transportation Needs: Not on file   Physical Activity: Not on file   Stress: Not on file   Social Connections: Not on file   Intimate Partner Violence: Not on file   Housing Stability: Not on file           Medications  Allergies   Current Outpatient Medications   Medication Sig Dispense Refill     aspirin 81 MG EC tablet [ASPIRIN 81 MG EC TABLET] Take 81 mg by mouth daily.       atorvastatin (LIPITOR) 80 MG tablet TAKE 1 TABLET(80 MG) BY MOUTH DAILY 90 tablet 2     ezetimibe (ZETIA) 10 MG tablet Take 1 tablet (10 mg) by mouth daily 90 tablet 3     lisinopril (ZESTRIL) 20 MG tablet Take 1 tablet (20 mg) by mouth daily 90 tablet 1     metoprolol succinate ER (TOPROL XL) 50 MG 24 hr tablet Take 1 tablet (50 mg) by mouth daily 90 tablet 1     multivitamin (CENTRUM SILVER) tablet Take 1 tablet by mouth daily       nitroglycerin (NITROSTAT) 0.4 MG SL tablet [NITROGLYCERIN (NITROSTAT) 0.4 MG SL TABLET] ONE TABLET UNDER TONGUE AS NEEDED FOR CHEST PAIN MAY REPEAT EVERY 5 MINUTES X 2. IF NO RELIEF DIAL 911 25 tablet 1     nitroGLYcerin (NITROSTAT) 0.4 MG sublingual tablet For chest pain place 1 tablet under the tongue every 5 minutes for 3 doses. If symptoms  persist 5 minutes after 1st dose call 911. 30 tablet 3     cholecalciferol, vitamin D3, 1,000 unit tablet [CHOLECALCIFEROL, VITAMIN D3, 1,000 UNIT TABLET] Take 1,000 Units by mouth daily. (Patient not taking: Reported on 8/26/2022)       magnesium oxide 500 mg Tab [MAGNESIUM OXIDE 500 MG TAB] Take 500 mg by mouth daily. (Patient not taking: Reported on 8/26/2022)       vitamin E 400 unit capsule [VITAMIN E 400 UNIT CAPSULE] Take 400 Units by mouth daily. (Patient not taking: Reported on 8/26/2022)       No Known Allergies       Lab Results    Chemistry/lipid CBC Cardiac Enzymes/BNP/TSH/INR   Recent Labs   Lab Test 08/02/22  1135   CHOL 174   HDL 49   *   TRIG 94     Recent Labs   Lab Test 08/02/22  1135 09/20/21  0800 03/09/18  1034   * 102 96     Recent Labs   Lab Test 08/02/22  1135      POTASSIUM 4.8   CHLORIDE 105   CO2 26   GLC 99   BUN 19.6   CR 0.94   GFRESTIMATED >90   CHRISSY 9.5     Recent Labs   Lab Test 08/02/22  1135 09/20/21  0800 03/09/18  1034   CR 0.94 1.03 0.96     No results for input(s): A1C in the last 14244 hours.       Recent Labs   Lab Test 08/02/22  1135   WBC 7.3   HGB 14.2   HCT 42.0   MCV 91        Recent Labs   Lab Test 08/02/22  1135 03/09/18  1034   HGB 14.2 14.3    No results for input(s): TROPONINI in the last 53813 hours.  Recent Labs   Lab Test 03/09/18  1034   *     Recent Labs   Lab Test 08/02/22  1135   TSH 0.86     No results for input(s): INR in the last 63041 hours.     Jadiel Alva, DO

## 2022-08-26 NOTE — LETTER
8/26/2022    Valentino Lu MD, MD  [address]  [facility] [address]    RE: Minor Murcia       Dear Colleague,     I had the pleasure of seeing Minor Murcia in the [facility].    HEART CARE ENCOUNTER CONSULTATON NOTE      [facility]  [phone]      Assessment/Recommendations   Assessment:   1.  Coronary artery disease status post percutaneous coronary intervention in 2017 and 2005  2.  Hyperlipidemia, LDL not at goal.  Goal should be less than 70.  3.  Near syncope  4.  Rapid palpitations  5.  Hypertension controlled    Plan:   1.  Recommend echo and stress test.  Recommend echo stress testing given patient has nonspecific ST segment abnormalities, interventricular conduction delay and frequent PACs on electrocardiogram.  2.  Recommend starting Zetia 10 mg daily along with atorvastatin 80 mg daily  3.  Refill nitroglycerin  4.  Continue lisinopril 20 mg daily for hypertension continue lisinopril for hypertension  5.  Continue metoprolol for coronary disease and hypertension.  6.  Aspirin 81 mg lifelong         History of Present Illness/Subjective    HPI: Minor Murcia is a 62 year old male coronary disease, hypertension, hyperlipidemia presents to cardiology clinic in consultation for recent near syncopal event associated rapid palpitations.    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  His manager felt that he was quite pale and dusky.  He called EMS.  On EMS arrival the patient was stabilized.  His symptoms of rapid palpitations last about 10 to 20 minutes.  He did not have loss consciousness.    In 2017 he had a syncopal episode which prompted further evaluation of his coronary disease at that time he had significant obstructive disease which was confirmed on angiogram.    Initial myocardial infarction in 2005 with associated with pain in his jaw along with chest pain.  He has not had anginal  "symptoms last few weeks.    No prior history of syncope.  He is compliant with medications.  Recently underwent cardiac monitoring which did not demonstrate any cardiac arrhythmias.  This was after his event at work.  No prior echocardiogram.  Last stress test was in 2017     Physical Examination  Review of Systems   Vitals: /70 (BP Location: Left arm, Patient Position: Sitting, Cuff Size: Adult Regular)   Pulse 56   Resp 16   Ht 1.803 m (5' 11\")   Wt 85.7 kg (189 lb)   BMI 26.36 kg/m    BMI= Body mass index is 26.36 kg/m .  Wt Readings from Last 3 Encounters:   08/26/22 85.7 kg (189 lb)   08/02/22 86.9 kg (191 lb 9.6 oz)   09/20/21 90.6 kg (199 lb 12.8 oz)        Pleasant   ENT/Mouth: membranes moist, no oral lesions or bleeding gums.      EYES:  no scleral icterus, normal conjunctivae       Chest/Lungs:   lungs are clear to auscultation, no rales or wheezing, no sternal scar, equal chest wall expansion    Cardiovascular:   Regular. Normal first and second heart sounds with no murmurs, rubs, or gallops; the carotid, radial and posterior tibial pulses are intact, Jugular venous pressure normal, no edema bilaterally    Abdomen:  no tenderness; bowel sounds are present   Extremities: no cyanosis or clubbing   Skin: no xanthelasma, warm.    Neurologic: normal  bilateral, no tremors     Psychiatric: alert and oriented x3, calm        Please refer above for cardiac ROS details.        Medical History  Surgical History Family History Social History   Past Medical History:   Diagnosis Date     Coronary artery disease      High cholesterol      Hypertension      Myocardial infarction (H) 2005     Past Surgical History:   Procedure Laterality Date     CARDIAC CATHETERIZATION       CORONARY STENT PLACEMENT  2005     [place] CATH PLACEMENT & NJX CORONARY ART ANGIO IMG S&I N/A 4/14/2017    Procedure: Coronary Angiogram;  Surgeon: Roverto Hollis MD;  Location: [facility] Lab;  Service: Cardiology     [place] CATH " PLMT L HRT & ARTS W/NJX & ANGIO IMG S&I Left 4/14/2017    Procedure: Left Heart Catheterization Without Left Ventriculogram;  Surgeon: Roverto Hollis MD;  Location: [facility] Cath Lab;  Service: Cardiology     Family History   Problem Relation Age of Onset     Cancer Mother      Coronary Artery Disease Father         Social History     Socioeconomic History     Marital status: Single     Spouse name: Not on file     Number of children: Not on file     Years of education: Not on file     Highest education level: Not on file   Occupational History     Not on file   Tobacco Use     Smoking status: Never Smoker     Smokeless tobacco: Never Used   Substance and Sexual Activity     Alcohol use: Yes     Alcohol/week: 1.0 - 2.0 standard drink     Drug use: No     Sexual activity: Not on file   Other Topics Concern     Not on file   Social History Narrative     Not on file     Social Determinants of Health     Financial Resource Strain: Not on file   Food Insecurity: Not on file   Transportation Needs: Not on file   Physical Activity: Not on file   Stress: Not on file   Social Connections: Not on file   Intimate Partner Violence: Not on file   Housing Stability: Not on file           Medications  Allergies   Current Outpatient Medications   Medication Sig Dispense Refill     aspirin 81 MG EC tablet [ASPIRIN 81 MG EC TABLET] Take 81 mg by mouth daily.       atorvastatin (LIPITOR) 80 MG tablet TAKE 1 TABLET(80 MG) BY MOUTH DAILY 90 tablet 2     ezetimibe (ZETIA) 10 MG tablet Take 1 tablet (10 mg) by mouth daily 90 tablet 3     lisinopril (ZESTRIL) 20 MG tablet Take 1 tablet (20 mg) by mouth daily 90 tablet 1     metoprolol succinate ER (TOPROL XL) 50 MG 24 hr tablet Take 1 tablet (50 mg) by mouth daily 90 tablet 1     multivitamin (CENTRUM SILVER) tablet Take 1 tablet by mouth daily       nitroglycerin (NITROSTAT) 0.4 MG SL tablet [NITROGLYCERIN (NITROSTAT) 0.4 MG SL TABLET] ONE TABLET UNDER TONGUE AS NEEDED FOR CHEST PAIN  MAY REPEAT EVERY 5 MINUTES X 2. IF NO RELIEF DIAL 911 25 tablet 1     nitroGLYcerin (NITROSTAT) 0.4 MG sublingual tablet For chest pain place 1 tablet under the tongue every 5 minutes for 3 doses. If symptoms persist 5 minutes after 1st dose call 911. 30 tablet 3     cholecalciferol, vitamin D3, 1,000 unit tablet [CHOLECALCIFEROL, VITAMIN D3, 1,000 UNIT TABLET] Take 1,000 Units by mouth daily. (Patient not taking: Reported on 8/26/2022)       magnesium oxide 500 mg Tab [MAGNESIUM OXIDE 500 MG TAB] Take 500 mg by mouth daily. (Patient not taking: Reported on 8/26/2022)       vitamin E 400 unit capsule [VITAMIN E 400 UNIT CAPSULE] Take 400 Units by mouth daily. (Patient not taking: Reported on 8/26/2022)       No Known Allergies       Lab Results    Chemistry/lipid CBC Cardiac Enzymes/BNP/TSH/INR   Recent Labs   Lab Test 08/02/22  1135   CHOL 174   HDL 49   *   TRIG 94     Recent Labs   Lab Test 08/02/22  1135 09/20/21  0800 03/09/18  1034   * 102 96     Recent Labs   Lab Test 08/02/22  1135      POTASSIUM 4.8   CHLORIDE 105   CO2 26   GLC 99   BUN 19.6   CR 0.94   GFRESTIMATED >90   CHRISSY 9.5     Recent Labs   Lab Test 08/02/22  1135 09/20/21  0800 03/09/18  1034   CR 0.94 1.03 0.96     No results for input(s): A1C in the last 13269 hours.       Recent Labs   Lab Test 08/02/22  1135   WBC 7.3   HGB 14.2   HCT 42.0   MCV 91        Recent Labs   Lab Test 08/02/22  1135 03/09/18  1034   HGB 14.2 14.3    No results for input(s): TROPONINI in the last 51691 hours.  Recent Labs   Lab Test 03/09/18  1034   *     Recent Labs   Lab Test 08/02/22  1135   TSH 0.86     No results for input(s): INR in the last 80036 hours.     Jadiel Alva DO    Thank you for allowing me to participate in the care of your patient.      Sincerely,     Jadiel Alva DO     [facility]    cc:   Valentino Lu MD  [address]  [place] [address]

## 2022-09-12 ENCOUNTER — OFFICE VISIT (OUTPATIENT)
Dept: CARDIOLOGY | Facility: CLINIC | Age: 62
End: 2022-09-12
Payer: COMMERCIAL

## 2022-09-12 DIAGNOSIS — Z00.6 EXAMINATION OF PARTICIPANT OR CONTROL IN CLINICAL RESEARCH: Primary | ICD-10-CM

## 2022-09-12 LAB — APO A-I SERPL-MCNC: <6 MG/DL

## 2022-09-12 PROCEDURE — 36415 COLL VENOUS BLD VENIPUNCTURE: CPT

## 2022-09-12 PROCEDURE — 83695 ASSAY OF LIPOPROTEIN(A): CPT

## 2022-09-12 PROCEDURE — 99207 PR NO CHARGE-RESEARCH SERVICE: CPT

## 2022-09-12 NOTE — PROGRESS NOTES
[facility]- A multicenter, Cross sectional study to characterize the Distribution of Lipoprotein(a) Levels Among Patients With Documented History of Atherosclerotic Cardiovascular Disease       Minor Murcia was seen in clinic today for the screening visit.    Research nurse met with subject  to discuss consent and participation in the above noted study.    The study discussion included the following:     Study purpose    Qualifications for participation    Length of study participation    Study procedures    Risks and side effects    Benefits (if any)    Voluntary nature of participation    Alternatives to participation    Confidentiality of records    Financial considerations     Subject asked questions and agreed that he received answers that satisfied him.    Consent form [Version 2 - [employer] version 19 Jul 2022] signed on 12 Aug 2022 at 10.30am. Patient verbalized understanding. A signed copy was offered to the subject & forwarded to medical records. No study procedures were done prior to obtaining informed consent.     Did Subject meet all inclusion criteria? Yes  Did Subject meet any Exclusion criteria? No    Lab draw performed without issue at 10.51am. Research staff will follow up with subject with results.      Jens Hernandez RN   Clinical Trials Office   [phone]      Current Outpatient Medications:      aspirin 81 MG EC tablet, [ASPIRIN 81 MG EC TABLET] Take 81 mg by mouth daily., Disp: , Rfl:      atorvastatin (LIPITOR) 80 MG tablet, TAKE 1 TABLET(80 MG) BY MOUTH DAILY, Disp: 90 tablet, Rfl: 2     cholecalciferol, vitamin D3, 1,000 unit tablet, [CHOLECALCIFEROL, VITAMIN D3, 1,000 UNIT TABLET] Take 1,000 Units by mouth daily. (Patient not taking: Reported on 8/26/2022), Disp: , Rfl:      ezetimibe (ZETIA) 10 MG tablet, Take 1 tablet (10 mg) by mouth daily, Disp: 90 tablet, Rfl: 3     lisinopril (ZESTRIL) 20 MG tablet, Take 1 tablet (20 mg) by mouth daily, Disp: 90 tablet, Rfl: 1     magnesium oxide  500 mg Tab, [MAGNESIUM OXIDE 500 MG TAB] Take 500 mg by mouth daily. (Patient not taking: Reported on 8/26/2022), Disp: , Rfl:      metoprolol succinate ER (TOPROL XL) 50 MG 24 hr tablet, Take 1 tablet (50 mg) by mouth daily, Disp: 90 tablet, Rfl: 1     multivitamin (CENTRUM SILVER) tablet, Take 1 tablet by mouth daily, Disp: , Rfl:      nitroglycerin (NITROSTAT) 0.4 MG SL tablet, [NITROGLYCERIN (NITROSTAT) 0.4 MG SL TABLET] ONE TABLET UNDER TONGUE AS NEEDED FOR CHEST PAIN MAY REPEAT EVERY 5 MINUTES X 2. IF NO RELIEF DIAL 911, Disp: 25 tablet, Rfl: 1     nitroGLYcerin (NITROSTAT) 0.4 MG sublingual tablet, For chest pain place 1 tablet under the tongue every 5 minutes for 3 doses. If symptoms persist 5 minutes after 1st dose call 911., Disp: 30 tablet, Rfl: 3     vitamin E 400 unit capsule, [VITAMIN E 400 UNIT CAPSULE] Take 400 Units by mouth daily. (Patient not taking: Reported on 8/26/2022), Disp: , Rfl:   Past Medical History:   Diagnosis Date     Coronary artery disease      High cholesterol      Hypertension      Myocardial infarction (H) 2005     No data found.  Vitals: There were no vitals taken for this visit.  BMI= There is no height or weight on file to calculate BMI.  Choose not to answer  male  62 year old

## 2022-09-12 NOTE — PROGRESS NOTES
[facility]: A multicenter, Cross sectional study to characterize the Distribution of Lipoprotein(a) Levels Among Patients With Documented History of Atherosclerotic Cardiovascular Disease     [facility]Exclusion [facility]    Inclusion Criteria  All must be Yes    101 Subject has provided informed consent prior to initiation of any study specific activities/procedures. Yes   102 Age 18 to 85 years. yes   103 History of ASCVD as demonstrated by either:  MI (presumed type 1)    And/or    b) PCI (with high-risk features) with at least 1 of the following:  Age > 65 years  Diabetes mellitus  History of ischemic stroke  History of peripheral arterial disease  Residual stenosis > 50%  Multivessel PCI (ie, > 2 vessels, including branch arteries) yes          Exclusion Criteria All must   be No   201 Subjects known to be currently receiving investigational drug in a clinical study that is anticipated to last > 1 year. No   202 Known Lp(a) value < 90 mg/dL (if measured in mass) or < 200 nmol/L (if measured in molar). No   203 Subject has a diagnosis of end-stage renal disease or requires dialysis. No   204 Poorly controlled (glycated hemoglobin [HbA1c] > 10%) diabetes mellitus (type 1 or type 2). No   205 Subject is receiving or has received lipoprotein apheresis to reduce Lp(a) within 3 months prior to enrollment. No   206 Known uncontrolled or recurrent ventricular tachycardia in the past 3 months prior to enrollment. No   207 Known malignancy (except non-melanoma skin cancers, cervical in situ carcinoma, breast ductal carcinoma in situ, or stage 1 prostate carcinoma) within the last 5 years prior to enrollment. No   208 Known history or evidence of clinically significant disease (eg, respiratory, gastrointestinal, or psychiatric disease) or unstable disorder or biomarker that, in the opinion of the investigator(s), would result in life expectancy < 5 years. No   209 Known hemorrhagic stroke. No       Participant  has met all inclusion criteria and no exclusion criteria and is ready to be enrolled in the [facility] disease prevalence study.     Dr. Valencia: Agree this patient can move to Enrollment   [x] Yes   [] No    Jens Hernandez RN

## 2022-09-12 NOTE — LETTER
9/12/2022    Valentino Lu MD, MD  [address]  [facility] [address]    RE: Minor Murcia       Dear Colleague,     I had the pleasure of seeing Minor Murcia in the [facility].      [facility]- A multicenter, Cross sectional study to characterize the Distribution of Lipoprotein(a) Levels Among Patients With Documented History of Atherosclerotic Cardiovascular Disease       Minor Murcia was seen in clinic today for the screening visit.    Research nurse met with subject  to discuss consent and participation in the above noted study.    The study discussion included the following:     Study purpose    Qualifications for participation    Length of study participation    Study procedures    Risks and side effects    Benefits (if any)    Voluntary nature of participation    Alternatives to participation    Confidentiality of records    Financial considerations     Subject asked questions and agreed that he received answers that satisfied him.    Consent form [Version 2 - [employer] version 19 Jul 2022] signed on 12 Aug 2022 at 10.30am. Patient verbalized understanding. A signed copy was offered to the subject & forwarded to medical records. No study procedures were done prior to obtaining informed consent.     Did Subject meet all inclusion criteria? Yes  Did Subject meet any Exclusion criteria? No    Lab draw performed without issue at 10.51am. Research staff will follow up with subject with results.      Jens Hernandez RN   Clinical Trials Office   [phone]      Current Outpatient Medications:      aspirin 81 MG EC tablet, [ASPIRIN 81 MG EC TABLET] Take 81 mg by mouth daily., Disp: , Rfl:      atorvastatin (LIPITOR) 80 MG tablet, TAKE 1 TABLET(80 MG) BY MOUTH DAILY, Disp: 90 tablet, Rfl: 2     cholecalciferol, vitamin D3, 1,000 unit tablet, [CHOLECALCIFEROL, VITAMIN D3, 1,000 UNIT TABLET] Take 1,000 Units by mouth daily. (Patient not taking: Reported on 8/26/2022), Disp: , Rfl:      ezetimibe  (ZETIA) 10 MG tablet, Take 1 tablet (10 mg) by mouth daily, Disp: 90 tablet, Rfl: 3     lisinopril (ZESTRIL) 20 MG tablet, Take 1 tablet (20 mg) by mouth daily, Disp: 90 tablet, Rfl: 1     magnesium oxide 500 mg Tab, [MAGNESIUM OXIDE 500 MG TAB] Take 500 mg by mouth daily. (Patient not taking: Reported on 8/26/2022), Disp: , Rfl:      metoprolol succinate ER (TOPROL XL) 50 MG 24 hr tablet, Take 1 tablet (50 mg) by mouth daily, Disp: 90 tablet, Rfl: 1     multivitamin (CENTRUM SILVER) tablet, Take 1 tablet by mouth daily, Disp: , Rfl:      nitroglycerin (NITROSTAT) 0.4 MG SL tablet, [NITROGLYCERIN (NITROSTAT) 0.4 MG SL TABLET] ONE TABLET UNDER TONGUE AS NEEDED FOR CHEST PAIN MAY REPEAT EVERY 5 MINUTES X 2. IF NO RELIEF DIAL 911, Disp: 25 tablet, Rfl: 1     nitroGLYcerin (NITROSTAT) 0.4 MG sublingual tablet, For chest pain place 1 tablet under the tongue every 5 minutes for 3 doses. If symptoms persist 5 minutes after 1st dose call 911., Disp: 30 tablet, Rfl: 3     vitamin E 400 unit capsule, [VITAMIN E 400 UNIT CAPSULE] Take 400 Units by mouth daily. (Patient not taking: Reported on 8/26/2022), Disp: , Rfl:   Past Medical History:   Diagnosis Date     Coronary artery disease      High cholesterol      Hypertension      Myocardial infarction (H) 2005     No data found.  Vitals: There were no vitals taken for this visit.  BMI= There is no height or weight on file to calculate BMI.  Choose not to answer  male  62 year old        [facility]: A multicenter, Cross sectional study to characterize the Distribution of Lipoprotein(a) Levels Among Patients With Documented History of Atherosclerotic Cardiovascular Disease     [facility] Inclusion/Exclusion Criteria    Inclusion Criteria  All must be Yes    101 Subject has provided informed consent prior to initiation of any study specific activities/procedures. Yes   102 Age 18 to 85 years. yes   103 History of ASCVD as demonstrated by either:  a) MI (presumed type  1)    And/or    b) PCI (with high-risk features) with at least 1 of the following:    Age > 65 years    Diabetes mellitus    History of ischemic stroke    History of peripheral arterial disease    Residual stenosis > 50%    Multivessel PCI (ie, > 2 vessels, including branch arteries) yes          Exclusion Criteria All must   be No   201 Subjects known to be currently receiving investigational drug in a clinical study that is anticipated to last > 1 year. No   202 Known Lp(a) value < 90 mg/dL (if measured in mass) or < 200 nmol/L (if measured in molar). No   203 Subject has a diagnosis of end-stage renal disease or requires dialysis. No   204 Poorly controlled (glycated hemoglobin [HbA1c] > 10%) diabetes mellitus (type 1 or type 2). No   205 Subject is receiving or has received lipoprotein apheresis to reduce Lp(a) within 3 months prior to enrollment. No   206 Known uncontrolled or recurrent ventricular tachycardia in the past 3 months prior to enrollment. No   207 Known malignancy (except non-melanoma skin cancers, cervical in situ carcinoma, breast ductal carcinoma in situ, or stage 1 prostate carcinoma) within the last 5 years prior to enrollment. No   208 Known history or evidence of clinically significant disease (eg, respiratory, gastrointestinal, or psychiatric disease) or unstable disorder or biomarker that, in the opinion of the investigator(s), would result in life expectancy < 5 years. No   209 Known hemorrhagic stroke. No       Participant has met all inclusion criteria and no exclusion criteria and is ready to be enrolled in the [facility] disease prevalence study.     Dr. Valencia: Agree this patient can move to Enrollment   [] Yes   [] No    Jens Hernandez RN        Thank you for allowing me to participate in the care of your patient.      Sincerely,     Jens Hernandez RN     [facility]  cc:   No referring provider defined for this encounter.

## 2022-09-14 ENCOUNTER — DOCUMENTATION ONLY (OUTPATIENT)
Dept: CARDIOLOGY | Facility: CLINIC | Age: 62
End: 2022-09-14
Payer: COMMERCIAL

## 2022-09-14 DIAGNOSIS — Z00.6 EXAMINATION OF PARTICIPANT OR CONTROL IN CLINICAL RESEARCH: Primary | ICD-10-CM

## 2022-09-14 PROCEDURE — 99207 PR NO CHARGE-RESEARCH SERVICE: CPT

## 2022-09-14 NOTE — PROGRESS NOTES
[facility]: A multicenter, Cross sectional study to characterize the Distribution of Lipoprotein(a) Levels Among Patients With Documented History of Atherosclerotic Cardiovascular Disease     Minor Murcia was called today to review results of Lp(a) drawn on 12 Sep 2022.    He was informed of the significance of the results as related to upcoming study and their potential participation.    He affirms understanding.    Minor Murcia has no further questions at this time.    Jens Hernandez RN    Recent Results (from the past 240 hour(s))   Lipoprotein (a)    Collection Time: 09/12/22 10:51 AM   Result Value Ref Range    Lipoprotein (a) <6 <30 mg/dL

## 2022-09-26 ENCOUNTER — HOSPITAL ENCOUNTER (OUTPATIENT)
Dept: CARDIOLOGY | Facility: CLINIC | Age: 62
Discharge: HOME | End: 2022-09-26
Attending: INTERNAL MEDICINE | Admitting: INTERNAL MEDICINE
Payer: COMMERCIAL

## 2022-09-26 DIAGNOSIS — R55 NEAR SYNCOPE: ICD-10-CM

## 2022-09-26 DIAGNOSIS — E78.2 MIXED HYPERLIPIDEMIA: ICD-10-CM

## 2022-09-26 DIAGNOSIS — R00.2 RAPID PALPITATIONS: ICD-10-CM

## 2022-09-26 DIAGNOSIS — I25.10 CORONARY ARTERY DISEASE INVOLVING NATIVE HEART WITHOUT ANGINA PECTORIS, UNSPECIFIED VESSEL OR LESION TYPE: ICD-10-CM

## 2022-09-26 PROCEDURE — 93321 DOPPLER ECHO F-UP/LMTD STD: CPT | Mod: 26 | Performed by: INTERNAL MEDICINE

## 2022-09-26 PROCEDURE — 93321 DOPPLER ECHO F-UP/LMTD STD: CPT | Mod: TC

## 2022-09-26 PROCEDURE — 93350 STRESS TTE ONLY: CPT | Mod: TC

## 2022-09-26 PROCEDURE — 93350 STRESS TTE ONLY: CPT | Mod: 26 | Performed by: INTERNAL MEDICINE

## 2022-09-26 PROCEDURE — 93325 DOPPLER ECHO COLOR FLOW MAPG: CPT | Mod: 26 | Performed by: INTERNAL MEDICINE

## 2022-09-26 PROCEDURE — 93018 CV STRESS TEST I&R ONLY: CPT | Performed by: INTERNAL MEDICINE

## 2022-09-26 PROCEDURE — 93325 DOPPLER ECHO COLOR FLOW MAPG: CPT | Mod: TC

## 2022-09-26 PROCEDURE — 93016 CV STRESS TEST SUPVJ ONLY: CPT | Performed by: INTERNAL MEDICINE

## 2022-09-29 ENCOUNTER — OFFICE VISIT (OUTPATIENT)
Dept: SLEEP MEDICINE | Facility: CLINIC | Age: 62
End: 2022-09-29
Payer: COMMERCIAL

## 2022-09-29 VITALS
WEIGHT: 195 LBS | HEART RATE: 51 BPM | BODY MASS INDEX: 27.3 KG/M2 | OXYGEN SATURATION: 98 % | HEIGHT: 71 IN | RESPIRATION RATE: 16 BRPM | DIASTOLIC BLOOD PRESSURE: 74 MMHG | SYSTOLIC BLOOD PRESSURE: 132 MMHG

## 2022-09-29 DIAGNOSIS — G47.31 CENTRAL SLEEP APNEA: Primary | ICD-10-CM

## 2022-09-29 DIAGNOSIS — G47.20 CIRCADIAN DYSREGULATION: ICD-10-CM

## 2022-09-29 DIAGNOSIS — G47.33 OSA (OBSTRUCTIVE SLEEP APNEA): ICD-10-CM

## 2022-09-29 PROCEDURE — 99204 OFFICE O/P NEW MOD 45 MIN: CPT | Performed by: INTERNAL MEDICINE

## 2022-09-29 ASSESSMENT — SLEEP AND FATIGUE QUESTIONNAIRES
HOW LIKELY ARE YOU TO NOD OFF OR FALL ASLEEP WHILE LYING DOWN TO REST IN THE AFTERNOON WHEN CIRCUMSTANCES PERMIT: HIGH CHANCE OF DOZING
HOW LIKELY ARE YOU TO NOD OFF OR FALL ASLEEP WHILE WATCHING TV: HIGH CHANCE OF DOZING
HOW LIKELY ARE YOU TO NOD OFF OR FALL ASLEEP WHILE SITTING AND READING: HIGH CHANCE OF DOZING
HOW LIKELY ARE YOU TO NOD OFF OR FALL ASLEEP WHILE SITTING AND TALKING TO SOMEONE: SLIGHT CHANCE OF DOZING
HOW LIKELY ARE YOU TO NOD OFF OR FALL ASLEEP WHILE SITTING INACTIVE IN A PUBLIC PLACE: HIGH CHANCE OF DOZING
HOW LIKELY ARE YOU TO NOD OFF OR FALL ASLEEP IN A CAR, WHILE STOPPED FOR A FEW MINUTES IN TRAFFIC: WOULD NEVER DOZE
HOW LIKELY ARE YOU TO NOD OFF OR FALL ASLEEP WHILE SITTING QUIETLY AFTER LUNCH WITHOUT ALCOHOL: HIGH CHANCE OF DOZING
HOW LIKELY ARE YOU TO NOD OFF OR FALL ASLEEP WHEN YOU ARE A PASSENGER IN A CAR FOR AN HOUR WITHOUT A BREAK: HIGH CHANCE OF DOZING

## 2022-09-29 NOTE — LETTER
September 29, 2022      Minor Murcia  [address] [place] [id number]        To Whom It May Concern:    Minor Murcia was seen in our clinic. Minor should not be placed on night shift due to his cardiovascular issues and chronic sleep issues. Please make allowance in your scheduling to accommodate his conditions.      Sincerely,        Crescencio Cornell, DO

## 2022-09-29 NOTE — PROGRESS NOTES
Additional 15 minutes on the date of service was spent performing the following:    -Preparing to see the patient  -Obtaining and/or reviewing separately obtained history   -Ordering medications, tests, or procedures   -Documenting clinical information in the electronic or other health record     Thank you for the opportunity to participate in the care of Minor Murcia.     He is a 62 year old y/o male patient who comes to the sleep medicine clinic for follow up.  The home sleep study was completed on 06/01/17 which showed the the patient had severe obstructive sleep apnea with an apnea hypopnea index of 47.8 events per hour with the lowest O2 sat of 80%.  The patient also appeared to have central sleep apnea that was well treated by ASV.  Since the patient's last clinical visit with me for about a year, he has been feeling that his sleep is no longer restorative.  Furthermore he complains of frequent episodes of nocturnal awakening.  Upon further questioning the patient states that his work requires him to work odd hours which is stressful to his body.  He also frequently naps without his ASV.     Assessment and Plan:  In summary Minor Murcia is a 62 year old year old male who is here for follow up.    1. LOUIS (obstructive sleep apnea)/Central sleep apnea  I congratulated the patient on his excellent usage of ASV.  His machine is getting recalled and he is supposed to get a replacement pretty soon.  If he does not get the replacement by the end of the year, I recommend that he message me so I can write him a new prescription to get different ASV.  Continue with current pressure settings for now.  - Adult Sleep Eval & Management  Referral  - COMPREHENSIVE DME    2.  Circadian dysregulation  I informed the patient that his current work schedule may be contributing to his sensation of nonrestorative sleep.  I will write him a letter requesting that his employer avoid giving him night shift.  I will leave it up to  the patient as to whether or not he wishes to submit this letter to his employer.     Compliance Download data for 30 days:  Pressure setting: ASV  Residual AHI: 6.2 events per hour  Leak: Minimal  Compliance: 100%  Mask Tolerance: Good  Skin irritation: None  DME: [facility]    Lab reviewed: Discussed with patient.    Sleep-Wake Cycle:    The patient likes to initiate sleep at around 11-12 at night with a sleep latency of less than  20 minutes. The patient has 2 nocturnal awakenings. Final wake up time is around 8 AM.    AMA:  AMA Total Score: 16  No data recorded      Patient Active Problem List   Diagnosis     Hyperlipidemia     Impaired Fasting Glucose     Arteriosclerotic Cardiovascular Disease (ASCVD)     CAD (coronary artery disease)     Ischemic chest pain (H)     Abnormal stress test     Coronary artery disease involving native coronary artery of native heart, angina presence unspecified       Past Medical History:   Diagnosis Date     Coronary artery disease      High cholesterol      Hypertension      Myocardial infarction (H) 2005       Past Surgical History:   Procedure Laterality Date     CARDIAC CATHETERIZATION       CORONARY STENT PLACEMENT  2005     PA CATH PLACEMENT & NJX CORONARY ART ANGIO IMG S&I N/A 4/14/2017    Procedure: Coronary Angiogram;  Surgeon: Roverto Hollis MD;  Location: [facility];  Service: Cardiology     PA CATH PLMT L HRT & ARTS W/NJX & ANGIO IMG S&I Left 4/14/2017    Procedure: Left Heart Catheterization Without Left Ventriculogram;  Surgeon: Roverto Hollis MD;  Location: [facility];  Service: Cardiology       Current Outpatient Medications   Medication Sig Dispense Refill     aspirin 81 MG EC tablet [ASPIRIN 81 MG EC TABLET] Take 81 mg by mouth daily.       atorvastatin (LIPITOR) 80 MG tablet TAKE 1 TABLET(80 MG) BY MOUTH DAILY 90 tablet 2     ezetimibe (ZETIA) 10 MG tablet Take 1 tablet (10 mg) by mouth daily 90 tablet 3     lisinopril (ZESTRIL) 20  "MG tablet Take 1 tablet (20 mg) by mouth daily 90 tablet 1     metoprolol succinate ER (TOPROL XL) 50 MG 24 hr tablet Take 1 tablet (50 mg) by mouth daily 90 tablet 1     multivitamin (CENTRUM SILVER) tablet Take 1 tablet by mouth daily       nitroglycerin (NITROSTAT) 0.4 MG SL tablet [NITROGLYCERIN (NITROSTAT) 0.4 MG SL TABLET] ONE TABLET UNDER TONGUE AS NEEDED FOR CHEST PAIN MAY REPEAT EVERY 5 MINUTES X 2. IF NO RELIEF DIAL 911 25 tablet 1       No Known Allergies      Physical Exam:  /74   Pulse 51   Resp 16   Ht 1.803 m (5' 11\")   Wt 88.5 kg (195 lb)   SpO2 98%   BMI 27.20 kg/m    BMI:Body mass index is 27.2 kg/m .   GEN: NAD,   Head: Normocephalic.  Psych: normal mood, normal affect    Labs/Studies:      No results found for: PH, PHARTERIAL, PO2, [id number], SAT, PCO2, HCO3, BASEEXCESS, MARILYN, BEB  Lab Results   Component Value Date    TSH 0.86 08/02/2022     Lab Results   Component Value Date    GLC 99 08/02/2022    GLC 91 09/20/2021     Lab Results   Component Value Date    HGB 14.2 08/02/2022    HGB 14.3 03/09/2018     Lab Results   Component Value Date    BUN 19.6 08/02/2022    BUN 18 09/20/2021    CR 0.94 08/02/2022    CR 1.03 09/20/2021     Lab Results   Component Value Date    AST 33 08/02/2022    AST 24 09/20/2021    ALT 33 08/02/2022    ALT 26 09/20/2021    ALKPHOS 72 08/02/2022    ALKPHOS 80 09/20/2021    BILITOTAL 1.0 08/02/2022    BILITOTAL 1.1 (H) 09/20/2021     No results found for: UAMP, UBARB, BENZODIAZEUR, UCANN, UCOC, OPIT, UPCP    Recent Labs   Lab Test 08/02/22  1135 09/20/21  0800    142   POTASSIUM 4.8 4.3   CHLORIDE 105 107   CO2 26 23   ANIONGAP 10 12   GLC 99 91   BUN 19.6 18   CR 0.94 1.03   CHRISSY 9.5 9.6       No results found for: NADEEN    I reviewed the efficacy and compliance report from his device. Data summarized on the HPI and the PAP compliance flow sheet.     Patient verbalized understanding of these issues, agrees with the plan and all questions were answered " today. Patient was given an opportuntity to voice any other symptoms or concerns not listed above. Patient did not have any other symptoms or concerns.      Crescencio Cornell DO  Board Certified in Internal Medicine and Sleep Medicine    (Note created with Dragon voice recognition and unintended spelling errors and word substitutions may occur)     Audio and visual devices were used for this virtual clinic visit with permission from patient.

## 2022-09-29 NOTE — NURSING NOTE
1 year reminder sent to pt via [employer]. Marked to send 1 month before follow up due. AVS printed and given to pt.    DOMINIQUE Antonio

## 2022-10-02 ENCOUNTER — HEALTH MAINTENANCE LETTER (OUTPATIENT)
Age: 62
End: 2022-10-02

## 2023-02-11 ENCOUNTER — HEALTH MAINTENANCE LETTER (OUTPATIENT)
Age: 63
End: 2023-02-11

## 2023-02-16 DIAGNOSIS — E78.5 HYPERLIPIDEMIA, UNSPECIFIED HYPERLIPIDEMIA TYPE: ICD-10-CM

## 2023-02-16 RX ORDER — ATORVASTATIN CALCIUM 80 MG/1
TABLET, FILM COATED ORAL
Qty: 90 TABLET | Refills: 1 | Status: SHIPPED | OUTPATIENT
Start: 2023-02-16 | End: 2023-09-06

## 2023-02-16 NOTE — TELEPHONE ENCOUNTER
"Last Written Prescription Date:  2/14/2022  Last Fill Quantity: 90,  # refills: 2   Last office visit provider:  8/2/2022     Requested Prescriptions   Pending Prescriptions Disp Refills     atorvastatin (LIPITOR) 80 MG tablet [Pharmacy Med Name: ATORVASTATIN 80MG TABLETS] 90 tablet 2     Sig: TAKE 1 TABLET(80 MG) BY MOUTH DAILY       Statins Protocol Passed - 2/16/2023 11:39 AM        Passed - LDL on file in past 12 months     Recent Labs   Lab Test 08/02/22  1135   *             Passed - No abnormal creatine kinase in past 12 months     No lab results found.             Passed - Recent (12 mo) or future (30 days) visit within the authorizing provider's specialty     Patient has had an office visit with the authorizing provider or a provider within the authorizing providers department within the previous 12 mos or has a future within next 30 days. See \"Patient Info\" tab in inbasket, or \"Choose Columns\" in Meds & Orders section of the refill encounter.              Passed - Medication is active on med list        Passed - Patient is age 18 or older             Ariane Stephen RN 02/16/23 12:43 PM  "

## 2023-03-10 DIAGNOSIS — G47.33 OSA (OBSTRUCTIVE SLEEP APNEA): ICD-10-CM

## 2023-03-10 DIAGNOSIS — G47.31 CENTRAL SLEEP APNEA: Primary | ICD-10-CM

## 2023-03-13 ENCOUNTER — OFFICE VISIT (OUTPATIENT)
Dept: FAMILY MEDICINE | Facility: CLINIC | Age: 63
End: 2023-03-13
Payer: COMMERCIAL

## 2023-03-13 ENCOUNTER — ANCILLARY PROCEDURE (OUTPATIENT)
Dept: GENERAL RADIOLOGY | Facility: CLINIC | Age: 63
End: 2023-03-13
Attending: FAMILY MEDICINE
Payer: COMMERCIAL

## 2023-03-13 VITALS
TEMPERATURE: 97.2 F | OXYGEN SATURATION: 98 % | HEIGHT: 71 IN | BODY MASS INDEX: 27.61 KG/M2 | SYSTOLIC BLOOD PRESSURE: 128 MMHG | HEART RATE: 44 BPM | RESPIRATION RATE: 18 BRPM | DIASTOLIC BLOOD PRESSURE: 68 MMHG | WEIGHT: 197.2 LBS

## 2023-03-13 DIAGNOSIS — R52 PAIN: ICD-10-CM

## 2023-03-13 DIAGNOSIS — I25.10 CORONARY ARTERY DISEASE INVOLVING NATIVE HEART WITHOUT ANGINA PECTORIS, UNSPECIFIED VESSEL OR LESION TYPE: ICD-10-CM

## 2023-03-13 DIAGNOSIS — Z00.00 ANNUAL PHYSICAL EXAM: Primary | ICD-10-CM

## 2023-03-13 DIAGNOSIS — R25.2 LEG CRAMPS: ICD-10-CM

## 2023-03-13 DIAGNOSIS — L98.9 SKIN LESION: ICD-10-CM

## 2023-03-13 DIAGNOSIS — M25.551 BILATERAL HIP PAIN: ICD-10-CM

## 2023-03-13 DIAGNOSIS — M25.552 BILATERAL HIP PAIN: ICD-10-CM

## 2023-03-13 LAB
ALBUMIN SERPL BCG-MCNC: 4.5 G/DL (ref 3.5–5.2)
ALP SERPL-CCNC: 79 U/L (ref 40–129)
ALT SERPL W P-5'-P-CCNC: 39 U/L (ref 10–50)
ANION GAP SERPL CALCULATED.3IONS-SCNC: 12 MMOL/L (ref 7–15)
AST SERPL W P-5'-P-CCNC: 41 U/L (ref 10–50)
BILIRUB SERPL-MCNC: 1 MG/DL
BUN SERPL-MCNC: 19.2 MG/DL (ref 8–23)
CALCIUM SERPL-MCNC: 9.8 MG/DL (ref 8.8–10.2)
CHLORIDE SERPL-SCNC: 104 MMOL/L (ref 98–107)
CHOLEST SERPL-MCNC: 140 MG/DL
CREAT SERPL-MCNC: 1.09 MG/DL (ref 0.67–1.17)
DEPRECATED HCO3 PLAS-SCNC: 26 MMOL/L (ref 22–29)
ERYTHROCYTE [DISTWIDTH] IN BLOOD BY AUTOMATED COUNT: 12.4 % (ref 10–15)
GFR SERPL CREATININE-BSD FRML MDRD: 76 ML/MIN/1.73M2
GLUCOSE SERPL-MCNC: 93 MG/DL (ref 70–99)
HCT VFR BLD AUTO: 41.8 % (ref 40–53)
HDLC SERPL-MCNC: 46 MG/DL
HGB BLD-MCNC: 14.2 G/DL (ref 13.3–17.7)
IRON BINDING CAPACITY (ROCHE): 290 UG/DL (ref 240–430)
IRON SATN MFR SERPL: 38 % (ref 15–46)
IRON SERPL-MCNC: 109 UG/DL (ref 61–157)
LDLC SERPL CALC-MCNC: 83 MG/DL
MAGNESIUM SERPL-MCNC: 2.6 MG/DL (ref 1.7–2.3)
MCH RBC QN AUTO: 30.2 PG (ref 26.5–33)
MCHC RBC AUTO-ENTMCNC: 34 G/DL (ref 31.5–36.5)
MCV RBC AUTO: 89 FL (ref 78–100)
NONHDLC SERPL-MCNC: 94 MG/DL
PLATELET # BLD AUTO: 226 10E3/UL (ref 150–450)
POTASSIUM SERPL-SCNC: 5 MMOL/L (ref 3.4–5.3)
PROT SERPL-MCNC: 7.2 G/DL (ref 6.4–8.3)
RBC # BLD AUTO: 4.7 10E6/UL (ref 4.4–5.9)
SODIUM SERPL-SCNC: 142 MMOL/L (ref 136–145)
TRIGL SERPL-MCNC: 55 MG/DL
WBC # BLD AUTO: 6.1 10E3/UL (ref 4–11)

## 2023-03-13 PROCEDURE — 83540 ASSAY OF IRON: CPT | Performed by: FAMILY MEDICINE

## 2023-03-13 PROCEDURE — 36415 COLL VENOUS BLD VENIPUNCTURE: CPT | Performed by: FAMILY MEDICINE

## 2023-03-13 PROCEDURE — 85027 COMPLETE CBC AUTOMATED: CPT | Performed by: FAMILY MEDICINE

## 2023-03-13 PROCEDURE — 80061 LIPID PANEL: CPT | Performed by: FAMILY MEDICINE

## 2023-03-13 PROCEDURE — 80053 COMPREHEN METABOLIC PANEL: CPT | Performed by: FAMILY MEDICINE

## 2023-03-13 PROCEDURE — 99396 PREV VISIT EST AGE 40-64: CPT | Performed by: FAMILY MEDICINE

## 2023-03-13 PROCEDURE — 83735 ASSAY OF MAGNESIUM: CPT | Performed by: FAMILY MEDICINE

## 2023-03-13 PROCEDURE — 73522 X-RAY EXAM HIPS BI 3-4 VIEWS: CPT | Mod: TC | Performed by: RADIOLOGY

## 2023-03-13 PROCEDURE — 99213 OFFICE O/P EST LOW 20 MIN: CPT | Mod: 25 | Performed by: FAMILY MEDICINE

## 2023-03-13 PROCEDURE — 83550 IRON BINDING TEST: CPT | Performed by: FAMILY MEDICINE

## 2023-03-13 RX ORDER — METOPROLOL SUCCINATE 50 MG/1
25 TABLET, EXTENDED RELEASE ORAL DAILY
Qty: 90 TABLET | Refills: 1
Start: 2023-03-13 | End: 2023-05-05

## 2023-03-13 ASSESSMENT — ENCOUNTER SYMPTOMS
FREQUENCY: 0
WEAKNESS: 0
ARTHRALGIAS: 1
HEMATOCHEZIA: 0
SORE THROAT: 0
DIARRHEA: 0
ABDOMINAL PAIN: 0
CHILLS: 0
MYALGIAS: 1
DYSURIA: 0
DIZZINESS: 0
HEARTBURN: 0
COUGH: 0
SHORTNESS OF BREATH: 0
PALPITATIONS: 0
CONSTIPATION: 0
NERVOUS/ANXIOUS: 0
HEADACHES: 0
FEVER: 0
PARESTHESIAS: 0
JOINT SWELLING: 1
HEMATURIA: 0
EYE PAIN: 0
NAUSEA: 0

## 2023-03-13 ASSESSMENT — PAIN SCALES - GENERAL: PAINLEVEL: MILD PAIN (2)

## 2023-03-13 NOTE — PROGRESS NOTES
"Minor Murcia  /68   Pulse (!) 44   Temp 97.2  F (36.2  C) (Temporal)   Resp 18   Ht 1.803 m (5' 11\")   Wt 89.4 kg (197 lb 3.2 oz)   SpO2 98%   BMI 27.50 kg/m       Assessment/Plan:                Minor was seen today for physical, arthritis and recheck medication.    Diagnoses and all orders for this visit:    Annual physical exam    Coronary artery disease involving native heart without angina pectoris, unspecified vessel or lesion type  -     Comprehensive metabolic panel (BMP + Alb, Alk Phos, ALT, AST, Total. Bili, TP)  -     CBC with platelets  -     Lipid panel reflex to direct LDL Fasting  -     metoprolol succinate ER (TOPROL XL) 50 MG 24 hr tablet; Take 0.5 tablets (25 mg) by mouth daily    Leg cramps  -     Iron & Iron Binding Capacity  -     Magnesium    Bilateral hip pain  -     XR Pelvis and Hip Bilateral 2 Views; Future    Skin lesion  -     Adult Dermatology Referral; Future         DISCUSSION  Obtain x-rays of both hips, high likelihood based on exam and clinical history that there is also arthritis changes.  Some of the considerations regarding radiation of pain may be consistent with a neuropathic pain such as from spinal stenosis but this seems less likely based on the described clinical history and exam findings today.  Continue cautious use of acetaminophen and/or OTC NSAIDs see discussion below.  Consider referral to orthopedic specialty provider versus other work-up as indicated by imaging today.    Obtain labs as above.    Referral to dermatology.    Continue to forego prostate cancer screening touched on briefly today.  Subjective:     HPI:    Minor Murcia is a 63 year old male is here today for physical.    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    His son has just completed medical school at [email or web address] in [place] and plans to enter into anesthesiology.  Today he finds out if he matches.    Overall he reports he is struggling " with bilateral knee pain for which he is seen an orthopedic specialist long-term.  He knows he needs knee replacements but is putting this off.    More recently has had more significant pain involving both hips with radiation down the legs.  Discussed evaluation of this today.  He had tried some gabapentin that was left over at 1 point which may have helped with pain.  Intermittently uses OTC NSAIDs or acetaminophen with some relief.  Does not need medication every day.  Discussed the potential dangers of NSAIDs including gastrointestinal, kidney and cardiac.    Reviewed his cardiac situation with history of ASCVD.  Had seen cardiology this past year.  Has more significant bradycardia, reports fatigue pulses in the 40 range more consistently recently.  Discussed reduction in dose of metoprolol today.  No other concerning symptoms elicited at this point.  1 episode of feeling a bit lightheaded at work 1 day.    Does have obstructive sleep apnea saw his sleep specialist.  Continues to use CPAP without concern.    Has a multitude of dark moles and a skin lesion on the right knee which has been present long-term.  With his dark moles there is 1 on the anterior right side upper chest that may have changed it is larger and has a difference in pigmentation.  I do think he should see dermatology for assessment of all of his moles but this 1 in particular.  I think the lesion on his knee is a seborrheic keratosis.    Discussed other aspects of routine health prevention.  We need to find his last colonoscopy report I do believe he is due in 2014 but we do not have a report to review to confirm.  He is up-to-date with immunizations.  Discussed obtaining lab testing today.    ROS:  Complete review of systems is obtained.  Other than the specific considerations noted above complete review of systems is negative.      Objective:   Medications:  Current Outpatient Medications   Medication     aspirin 81 MG EC tablet      atorvastatin (LIPITOR) 80 MG tablet     ezetimibe (ZETIA) 10 MG tablet     lisinopril (ZESTRIL) 20 MG tablet     metoprolol succinate ER (TOPROL XL) 50 MG 24 hr tablet     multivitamin (CENTRUM SILVER) tablet     nitroglycerin (NITROSTAT) 0.4 MG SL tablet     No current facility-administered medications for this visit.        Allergies:   No Known Allergies     Social History     Socioeconomic History     Marital status: Single     Spouse name: Not on file     Number of children: Not on file     Years of education: Not on file     Highest education level: Not on file   Occupational History     Not on file   Tobacco Use     Smoking status: Never     Smokeless tobacco: Never   Vaping Use     Vaping Use: Never used   Substance and Sexual Activity     Alcohol use: Yes     Alcohol/week: 1.0 - 2.0 standard drink     Drug use: No     Sexual activity: Not on file   Other Topics Concern     Not on file   Social History Narrative     Not on file     Social Determinants of Health     Financial Resource Strain: Not on file   Food Insecurity: Not on file   Transportation Needs: Not on file   Physical Activity: Not on file   Stress: Not on file   Social Connections: Not on file   Intimate Partner Violence: Not on file   Housing Stability: Not on file       Family History   Problem Relation Age of Onset     Cancer Mother      Coronary Artery Disease Father         Most Recent Immunizations   Administered Date(s) Administered     COVID-19 Vaccine 12+ (Pfizer) 11/22/2021     COVID-19 Vaccine 18+ (Moderna) 02/11/2021     COVID-19 Vaccine Bivalent Booster 12+ (Pfizer) 12/07/2022     DT (PEDS <7y) 07/16/2003     FLU 6-35 months 09/12/2015     Flu, Unspecified 09/21/2020     HepA, Unspecified 07/16/2003     HepA-Adult 07/16/2003     Influenza (H1N1) 12/18/2009     Influenza (IIV3) PF 09/03/2013     Influenza Vaccine 65+ (Fluzone HD) 08/17/2020     Influenza Vaccine >6 months (Alfuria,Fluzone) 12/07/2022     Influenza Vaccine, 6+MO IM  "(QUADRIVALENT W/PRESERVATIVES) 11/22/2021     TD (ADULT, 7+) 09/20/2021     Tdap (Adacel,Boostrix) 05/20/2011     Zoster vaccine recombinant adjuvanted (SHINGRIX) 09/13/2018        Wt Readings from Last 3 Encounters:   03/13/23 89.4 kg (197 lb 3.2 oz)   09/29/22 88.5 kg (195 lb)   08/26/22 85.7 kg (189 lb)        BP Readings from Last 6 Encounters:   03/13/23 128/68   09/29/22 132/74   08/26/22 126/70   08/02/22 134/78   09/20/21 126/76   01/16/20 (!) 140/72        No results found for: A1C, HEMOGLOBINA1           PHYSICAL EXAM:    /68   Pulse (!) 44   Temp 97.2  F (36.2  C) (Temporal)   Resp 18   Ht 1.803 m (5' 11\")   Wt 89.4 kg (197 lb 3.2 oz)   SpO2 98%   BMI 27.50 kg/m           General Appearance:    Alert, cooperative, no distress   Eyes:   No scleral icterus or conjunctival irritation       Ears:    Normal TM's and external ear canals, both ears   Throat:   Lips, mucosa, and tongue normal; teeth and gums normal   Neck:   Supple, symmetrical, trachea midline, no adenopathy;        thyroid:  No enlargement/tenderness/nodules   Lungs:     Clear to auscultation bilaterally, respirations unlabored, no wheezes or crackles   Heart:    Regular rate and rhythm,  No murmur   Abdomen:    Soft, no distention, no tenderness on palpation, no masses, no organomegaly     Extremities:  No edema, no joint swelling or redness, no evidence of any injuries   Skin:  There is what appears to be a seborrheic keratosis on the right patellar area.  He has a multitude of darkly pigmented moles including 1 on the right upper chest which is about the size of a dime with some difference in pigmentation.  He reports this lesion has been there long-term but may have changed recently.  Based on that report and its overall characteristics along with a multitude of dark moles I think he should see dermatology.     Neurologic:  On gross examination there is no motor or sensory deficit.  Reflexes barely elicitable in lower " extremities, sensation intact with the monofilament line in the tips of the toes.  He walks with a cautious gait showing some discomfort but not obviously favoring one leg or the other.                                       Answers for HPI/ROS submitted by the patient on 3/13/2023  Frequency of exercise:: None  Getting at least 3 servings of Calcium per day:: NO  Diet:: Regular (no restrictions)  Taking medications regularly:: Yes  Medication side effects:: None  Bi-annual eye exam:: NO  Dental care twice a year:: Yes  Sleep apnea or symptoms of sleep apnea:: Sleep apnea  abdominal pain: No  Blood in stool: No  Blood in urine: No  chest pain: No  chills: No  congestion: No  constipation: No  cough: No  diarrhea: No  dizziness: No  ear pain: No  eye pain: No  nervous/anxious: No  fever: No  frequency: No  genital sores: No  headaches: No  hearing loss: No  heartburn: No  arthralgias: Yes  joint swelling: Yes  peripheral edema: No  mood changes: No  myalgias: Yes  nausea: No  dysuria: No  palpitations: No  Skin sensation changes: No  sore throat: No  urgency: No  rash: No  shortness of breath: No  visual disturbance: No  weakness: No  impotence: No  penile discharge: No

## 2023-03-15 ENCOUNTER — TRANSFERRED RECORDS (OUTPATIENT)
Dept: HEALTH INFORMATION MANAGEMENT | Facility: CLINIC | Age: 63
End: 2023-03-15
Payer: COMMERCIAL

## 2023-05-04 ENCOUNTER — TRANSFERRED RECORDS (OUTPATIENT)
Dept: HEALTH INFORMATION MANAGEMENT | Facility: CLINIC | Age: 63
End: 2023-05-04
Payer: COMMERCIAL

## 2023-05-05 DIAGNOSIS — I25.10 CORONARY ARTERY DISEASE INVOLVING NATIVE HEART WITHOUT ANGINA PECTORIS, UNSPECIFIED VESSEL OR LESION TYPE: ICD-10-CM

## 2023-05-05 RX ORDER — METOPROLOL SUCCINATE 50 MG/1
25 TABLET, EXTENDED RELEASE ORAL DAILY
Qty: 45 TABLET | Refills: 3 | Status: SHIPPED | OUTPATIENT
Start: 2023-05-05 | End: 2024-04-22

## 2023-05-05 NOTE — TELEPHONE ENCOUNTER
"Outpatient Medication Detail     Disp Refills Start End AGATA   metoprolol succinate ER (TOPROL XL) 50 MG 24 hr tablet 90 tablet 1 3/13/2023  No   Sig - Route: Take 0.5 tablets (25 mg) by mouth daily - Oral   Class: No Print Out   Order: [id number]     CLASS:  No print.  Routing to provider per protocol.    Routing refill request to provider for review/approval because:  A break in medication    Last Written Prescription Date:  7/21/22  Last Fill Quantity: 90,  # refills: 1   Last office visit provider:  3/13/23     Requested Prescriptions   Pending Prescriptions Disp Refills     metoprolol succinate ER (TOPROL XL) 50 MG 24 hr tablet [Pharmacy Med Name: METOPROLOL ER SUCCINATE 50MG TABS] 90 tablet 1     Sig: TAKE 1 TABLET(50 MG) BY MOUTH DAILY       Beta-Blockers Protocol Passed - 5/5/2023  4:00 PM        Passed - Blood pressure under 140/90 in past 12 months     BP Readings from Last 3 Encounters:   03/13/23 128/68   09/29/22 132/74   08/26/22 126/70                 Passed - Patient is age 6 or older        Passed - Recent (12 mo) or future (30 days) visit within the authorizing provider's specialty     Patient has had an office visit with the authorizing provider or a provider within the authorizing providers department within the previous 12 mos or has a future within next 30 days. See \"Patient Info\" tab in inbasket, or \"Choose Columns\" in Meds & Orders section of the refill encounter.              Passed - Medication is active on med list             Avi Farmer RN 05/05/23 4:02 PM  "

## 2023-05-09 ENCOUNTER — TRANSFERRED RECORDS (OUTPATIENT)
Dept: HEALTH INFORMATION MANAGEMENT | Facility: CLINIC | Age: 63
End: 2023-05-09
Payer: COMMERCIAL

## 2023-06-01 ENCOUNTER — OFFICE VISIT (OUTPATIENT)
Dept: FAMILY MEDICINE | Facility: CLINIC | Age: 63
End: 2023-06-01
Payer: COMMERCIAL

## 2023-06-01 VITALS
HEART RATE: 52 BPM | TEMPERATURE: 98.1 F | BODY MASS INDEX: 26.5 KG/M2 | OXYGEN SATURATION: 98 % | SYSTOLIC BLOOD PRESSURE: 118 MMHG | DIASTOLIC BLOOD PRESSURE: 73 MMHG | RESPIRATION RATE: 14 BRPM | WEIGHT: 190 LBS

## 2023-06-01 DIAGNOSIS — S80.861A TICK BITE OF RIGHT LOWER LEG, INITIAL ENCOUNTER: Primary | ICD-10-CM
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
## 2023-06-01 LAB
BASOPHILS # BLD AUTO: 0 10E3/UL (ref 0–0.2)
BASOPHILS NFR BLD AUTO: 0 %
EOSINOPHIL # BLD AUTO: 0.2 10E3/UL (ref 0–0.7)
EOSINOPHIL NFR BLD AUTO: 3 %
ERYTHROCYTE [DISTWIDTH] IN BLOOD BY AUTOMATED COUNT: 12.3 % (ref 10–15)
HCT VFR BLD AUTO: 43.5 % (ref 40–53)
HGB BLD-MCNC: 14.4 G/DL (ref 13.3–17.7)
IMM GRANULOCYTES # BLD: 0 10E3/UL
IMM GRANULOCYTES NFR BLD: 0 %
LYMPHOCYTES # BLD AUTO: 1.6 10E3/UL (ref 0.8–5.3)
LYMPHOCYTES NFR BLD AUTO: 24 %
MCH RBC QN AUTO: 30.6 PG (ref 26.5–33)
MCHC RBC AUTO-ENTMCNC: 33.1 G/DL (ref 31.5–36.5)
MCV RBC AUTO: 92 FL (ref 78–100)
MONOCYTES # BLD AUTO: 0.7 10E3/UL (ref 0–1.3)
MONOCYTES NFR BLD AUTO: 10 %
NEUTROPHILS # BLD AUTO: 4.1 10E3/UL (ref 1.6–8.3)
NEUTROPHILS NFR BLD AUTO: 62 %
PLATELET # BLD AUTO: 195 10E3/UL (ref 150–450)
RBC # BLD AUTO: 4.71 10E6/UL (ref 4.4–5.9)
WBC # BLD AUTO: 6.6 10E3/UL (ref 4–11)

## 2023-06-01 PROCEDURE — 36415 COLL VENOUS BLD VENIPUNCTURE: CPT | Performed by: PHYSICIAN ASSISTANT

## 2023-06-01 PROCEDURE — 85025 COMPLETE CBC W/AUTO DIFF WBC: CPT | Performed by: PHYSICIAN ASSISTANT

## 2023-06-01 PROCEDURE — 99214 OFFICE O/P EST MOD 30 MIN: CPT | Performed by: PHYSICIAN ASSISTANT

## 2023-06-01 PROCEDURE — 86618 LYME DISEASE ANTIBODY: CPT | Performed by: PHYSICIAN ASSISTANT

## 2023-06-01 PROCEDURE — 86617 LYME DISEASE ANTIBODY: CPT | Performed by: PHYSICIAN ASSISTANT

## 2023-06-01 NOTE — PROGRESS NOTES
Patient presents with:  Leg Pain: Has bruise on rt calf with ring around area not sure if bug bite      Clinical Decision Making:  Testing for Lyme disease with CBC and Lyme disease testing.  Patient will be contacted with test results.  Advised the patient that because he does not have a known tick bite and the rash does not have central clearing consistent with erythema migrans, patient will watch for the test results for appropriate treatment or follow-up with primary care provider in one week to recheck difficulty and symptoms. Currently, he does not have stage I Lyme disease symptoms.        ICD-10-CM    1. Tick bite of right lower leg, initial encounter  S80.861A CBC with platelets and differential    W57.XXXA Lyme Disease Total Abs Bld with Reflex to Confirm CLIA          Patient Instructions     Monitor for stage I Lyme disease symptoms over the next 3 to 30 days.  If you develop stage I Lyme disease symptoms return to your primary care provider for reevaluation treatment or return to the walk-in care if you are unable to get an appointment with your primary care provider.  Take the medication as written.  This is a one-time dose for Lyme disease prophylactic treatment.  Follow suggestions in the handouts below for tick bites.  If the Lyme disease test was drawn for you, you will have follow-up next week with your primary care provider for reevaluation and interpretation of the laboratory value and discussion of the the treatment plan going forward after the lab results are returned.      Patient Education  Tick Bites  Ticks are small arachnids that feed on the blood of rodents, rabbits, birds, deer, dogs, and people. A tick bite may cause a reaction like a spider bite. You may have redness, itching, and slight swelling at the site. Sometimes you may have no reaction where the tick bit you.  Ticks may gorge themselves for days before you find and remove them. The bites themselves aren't cause for concern.  "But ticks can carry and pass on illnesses such as Lyme disease and [place] spotted fever. Both diseases begin with a rash and symptoms similar to the flu. In advanced stages, these diseases can be quite serious.     A \"bull's eye\" rash is a common symptom of Lyme disease.   When to go to the emergency room (ER)  Not all ticks carry disease. And a tick must stay attached for at least 24 hours to infect you. If you find a tick, don't panic. Try to carefully remove it with tweezers. Grasp the tick near its head and pull without twisting. If you can't easily dislodge the tick or if you leave the head in your skin, get medical care right away.  What to expect in the ER    The tick or any parts of the tick will be removed and the bite will be cleaned.    To prevent disease, you may be given antibiotics. Both Lyme disease and [place] spotted fever respond quickly to these medicines.    You may be asked to see your healthcare provider for a blood test to check for Lyme disease.  Follow-up care  Some states and [facility] have services that test ticks for Lyme disease and other diseases. Check with your local officials to see if this service is available in your area.  If you remove a tick yourself, watch for signs of a tick-borne illness. Symptoms may show up within a few days or weeks after a bite. Call your healthcare provider if you notice any of the following:    Rash. The rash may spread outward in a ring from a hard white lump. Or it may move up your arms and legs to your chest.    Chills and fever    Body aches and joint pain    Severe headache  Date Last Reviewed: 12/1/2016 2000-2017 [employer]. [address] [place] [id number]. All rights reserved. This information is not intended as a substitute for professional medical care. Always follow your healthcare professional's instructions.         Patient Education  Preventing Lyme Disease  Ticks are small spider-like arachnids " that feed on the blood of animals and humans. They can carry the bacteria that cause Lyme disease. The bacteria can pass to a person from a tick bite. (It is not passed from person to person.) Lyme disease can lead to serious health problems if it is not treated with antibiotics. The best way to prevent Lyme disease is to avoid being bitten by a tick.     The tick that carries Lyme disease is very small--about the size of a poppy seed.   Preventing tick bites  The disease is carried by the blacklegged tick, also called a  deer tick.  Young ticks called nymphs are the most likely to carry the bacteria. They are very small--about the size of a poppy seed. They can be found on deer, rodents, and birds. Often the animal brushes the tick onto leaves or other plants as it runs through the woods and then the tick lives in bushes, grasses, and dead leaves. The most active time of year for infected ticks varies by region of the country. In the East and [place], they are more active from April to September. In the West, they may be more active from December to February. But you can still be bitten at other times of the year. Below are tips for protecting yourself from tick bites.  Protect your body    Wear clothes that protect you. Clothing can help protect you from tick bites. Wear long pants and long sleeves in outdoor areas where ticks may live. Tuck your shirt into your pants. Tuck pant legs into your socks. And wear light colors so you can more easily see ticks on your clothes.    Use insect repellent. Spray insect repellent containing at least 20 percent DEET on your exposed skin. You can also use it on clothing, shoes, and camping gear. Avoid getting DEET on children s hands, mouth, or eyes. You can use products with permethrin on clothing, shoes, and camping gear. But do not spray permethrin on skin. It will cause a rash. Follow the directions on the package of the spray you use. For more information on bug sprays,  visit the National Pesticide Information Center at http://npic.[facility].    Avoid tick-infested areas. Avoid brushing against grasses, bushes, and other plants. Avoid walking through dead leaves and other ground vegetation. Do not sit on fallen logs. And avoid areas with large numbers of deer and rodents.    Check yourself for ticks. After being outdoors, check your clothes and skin for ticks. Keep in mind that the ticks are about the size of a poppy seed. Use both a hand-held and a full-length mirror to view all of your skin. Pay special attention to areas with hair. Make sure to thoroughly check these areas:  ? Scalp  ? Behind the ears  ? Armpits  ? Belly button  ? Waist  ? Groin  ? Backs of the knees    Use the clothes dryer. Putting clothing or bedding into a clothes dryer for 1 hour at high heat has been shown to kill ticks.  Control ticks around your home    Create tick-free safety zones. Ticks that transmit Lyme disease live in ground vegetation. Ticks crawl onto people from shrubs and grasses in and around wooded areas. Cut bushes and other plants away from the deck or patio and any child play areas. Keep all grasses cut short. Remove dead leaves and other dead vegetation. Do not put children s play equipment near wooded areas. Put wood chips or gravel on the ground between lawns and wooded areas.    Use pesticides. You can apply them yourself or hire a pest control expert. States have different regulations about pesticides. Ask your local health department for information.    Keep deer away. Deer often carry the ticks that can infect you with Lyme disease. Do not attempt to pet or feed deer. Ask your local [employer] center about deer-resistant plants. Ask your local health department about deer control in your area.    Prevent ticks on pets. Pets can bring ticks into your home. Use tick control medicine as advised by your . Check your pet for ticks after it comes indoors. Pay special attention to  the ears.    Ask about local tick-control methods. Some states have tick-control programs. Local health departments may be using methods that can help you control ticks at home.  If you have a tick  If you find a tick on your skin, do not panic. Most ticks don't carry Lyme disease. And the tick must be attached for 36 to 48 hours before it might infect you. If you find a tick on you, here s what to do:    If the tick is not yet attached to your skin, remove the tick with tweezers or a tissue. Flush it down the toilet. If you see a tick on your clothes, use a piece of tape to lift it off. Do not touch it with your bare hands.    If the tick is attached to your skin:  ? Carefully remove the tick with tweezers. If you don t have tweezers, use your fingers protected by a paper towel or a thin cloth. Grasp the tick as close to your skin as possible. Pull slowly and gently to remove the tick. The tick may not let go right away. Do not pull harder. Be patient and keep trying gently. Do not twist the head or body as you pull. Do not crush or squeeze the body. This can release the bacteria into your body. Never use a hot match, petroleum jelly, or other products to remove a tick. (If you can t remove the tick or if part of the tick remains in the skin, call your healthcare provider right away.)  ? Wash your skin with soap and water after you remove the tick. This will help ensure you remove any bacteria.  ? If you can, save the tick in a tightly sealed glass or plastic container. Take it to your healthcare provider. He or she may be able to have someone identify if it is the type of tick that transmits Lyme.  ? Call your healthcare provider and describe the bite and the tick. You may be asked to come in for an exam. You may be tested for Lyme. You may also be prescribed antibiotics to help prevent infection.  ? Over the next month, watch for the symptoms below, especially a rash at the site of the bite.  Symptoms of Lyme  disease  Call your healthcare provider if you develop any symptoms of Lyme disease, even if you don t remember being bitten. These include:    A round, red rash (called a bull s-eye rash)    Fever and chills    Tiredness or body aches    Headache or a stiff neck    Joint pain and swelling (arthritis), especially in large joints such as the knees   To learn more    Centers for Disease Control and Prevention: www.cdc.gov/lyme    American Lyme Disease Foundation: www.aldf.com  Date Last Reviewed: 11/1/2016 2000-2017 [employer]. [address] [place] [address]. All rights reserved. This information is not intended as a substitute for professional medical care. Always follow your healthcare professional's instructions.         Patient Education  Tick Bite (No Antibiotics)    You have been bitten by a tick. Ticks are small arachnids that feed on the blood of rodents, rabbits, birds, deer, dogs, and people. A tick bite may cause a reaction like a spider bite. You may have redness, itching, and slight swelling at the site. Sometimes you may have no reaction where the tick bit you.  Most tick bites are harmless. But some ticks carry diseases, such as Lyme disease or [place] spotted fever. These can be passed to people at the time of the bite. Lyme disease is of greatest concern. Right now you have no symptoms of Lyme disease or other serious reaction to the bite. It is important to watch for the warning signs, which could appear weeks to months after the tick bite.  Home care  The following will help you care for your bite at home:    If itching is a problem, avoid tight clothing and anything that heats up your skin. This includes hot showers or baths and direct sunlight. This will tend to make the itching worse.    An ice pack will reduce local areas of redness and itching. Make your own ice pack by putting ice cubes in a zip-top plastic bag and wrapping it in a thin towel. Creams  containing benzocaine will reduce itching. These creams are available over the counter.    You can use an antihistamine with diphenhydramine if your doctor did not give you another antihistamine. This medicine may be used to reduce itching if large areas of the skin are involved. It is available at drugstores and groceries. If symptoms continue, talk with your doctor or pharmacist about over-the-counter medicines.  Follow-up care  Follow up with your healthcare provider, or as advised.  When to seek medical advice  Call your healthcare provider right away if any of these occur:  Signs of local infection. Watch for these during the next few days.    Increasing redness around the bite site    Increased pain or swelling    Fever over 100.4 F (38.0 C), or as directed by your healthcare provider    Fluid draining from the bite area  Signs of tick-related disease. Watch for these during the next few weeks to months.    Circular, red, ring-like rash appears at the bite area within 1 to 3 weeks    A non-itchy red rash develops on your wrists or ankles and spreads. It may become purple (petechiae).    Red eyes    Tiredness, fever or chills, nausea or vomiting    Neck pain or stiffness, headache, or confusion    Muscle or bone aches    Irregular or rapid heartbeat    Joint pain or swelling, especially in the knee    Numbness, tingling, or weakness in the arms or legs    Weakness on one side of the face  Date Last Reviewed: 10/1/2016    1873-5080 [employer]. [address] [place] [address]. All rights reserved. This information is not intended as a substitute for professional medical care. Always follow your healthcare professional's instructions.         Patient Education  Tick Facts    Ticks are small arachnids that feed on the blood of rodents, rabbits, birds, deer, dogs, and humans. Ticks do not fly and do not drop from trees. They climb to the tips of plants along trails and attach themselves to  you as you brush against the plant. Ticks may also attach to you if you come in contact with an infested animal.  Avoiding ticks  The following tips will help you avoid ticks:    When walking in tick-infested areas, tuck your pants into your boots or socks, and tuck your shirt into your pants.    Wear light-colored clothing so you can easily see any ticks that get on you.    Apply a tick repellent to pants, socks, and shoes.    Avoid brushing against the plants along trails.    Check yourself and your children at the end of each day when hiking through tick-infested areas. Don't forget to check in your hair as well.  Removing ticks  Removing ticks right away will reduce the chance of disease. Here are some tips for removing ticks:    If possible, have someone else remove the tick from you.    Protect your hands from exposure to the tick by using a tissue or rubber glove.    Ticks have hook-like barbs on their mouth, which they use to attach themselves. Use tweezers or small needle-nose pliers instead of your fingers when removing a tick. Grasp the head as close to the skin as possible. Be careful not to squeeze the body, which would inject more fluid from the tick into your skin.    Pull gently and slowly away from skin until the mouthparts let go. If the tick fails to let go, stop pulling. While holding the head with tweezers, slowly turn it 90 degrees. Then, gently pull away from the skin again. This movement may unlock the tick's jaw and make it easier to remove.    Once you have removed the tick, look closely at the bite area. If you think there are still parts of the tick in your skin that you can't remove, contact your doctor.    Wash your hands and the bite site with soap and water.  Do not:    Crush or squeeze the tick with the tweezers.    Jerk the tick.    Burn or prick the tick.    Try to suffocate the tick with petroleum jelly or nail polish.  Identifying ticks  Most tick bites are harmless. But some  ticks carry diseases, such as Lyme disease and [place] spotted fever. These can spread to people. Lyme disease is of greatest concern. It is carried by only one type of tick, the deer tick. Many public health departments are able to identify a tick to figure out if it is the type that causes Lyme disease. If this service is available in your area, bring the removed tick in a zip-top bag or jar to the public health department for identification. If you cannot identify the tick and if you were bitten in an area of the country where there is a risk of Lyme disease, contact your doctor.  Date Last Reviewed: 9/1/2016 2000-2017 [employer]. [address] [place] [address], [place] [address]. All rights reserved. This information is not intended as a substitute for professional medical care. Always follow your healthcare professional's instructions.               HPI:  Minor Murcia is a 63 year old male who presents today for 2-day history of insect bite to the right calf.  Patient believes with a tick bite.  No other rash anywhere else on the body.  He has not had stage I Lyme disease symptoms to include headache myalgias arthralgias fever or stiff neck.  No prior history of Lyme disease.    History obtained from chart review and the patient.    Problem List:  Hyperlipidemia  Impaired Fasting Glucose  Arteriosclerotic Cardiovascular Disease (ASCVD)  CAD (coronary artery disease)  Ischemic chest pain (H)  Abnormal stress test  Coronary artery disease involving native coronary artery of native   heart, angina presence unspecified  Bright Red Blood Per Rectum      Past Medical History:   Diagnosis Date     Coronary artery disease      High cholesterol      Hypertension      Myocardial infarction (H) 2005       Social History     Tobacco Use     Smoking status: Never     Smokeless tobacco: Never   Vaping Use     Vaping status: Never Used   Substance Use Topics     Alcohol use: Yes     Alcohol/week: 1.0 - 2.0  standard drink of alcohol       Review of Systems  As above in HPI otherwise negative.    Vitals:    06/01/23 1121   BP: 118/73   Pulse: 52   Resp: 14   Temp: 98.1  F (36.7  C)   TempSrc: Oral   SpO2: 98%   Weight: 86.2 kg (190 lb)       General: Patient is resting comfortably no acute distress is afebrile  HEENT: Head is normocephalic atraumatic   eyes are PERRL EOMI sclera anicteric   TMs are clear bilaterally  Throat is with mild pharyngeal wall erythema and no exudate  No cervical lymphadenopathy present  LUNGS: Clear to auscultation bilaterally  HEART: Regular rate and rhythm  Skin: With an area of ecchymoses and redness around the area of the posterior calf.  No noted central clearing.    Physical Exam      Labs:  Results for orders placed or performed in visit on 06/01/23   CBC with platelets and differential     Status: None   Result Value Ref Range    WBC Count 6.6 4.0 - 11.0 10e3/uL    RBC Count 4.71 4.40 - 5.90 10e6/uL    Hemoglobin 14.4 13.3 - 17.7 g/dL    Hematocrit 43.5 40.0 - 53.0 %    MCV 92 78 - 100 fL    MCH 30.6 26.5 - 33.0 pg    MCHC 33.1 31.5 - 36.5 g/dL    RDW 12.3 10.0 - 15.0 %    Platelet Count 195 150 - 450 10e3/uL    % Neutrophils 62 %    % Lymphocytes 24 %    % Monocytes 10 %    % Eosinophils 3 %    % Basophils 0 %    % Immature Granulocytes 0 %    Absolute Neutrophils 4.1 1.6 - 8.3 10e3/uL    Absolute Lymphocytes 1.6 0.8 - 5.3 10e3/uL    Absolute Monocytes 0.7 0.0 - 1.3 10e3/uL    Absolute Eosinophils 0.2 0.0 - 0.7 10e3/uL    Absolute Basophils 0.0 0.0 - 0.2 10e3/uL    Absolute Immature Granulocytes 0.0 <=0.4 10e3/uL   CBC with platelets and differential     Status: None    Narrative    The following orders were created for panel order CBC with platelets and differential.  Procedure                               Abnormality         Status                     ---------                               -----------         ------                     CBC with platelets and d...[[id number]]                       Final result                 Please view results for these tests on the individual orders.     Lyme disease screening test is pending at time of documentation.    At the end of the encounter, I discussed results, diagnosis, medications. Discussed red flags for immediate return to clinic/ER, as well as indications for follow up if no improvement. Patient understood and agreed to plan. Patient was stable for discharge.

## 2023-06-01 NOTE — PATIENT INSTRUCTIONS
"Monitor for stage I Lyme disease symptoms over the next 3 to 30 days.  If you develop stage I Lyme disease symptoms return to your primary care provider for reevaluation treatment or return to the walk-in care if you are unable to get an appointment with your primary care provider.  Take the medication as written.  This is a one-time dose for Lyme disease prophylactic treatment.  Follow suggestions in the handouts below for tick bites.  If the Lyme disease test was drawn for you, you will have follow-up next week with your primary care provider for reevaluation and interpretation of the laboratory value and discussion of the the treatment plan going forward after the lab results are returned.      Patient Education   Tick Bites  Ticks are small arachnids that feed on the blood of rodents, rabbits, birds, deer, dogs, and people. A tick bite may cause a reaction like a spider bite. You may have redness, itching, and slight swelling at the site. Sometimes you may have no reaction where the tick bit you.  Ticks may gorge themselves for days before you find and remove them. The bites themselves aren't cause for concern. But ticks can carry and pass on illnesses such as Lyme disease and Jason [place] spotted fever. Both diseases begin with a rash and symptoms similar to the flu. In advanced stages, these diseases can be quite serious.     A \"bull's eye\" rash is a common symptom of Lyme disease.   When to go to the emergency room (ER)  Not all ticks carry disease. And a tick must stay attached for at least 24 hours to infect you. If you find a tick, don't panic. Try to carefully remove it with tweezers. Grasp the tick near its head and pull without twisting. If you can't easily dislodge the tick or if you leave the head in your skin, get medical care right away.  What to expect in the ER  The tick or any parts of the tick will be removed and the bite will be cleaned.  To prevent disease, you may be given antibiotics. Both " Lyme disease and Jason [place] spotted fever respond quickly to these medicines.  You may be asked to see your healthcare provider for a blood test to check for Lyme disease.  Follow-up care  Some states and counties have services that test ticks for Lyme disease and other diseases. Check with your local officials to see if this service is available in your area.  If you remove a tick yourself, watch for signs of a tick-borne illness. Symptoms may show up within a few days or weeks after a bite. Call your healthcare provider if you notice any of the following:  Rash. The rash may spread outward in a ring from a hard white lump. Or it may move up your arms and legs to your chest.  Chills and fever  Body aches and joint pain  Severe headache  Date Last Reviewed: 12/1/2016 2000-2017 [employer]. [address] [place], [place] [id number]. All rights reserved. This information is not intended as a substitute for professional medical care. Always follow your healthcare professional's instructions.         Patient Education   Preventing Lyme Disease  Ticks are small spider-like arachnids that feed on the blood of animals and humans. They can carry the bacteria that cause Lyme disease. The bacteria can pass to a person from a tick bite. (It is not passed from person to person.) Lyme disease can lead to serious health problems if it is not treated with antibiotics. The best way to prevent Lyme disease is to avoid being bitten by a tick.     The tick that carries Lyme disease is very small--about the size of a poppy seed.   Preventing tick bites  The disease is carried by the blacklegged tick, also called a  deer tick.  Young ticks called nymphs are the most likely to carry the bacteria. They are very small--about the size of a poppy seed. They can be found on deer, rodents, and birds. Often the animal brushes the tick onto leaves or other plants as it runs through the woods and then the tick lives in  bushes, grasses, and dead leaves. The most active time of year for infected ticks varies by region of the country. In the East and [place], they are more active from April to September. In the West, they may be more active from December to February. But you can still be bitten at other times of the year. Below are tips for protecting yourself from tick bites.  Protect your body  Wear clothes that protect you. Clothing can help protect you from tick bites. Wear long pants and long sleeves in outdoor areas where ticks may live. Tuck your shirt into your pants. Tuck pant legs into your socks. And wear light colors so you can more easily see ticks on your clothes.  Use insect repellent. Spray insect repellent containing at least 20 percent DEET on your exposed skin. You can also use it on clothing, shoes, and camping gear. Avoid getting DEET on children s hands, mouth, or eyes. You can use products with permethrin on clothing, shoes, and camping gear. But do not spray permethrin on skin. It will cause a rash. Follow the directions on the package of the spray you use. For more information on bug sprays, visit the National Pesticide Information Center at http://npic.ors.edu.  Avoid tick-infested areas. Avoid brushing against grasses, bushes, and other plants. Avoid walking through dead leaves and other ground vegetation. Do not sit on fallen logs. And avoid areas with large numbers of deer and rodents.  Check yourself for ticks. After being outdoors, check your clothes and skin for ticks. Keep in mind that the ticks are about the size of a poppy seed. Use both a hand-held and a full-length mirror to view all of your skin. Pay special attention to areas with hair. Make sure to thoroughly check these areas:  Scalp  Behind the ears  Armpits  Belly button  Waist  Groin  Backs of the knees  Use the clothes dryer. Putting clothing or bedding into a clothes dryer for 1 hour at high heat has been shown to kill ticks.  Control  ticks around your home  Create tick-free safety zones. Ticks that transmit Lyme disease live in ground vegetation. Ticks crawl onto people from shrubs and grasses in and around wooded areas. Cut bushes and other plants away from the deck or patio and any child play areas. Keep all grasses cut short. Remove dead leaves and other dead vegetation. Do not put children s play equipment near wooded areas. Put wood chips or gravel on the ground between lawns and wooded areas.  Use pesticides. You can apply them yourself or hire a pest control expert. States have different regulations about pesticides. Ask your local health department for information.  Keep deer away. Deer often carry the ticks that can infect you with Lyme disease. Do not attempt to pet or feed deer. Ask your local [place] center about deer-resistant plants. Ask your local health department about deer control in your area.  Prevent ticks on pets. Pets can bring ticks into your home. Use tick control medicine as advised by your . Check your pet for ticks after it comes indoors. Pay special attention to the ears.  Ask about local tick-control methods. Some states have tick-control programs. Local health departments may be using methods that can help you control ticks at home.  If you have a tick  If you find a tick on your skin, do not panic. Most ticks don't carry Lyme disease. And the tick must be attached for 36 to 48 hours before it might infect you. If you find a tick on you, here s what to do:  If the tick is not yet attached to your skin, remove the tick with tweezers or a tissue. Flush it down the toilet. If you see a tick on your clothes, use a piece of tape to lift it off. Do not touch it with your bare hands.  If the tick is attached to your skin:  Carefully remove the tick with tweezers. If you don t have tweezers, use your fingers protected by a paper towel or a thin cloth. Grasp the tick as close to your skin as possible. Pull slowly  and gently to remove the tick. The tick may not let go right away. Do not pull harder. Be patient and keep trying gently. Do not twist the head or body as you pull. Do not crush or squeeze the body. This can release the bacteria into your body. Never use a hot match, petroleum jelly, or other products to remove a tick. (If you can t remove the tick or if part of the tick remains in the skin, call your healthcare provider right away.)  Wash your skin with soap and water after you remove the tick. This will help ensure you remove any bacteria.  If you can, save the tick in a tightly sealed glass or plastic container. Take it to your healthcare provider. He or she may be able to have someone identify if it is the type of tick that transmits Lyme.  Call your healthcare provider and describe the bite and the tick. You may be asked to come in for an exam. You may be tested for Lyme. You may also be prescribed antibiotics to help prevent infection.  Over the next month, watch for the symptoms below, especially a rash at the site of the bite.  Symptoms of Lyme disease  Call your healthcare provider if you develop any symptoms of Lyme disease, even if you don t remember being bitten. These include:  A round, red rash (called a bull s-eye rash)  Fever and chills  Tiredness or body aches  Headache or a stiff neck  Joint pain and swelling (arthritis), especially in large joints such as the knees   To learn more  Centers for Disease Control and Prevention: www.cdc.gov/lyme  American Lyme Disease Foundation: www.aldf.com  Date Last Reviewed: 11/1/2016 2000-2017 [employer]. [address] [place] [address], [place] [address]. All rights reserved. This information is not intended as a substitute for professional medical care. Always follow your healthcare professional's instructions.         Patient Education   Tick Bite (No Antibiotics)    You have been bitten by a tick. Ticks are small arachnids that feed on the blood of  rodents, rabbits, birds, deer, dogs, and people. A tick bite may cause a reaction like a spider bite. You may have redness, itching, and slight swelling at the site. Sometimes you may have no reaction where the tick bit you.  Most tick bites are harmless. But some ticks carry diseases, such as Lyme disease or Jason Mountain spotted fever. These can be passed to people at the time of the bite. Lyme disease is of greatest concern. Right now you have no symptoms of Lyme disease or other serious reaction to the bite. It is important to watch for the warning signs, which could appear weeks to months after the tick bite.  Home care  The following will help you care for your bite at home:  If itching is a problem, avoid tight clothing and anything that heats up your skin. This includes hot showers or baths and direct sunlight. This will tend to make the itching worse.  An ice pack will reduce local areas of redness and itching. Make your own ice pack by putting ice cubes in a zip-top plastic bag and wrapping it in a thin towel. Creams containing benzocaine will reduce itching. These creams are available over the counter.  You can use an antihistamine with diphenhydramine if your doctor did not give you another antihistamine. This medicine may be used to reduce itching if large areas of the skin are involved. It is available at drugstores and groceries. If symptoms continue, talk with your doctor or pharmacist about over-the-counter medicines.  Follow-up care  Follow up with your healthcare provider, or as advised.  When to seek medical advice  Call your healthcare provider right away if any of these occur:  Signs of local infection. Watch for these during the next few days.  Increasing redness around the bite site  Increased pain or swelling  Fever over 100.4 F (38.0 C), or as directed by your healthcare provider  Fluid draining from the bite area  Signs of tick-related disease. Watch for these during the next few weeks to  months.  Circular, red, ring-like rash appears at the bite area within 1 to 3 weeks  A non-itchy red rash develops on your wrists or ankles and spreads. It may become purple (petechiae).  Red eyes  Tiredness, fever or chills, nausea or vomiting  Neck pain or stiffness, headache, or confusion  Muscle or bone aches  Irregular or rapid heartbeat  Joint pain or swelling, especially in the knee  Numbness, tingling, or weakness in the arms or legs  Weakness on one side of the face  Date Last Reviewed: 10/1/2016    1848-2063 [employer]. [address] [place] [address]. All rights reserved. This information is not intended as a substitute for professional medical care. Always follow your healthcare professional's instructions.         Patient Education   Tick Facts    Ticks are small arachnids that feed on the blood of rodents, rabbits, birds, deer, dogs, and humans. Ticks do not fly and do not drop from trees. They climb to the tips of plants along trails and attach themselves to you as you brush against the plant. Ticks may also attach to you if you come in contact with an infested animal.  Avoiding ticks  The following tips will help you avoid ticks:  When walking in tick-infested areas, tuck your pants into your boots or socks, and tuck your shirt into your pants.  Wear light-colored clothing so you can easily see any ticks that get on you.  Apply a tick repellent to pants, socks, and shoes.  Avoid brushing against the plants along trails.  Check yourself and your children at the end of each day when hiking through tick-infested areas. Don't forget to check in your hair as well.  Removing ticks  Removing ticks right away will reduce the chance of disease. Here are some tips for removing ticks:  If possible, have someone else remove the tick from you.  Protect your hands from exposure to the tick by using a tissue or rubber glove.  Ticks have hook-like barbs on their mouth, which they use to  attach themselves. Use tweezers or small needle-nose pliers instead of your fingers when removing a tick. Grasp the head as close to the skin as possible. Be careful not to squeeze the body, which would inject more fluid from the tick into your skin.  Pull gently and slowly away from skin until the mouthparts let go. If the tick fails to let go, stop pulling. While holding the head with tweezers, slowly turn it 90 degrees. Then, gently pull away from the skin again. This movement may unlock the tick's jaw and make it easier to remove.  Once you have removed the tick, look closely at the bite area. If you think there are still parts of the tick in your skin that you can't remove, contact your doctor.  Wash your hands and the bite site with soap and water.  Do not:  Crush or squeeze the tick with the tweezers.  Jerk the tick.  Burn or prick the tick.  Try to suffocate the tick with petroleum jelly or nail polish.  Identifying ticks  Most tick bites are harmless. But some ticks carry diseases, such as Lyme disease and Jason Mountain spotted fever. These can spread to people. Lyme disease is of greatest concern. It is carried by only one type of tick, the deer tick. Many public health departments are able to identify a tick to figure out if it is the type that causes Lyme disease. If this service is available in your area, bring the removed tick in a zip-top bag or jar to the public health department for identification. If you cannot identify the tick and if you were bitten in an area of the country where there is a risk of Lyme disease, contact your doctor.  Date Last Reviewed: 9/1/2016 2000-2017 [employer]. [address] [place] [address], [place] [address]. All rights reserved. This information is not intended as a substitute for professional medical care. Always follow your healthcare professional's instructions.

## 2023-06-02 LAB
B BURGDOR IGG SERPL QL IA: 0.94 INDEX
B BURGDOR IGG SERPL QL IA: ABNORMAL
B BURGDOR IGG+IGM SER QL: 0.91
B BURGDOR IGM SERPL QL IA: 0.14 INDEX
B BURGDOR IGM SERPL QL IA: NONREACTIVE

## 2023-08-01 ENCOUNTER — TRANSFERRED RECORDS (OUTPATIENT)
Dept: HEALTH INFORMATION MANAGEMENT | Facility: CLINIC | Age: 63
End: 2023-08-01
Payer: COMMERCIAL

## 2023-09-06 DIAGNOSIS — E78.5 HYPERLIPIDEMIA, UNSPECIFIED HYPERLIPIDEMIA TYPE: ICD-10-CM

## 2023-09-06 RX ORDER — ATORVASTATIN CALCIUM 80 MG/1
TABLET, FILM COATED ORAL
Qty: 90 TABLET | Refills: 2 | Status: SHIPPED | OUTPATIENT
Start: 2023-09-06 | End: 2024-05-28

## 2023-09-07 NOTE — TELEPHONE ENCOUNTER
"Last Written Prescription Date:  2/16/2023  Last Fill Quantity: 90,  # refills: 1   Last office visit provider:  6/1/2023     Requested Prescriptions   Pending Prescriptions Disp Refills    atorvastatin (LIPITOR) 80 MG tablet [Pharmacy Med Name: ATORVASTATIN 80MG TABLETS] 90 tablet 1     Sig: TAKE 1 TABLET(80 MG) BY MOUTH DAILY       Statins Protocol Passed - 9/6/2023  9:09 PM        Passed - LDL on file in past 12 months     Recent Labs   Lab Test 03/13/23  0910   LDL 83             Passed - No abnormal creatine kinase in past 12 months     No lab results found.             Passed - Recent (12 mo) or future (30 days) visit within the authorizing provider's specialty     Patient has had an office visit with the authorizing provider or a provider within the authorizing providers department within the previous 12 mos or has a future within next 30 days. See \"Patient Info\" tab in inbasket, or \"Choose Columns\" in Meds & Orders section of the refill encounter.              Passed - Medication is active on med list        Passed - Patient is age 18 or older             Nevaeh Pollock RN 09/06/23 10:46 PM  "

## 2023-09-08 DIAGNOSIS — E78.2 MIXED HYPERLIPIDEMIA: ICD-10-CM

## 2023-09-08 DIAGNOSIS — I25.10 CORONARY ARTERY DISEASE INVOLVING NATIVE HEART WITHOUT ANGINA PECTORIS, UNSPECIFIED VESSEL OR LESION TYPE: ICD-10-CM

## 2023-09-08 RX ORDER — EZETIMIBE 10 MG/1
10 TABLET ORAL DAILY
Qty: 90 TABLET | Refills: 3 | Status: SHIPPED | OUTPATIENT
Start: 2023-09-08 | End: 2024-09-21

## 2023-09-08 NOTE — TELEPHONE ENCOUNTER
Incoming fax request from [facility] on [place], [place] to refill Ezetimibe 10 mg tablets, 90 ay supply with refills. Last seen in cardiology 8/2022. Will route request to PCP, as seen earlier this year. No upcoming appts with cardiology.    Dr. Lu, please review refill medication request if agreeable please refill. Thank you MARLENE,Rn

## 2023-09-12 ENCOUNTER — MYC MEDICAL ADVICE (OUTPATIENT)
Dept: FAMILY MEDICINE | Facility: CLINIC | Age: 63
End: 2023-09-12
Payer: COMMERCIAL

## 2023-09-12 DIAGNOSIS — N52.9 ERECTILE DYSFUNCTION, UNSPECIFIED ERECTILE DYSFUNCTION TYPE: ICD-10-CM

## 2023-09-13 RX ORDER — SILDENAFIL 50 MG/1
50 TABLET, FILM COATED ORAL DAILY PRN
Qty: 30 TABLET | Refills: 1 | Status: SHIPPED | OUTPATIENT
Start: 2023-09-13

## 2023-09-13 NOTE — TELEPHONE ENCOUNTER
Routing refill request to provider for review/approval because:  Drug not active on patient's medication list    No prescriptions requested or ordered in this encounter    Last Written Prescription Date:  9/20/21  Last Fill Quantity: 30,  # refills: 1   Last office visitwith prescribing provider: 3/13/2023   Future Office Visit:  None scheduled    JERAMY Mayorga, RN  [facility]

## 2023-11-06 ENCOUNTER — TRANSFERRED RECORDS (OUTPATIENT)
Dept: HEALTH INFORMATION MANAGEMENT | Facility: CLINIC | Age: 63
End: 2023-11-06
Payer: COMMERCIAL

## 2024-02-14 ENCOUNTER — TRANSFERRED RECORDS (OUTPATIENT)
Dept: HEALTH INFORMATION MANAGEMENT | Facility: CLINIC | Age: 64
End: 2024-02-14
Payer: COMMERCIAL

## 2024-03-20 ENCOUNTER — TRANSFERRED RECORDS (OUTPATIENT)
Dept: HEALTH INFORMATION MANAGEMENT | Facility: CLINIC | Age: 64
End: 2024-03-20
Payer: COMMERCIAL

## 2024-03-27 ENCOUNTER — TRANSFERRED RECORDS (OUTPATIENT)
Dept: HEALTH INFORMATION MANAGEMENT | Facility: CLINIC | Age: 64
End: 2024-03-27
Payer: COMMERCIAL

## 2024-04-09 ENCOUNTER — TRANSFERRED RECORDS (OUTPATIENT)
Dept: HEALTH INFORMATION MANAGEMENT | Facility: CLINIC | Age: 64
End: 2024-04-09
Payer: COMMERCIAL

## 2024-04-12 DIAGNOSIS — I25.10 CORONARY ARTERY DISEASE INVOLVING NATIVE HEART WITHOUT ANGINA PECTORIS, UNSPECIFIED VESSEL OR LESION TYPE: ICD-10-CM

## 2024-04-12 RX ORDER — METOPROLOL SUCCINATE 50 MG/1
25 TABLET, EXTENDED RELEASE ORAL DAILY
Qty: 45 TABLET | Refills: 3 | OUTPATIENT
Start: 2024-04-12

## 2024-04-12 NOTE — TELEPHONE ENCOUNTER
Medication Question or Refill    Contacts         Type Contact Phone/Fax    04/12/2024 09:19 AM CDT Phone (Incoming) Minor Murcia (Self) [phone] (M)            What medication are you calling about (include dose and sig)?: metoprolol succinate ER (TOPROL XL) 50 MG 24 hr tablet     Preferred Pharmacy:  [facility] #[id number] [place] [address]  Phone: [phone] Fax: [phone]    Controlled Substance Agreement on file:   CSA -- Patient Level:    CSA: None found at the patient level.       Who prescribed the medication?: Lu    Do you need a refill? Yes    When did you use the medication last? N/A

## 2024-04-18 DIAGNOSIS — I25.10 CORONARY ARTERY DISEASE INVOLVING NATIVE HEART WITHOUT ANGINA PECTORIS, UNSPECIFIED VESSEL OR LESION TYPE: ICD-10-CM

## 2024-04-18 NOTE — TELEPHONE ENCOUNTER
Medication Question or Refill    Contacts         Type Contact Phone/Fax    04/18/2024 04:09 PM CDT Phone (Incoming) Minor Murcia (Self) [phone] ()            What medication are you calling about (include dose and sig)?: metoprolol succinate ER (TOPROL XL) 50 MG 24 hr tablet     Preferred Pharmacy:  [facility] #[id number] [place] [address] AT [address] [address]  Phone: [phone] Fax: [phone]        Controlled Substance Agreement on file:   CSA -- Patient Level:    CSA: None found at the patient level.       Who prescribed the medication?: Lu    Do you need a refill? Yes    When did you use the medication last? N/A    Patient offered an appointment? No    Do you have any questions or concerns?  Yes: Patient stated he's out of his medication and doesn't have any refills left to use at his pharmacy      Could we send this information to you in [email or web address] or would you prefer to receive a phone call?:   Patient would prefer a phone call   Okay to leave a detailed message?: Yes at Home number on file [phone] (home)

## 2024-04-19 RX ORDER — METOPROLOL SUCCINATE 50 MG/1
25 TABLET, EXTENDED RELEASE ORAL DAILY
Qty: 45 TABLET | Refills: 3 | OUTPATIENT
Start: 2024-04-19

## 2024-04-22 DIAGNOSIS — I25.10 CORONARY ARTERY DISEASE INVOLVING NATIVE HEART WITHOUT ANGINA PECTORIS, UNSPECIFIED VESSEL OR LESION TYPE: ICD-10-CM

## 2024-04-22 RX ORDER — METOPROLOL SUCCINATE 50 MG/1
25 TABLET, EXTENDED RELEASE ORAL DAILY
Qty: 45 TABLET | Refills: 0 | Status: SHIPPED | OUTPATIENT
Start: 2024-04-22 | End: 2024-06-07

## 2024-04-22 NOTE — TELEPHONE ENCOUNTER
Medication Question or Refill    Contacts         Type Contact Phone/Fax    04/22/2024 10:18 AM CDT Phone (Incoming) LuMinor perry (Self) [phone] (M)            What medication are you calling about (include dose and sig)?: metoprolol succinate ER (TOPROL XL) 50 MG 24 hr tablet     Preferred Pharmacy:  [facility] #[id number] [address] AT [address] [address]  Phone: [phone] Fax: [phone]        Controlled Substance Agreement on file:   CSA -- Patient Level:    CSA: None found at the patient level.       Who prescribed the medication?: Lu    Do you need a refill? Yes    When did you use the medication last? N/A    Patient offered an appointment? No    Do you have any questions or concerns?  Yes: Patient called and stated he doesn't have any refills left on his medication at the pharmacy. Writer called the pharmacy to double check since refill request keeps getting denied. Pharmacy stated that the Rx was closed and they are unable to use the last refill on the Rx and they need a whole new Rx to use to fill pt's medication. Please advise      Could we send this information to you in [email or web address] or would you prefer to receive a phone call?:   Patient would prefer a phone call   Okay to leave a detailed message?: Yes at Home number on file [phone] (home)

## 2024-04-29 ENCOUNTER — TRANSFERRED RECORDS (OUTPATIENT)
Dept: HEALTH INFORMATION MANAGEMENT | Facility: CLINIC | Age: 64
End: 2024-04-29
Payer: COMMERCIAL

## 2024-05-01 ENCOUNTER — OFFICE VISIT (OUTPATIENT)
Dept: FAMILY MEDICINE | Facility: CLINIC | Age: 64
End: 2024-05-01
Payer: COMMERCIAL

## 2024-05-01 VITALS
HEART RATE: 75 BPM | SYSTOLIC BLOOD PRESSURE: 120 MMHG | OXYGEN SATURATION: 98 % | TEMPERATURE: 98.1 F | RESPIRATION RATE: 15 BRPM | WEIGHT: 186.7 LBS | BODY MASS INDEX: 26.14 KG/M2 | DIASTOLIC BLOOD PRESSURE: 70 MMHG | HEIGHT: 71 IN

## 2024-05-01 DIAGNOSIS — I10 ESSENTIAL HYPERTENSION: ICD-10-CM

## 2024-05-01 DIAGNOSIS — M25.512 ACUTE PAIN OF BOTH SHOULDERS: ICD-10-CM

## 2024-05-01 DIAGNOSIS — M54.2 NECK PAIN: ICD-10-CM

## 2024-05-01 DIAGNOSIS — M25.552 BILATERAL HIP PAIN: Primary | ICD-10-CM

## 2024-05-01 DIAGNOSIS — Z13.1 DIABETES MELLITUS SCREENING: ICD-10-CM

## 2024-05-01 DIAGNOSIS — M25.551 BILATERAL HIP PAIN: Primary | ICD-10-CM

## 2024-05-01 DIAGNOSIS — Z12.11 COLON CANCER SCREENING: ICD-10-CM

## 2024-05-01 DIAGNOSIS — M25.511 ACUTE PAIN OF BOTH SHOULDERS: ICD-10-CM

## 2024-05-01 DIAGNOSIS — E83.41 HYPERMAGNESEMIA: ICD-10-CM

## 2024-05-01 DIAGNOSIS — E78.5 HYPERLIPIDEMIA, UNSPECIFIED HYPERLIPIDEMIA TYPE: ICD-10-CM

## 2024-05-01 LAB
BASOPHILS # BLD AUTO: 0 10E3/UL (ref 0–0.2)
BASOPHILS NFR BLD AUTO: 0 %
EOSINOPHIL # BLD AUTO: 0.1 10E3/UL (ref 0–0.7)
EOSINOPHIL NFR BLD AUTO: 1 %
ERYTHROCYTE [DISTWIDTH] IN BLOOD BY AUTOMATED COUNT: 12.1 % (ref 10–15)
ERYTHROCYTE [SEDIMENTATION RATE] IN BLOOD BY WESTERGREN METHOD: 64 MM/HR (ref 0–20)
HBA1C MFR BLD: 5.8 % (ref 0–5.6)
HCT VFR BLD AUTO: 36.5 % (ref 40–53)
HGB BLD-MCNC: 12.1 G/DL (ref 13.3–17.7)
IMM GRANULOCYTES # BLD: 0 10E3/UL
IMM GRANULOCYTES NFR BLD: 0 %
LYMPHOCYTES # BLD AUTO: 1.3 10E3/UL (ref 0.8–5.3)
LYMPHOCYTES NFR BLD AUTO: 17 %
MCH RBC QN AUTO: 28.9 PG (ref 26.5–33)
MCHC RBC AUTO-ENTMCNC: 33.2 G/DL (ref 31.5–36.5)
MCV RBC AUTO: 87 FL (ref 78–100)
MONOCYTES # BLD AUTO: 1.3 10E3/UL (ref 0–1.3)
MONOCYTES NFR BLD AUTO: 16 %
NEUTROPHILS # BLD AUTO: 5.2 10E3/UL (ref 1.6–8.3)
NEUTROPHILS NFR BLD AUTO: 65 %
PLATELET # BLD AUTO: 271 10E3/UL (ref 150–450)
RBC # BLD AUTO: 4.19 10E6/UL (ref 4.4–5.9)
WBC # BLD AUTO: 7.9 10E3/UL (ref 4–11)

## 2024-05-01 PROCEDURE — 80061 LIPID PANEL: CPT | Performed by: FAMILY MEDICINE

## 2024-05-01 PROCEDURE — 99214 OFFICE O/P EST MOD 30 MIN: CPT | Performed by: FAMILY MEDICINE

## 2024-05-01 PROCEDURE — 83735 ASSAY OF MAGNESIUM: CPT | Performed by: FAMILY MEDICINE

## 2024-05-01 PROCEDURE — 36415 COLL VENOUS BLD VENIPUNCTURE: CPT | Performed by: FAMILY MEDICINE

## 2024-05-01 PROCEDURE — 85379 FIBRIN DEGRADATION QUANT: CPT | Performed by: FAMILY MEDICINE

## 2024-05-01 PROCEDURE — 83036 HEMOGLOBIN GLYCOSYLATED A1C: CPT | Performed by: FAMILY MEDICINE

## 2024-05-01 PROCEDURE — 80053 COMPREHEN METABOLIC PANEL: CPT | Performed by: FAMILY MEDICINE

## 2024-05-01 PROCEDURE — 85652 RBC SED RATE AUTOMATED: CPT | Performed by: FAMILY MEDICINE

## 2024-05-01 PROCEDURE — 86140 C-REACTIVE PROTEIN: CPT | Performed by: FAMILY MEDICINE

## 2024-05-01 PROCEDURE — 85025 COMPLETE CBC W/AUTO DIFF WBC: CPT | Performed by: FAMILY MEDICINE

## 2024-05-01 RX ORDER — OXYCODONE HYDROCHLORIDE 5 MG/1
5-10 TABLET ORAL
Qty: 14 TABLET | Refills: 0 | Status: SHIPPED | OUTPATIENT
Start: 2024-05-01

## 2024-05-01 ASSESSMENT — PAIN SCALES - GENERAL: PAINLEVEL: SEVERE PAIN (6)

## 2024-05-01 NOTE — Clinical Note
Hey not sure what is going on with this pt but he really seems to be in pain - here's my note. Will see what the test results show, encouraged him to follow-up with ortho. Let me know if you have any other thoughts!

## 2024-05-01 NOTE — PROGRESS NOTES
Assessment/Plan:    Bilateral hip pain  Unclear etiology - no exam findings to support septic joint and no fever - seems to me that this should be improving by now if it was a post-injection flare/inflammation. Will evaluate with labs as below. Encouraged pt to schedule follow-up apt with his ortho provider. Discussed ice/heat, tylenol, avoid NSAIDs given CV issues, trial oxycodone at bedtime to help with pain and sleep. Do not feel that muscle relaxer or gabapentin would provide appropriate pain control.   - ESR: Erythrocyte sedimentation rate  - CRP, inflammation  - oxyCODONE (ROXICODONE) 5 MG tablet  Dispense: 14 tablet; Refill: 0    Neck pain  Acute pain of both shoulders  Unclear etiology - possible related to hip pain and adjustments in rest of body/muscular tightness/spasm - doesn't describe breathing issues for PE - will check labs as below.   - CBC with platelets and differential  - ESR: Erythrocyte sedimentation rate  - CRP, inflammation  - D dimer, quantitative  - oxyCODONE (ROXICODONE) 5 MG tablet  Dispense: 14 tablet; Refill: 0    Hyperlipidemia, unspecified hyperlipidemia type  Hx HLD, due for lipid panel.  - Lipid panel reflex to direct LDL Non-fasting    Diabetes mellitus screening  Givne age, due for diabetes screening.  - Hemoglobin A1c    Essential hypertension  Hx HTN, due for labs.  - Comprehensive metabolic panel (BMP + Alb, Alk Phos, ALT, AST, Total. Bili, TP)    Hypermagnesemia  Mildly elevated magnesium level on last check -pt reports taking magnesium supplement - will recheck level now.  - Magnesium       Follow up: pending test results    Deanna Lisa MD  [facility]    Subjective:   Minor Murcia is a 64 year old male is here today for hip pain    -injections 3 weeks ago - was ok for 4 days and then got really painful  -replacement scheduled in July  -went to urgent care - was told that it may have caused reverse reaction - should plateau in a week  -can't sleep d/t  pain  -can't roll over - fell out of bed  -pain is tolerable during the day  -hx CAD - tried ibuprofen without improvement - tried tylenol and no benefit  -pain in neck and shoulders  -no SOB or breathing issues - no fever  -had shaking chills  - reviewed - no scripts on record    Answers submitted by the patient for this visit:  General Questionnaire (Submitted on 5/1/2024)  Chief Complaint: Chronic problems general questions HPI Form  How many servings of fruits and vegetables do you eat daily?: 0-1  On average, how many sweetened beverages do you drink each day (Examples: soda, juice, sweet tea, etc.  Do NOT count diet or artificially sweetened beverages)?: 0  How many minutes a day do you exercise enough to make your heart beat faster?: 9 or less  How many days a week do you exercise enough to make your heart beat faster?: 3 or less  How many days per week do you miss taking your medication?: 0  General Concern (Submitted on 5/1/2024)  Chief Complaint: Chronic problems general questions HPI Form  What is the reason for your visit today?: Hip,neck,and shoulder pain  What are your symptoms?: Hip,neck,and shoulder pain  How would you describe these symptoms?: Severe  Are your symptoms:: Staying the same  Have you had these symptoms before?: No    Patient Active Problem List   Diagnosis    Hyperlipidemia    Impaired Fasting Glucose    Arteriosclerotic Cardiovascular Disease (ASCVD)    CAD (coronary artery disease)    Ischemic chest pain (H24)    Abnormal stress test    Coronary artery disease involving native coronary artery of native heart, angina presence unspecified     Past Medical History:   Diagnosis Date    Coronary artery disease     High cholesterol     Hypertension     Myocardial infarction (H) 2005     Past Surgical History:   Procedure Laterality Date    CARDIAC CATHETERIZATION      CORONARY STENT PLACEMENT  2005    SD CATH PLACEMENT & NJX CORONARY ART ANGIO IMG S&I N/A 4/14/2017    Procedure:  Coronary Angiogram;  Surgeon: Roverto Hollis MD;  Location: [facility] Cath Lab;  Service: Cardiology    [facility] CATH PLMT L HRT & ARTS W/NJX & ANGIO IMG S&I Left 4/14/2017    Procedure: Left Heart Catheterization Without Left Ventriculogram;  Surgeon: Roverto Hollis MD;  Location: [facility] Cath Lab;  Service: Cardiology     Current Outpatient Medications   Medication Sig Dispense Refill    aspirin 81 MG EC tablet [ASPIRIN 81 MG EC TABLET] Take 81 mg by mouth daily.      atorvastatin (LIPITOR) 80 MG tablet TAKE 1 TABLET(80 MG) BY MOUTH DAILY 90 tablet 2    ezetimibe (ZETIA) 10 MG tablet Take 1 tablet (10 mg) by mouth daily 90 tablet 3    lisinopril (ZESTRIL) 20 MG tablet TAKE 1 TABLET(20 MG) BY MOUTH DAILY 90 tablet 3    metoprolol succinate ER (TOPROL XL) 50 MG 24 hr tablet Take 0.5 tablets (25 mg) by mouth daily 45 tablet 0    multivitamin (CENTRUM SILVER) tablet Take 1 tablet by mouth daily      nitroglycerin (NITROSTAT) 0.4 MG SL tablet [NITROGLYCERIN (NITROSTAT) 0.4 MG SL TABLET] ONE TABLET UNDER TONGUE AS NEEDED FOR CHEST PAIN MAY REPEAT EVERY 5 MINUTES X 2. IF NO RELIEF DIAL 911 25 tablet 1    oxyCODONE (ROXICODONE) 5 MG tablet Take 1-2 tablets (5-10 mg) by mouth nightly as needed for severe pain 14 tablet 0    sildenafil (VIAGRA) 50 MG tablet Take 1 tablet (50 mg) by mouth daily as needed (ED) 30 tablet 1     No current facility-administered medications for this visit.     No Known Allergies  Social History     Socioeconomic History    Marital status:      Spouse name: Not on file    Number of children: Not on file    Years of education: Not on file    Highest education level: Not on file   Occupational History    Not on file   Tobacco Use    Smoking status: Never    Smokeless tobacco: Never   Vaping Use    Vaping status: Never Used   Substance and Sexual Activity    Alcohol use: Yes     Alcohol/week: 1.0 - 2.0 standard drink of alcohol    Drug use: No    Sexual activity: Not on file   Other Topics  "Concern    Not on file   Social History Narrative    Not on file     Social Determinants of Health     Financial Resource Strain: Not on file   Food Insecurity: Not on file   Transportation Needs: Not on file   Physical Activity: Not on file   Stress: Not on file   Social Connections: Not on file   Interpersonal Safety: Low Risk  (5/1/2024)    Interpersonal Safety     Do you feel physically and emotionally safe where you currently live?: Yes     Within the past 12 months, have you been hit, slapped, kicked or otherwise physically hurt by someone?: No     Within the past 12 months, have you been humiliated or emotionally abused in other ways by your partner or ex-partner?: No   Housing Stability: Not on file     Family History   Problem Relation Age of Onset    Cancer Mother     Coronary Artery Disease Father      Review of systems is as stated in HPI, and the remainder of system review is otherwise negative.    Objective:     /70   Pulse 75   Temp 98.1  F (36.7  C) (Temporal)   Resp 15   Ht 1.803 m (5' 11\")   Wt 84.7 kg (186 lb 11.2 oz)   SpO2 98%   BMI 26.04 kg/m      General appearance: awake, NAD but appears uncomfortable, standing during most of visit  HEENT: atraumatic, normocephalic, no scleral icterus or injection, moist mucous membranes  CV: RRR, no murmurs/rubs/gallops, normal S1 and S2  Lungs: CTAB, no wheezes or crackles, breathing comfortably on room air  Extremities: hip injection sites normal - no redness, induration, pain with palpation, swelling; no LE edema bilaterally, strong peripheral pulses bilaterally  Skin: no rashes or lesions  Neuro: alert, oriented x3, CNs grossly intact, no focal deficits appreciated  Psych: normal mood/affect/behavior, answering questions appropriately, linear thought process    "

## 2024-05-02 ENCOUNTER — HOSPITAL ENCOUNTER (EMERGENCY)
Facility: HOSPITAL | Age: 64
Discharge: HOME | End: 2024-05-02
Attending: EMERGENCY MEDICINE | Admitting: EMERGENCY MEDICINE
Payer: COMMERCIAL

## 2024-05-02 VITALS
SYSTOLIC BLOOD PRESSURE: 131 MMHG | RESPIRATION RATE: 16 BRPM | TEMPERATURE: 99.2 F | OXYGEN SATURATION: 95 % | HEIGHT: 71 IN | WEIGHT: 186 LBS | HEART RATE: 73 BPM | BODY MASS INDEX: 26.04 KG/M2 | DIASTOLIC BLOOD PRESSURE: 77 MMHG

## 2024-05-02 DIAGNOSIS — M13.0 POLYARTHRITIS: ICD-10-CM

## 2024-05-02 LAB
ANION GAP SERPL CALCULATED.3IONS-SCNC: 10 MMOL/L (ref 7–15)
B BURGDOR IGG+IGM SER QL: 0.74
BUN SERPL-MCNC: 18.9 MG/DL (ref 8–23)
CALCIUM SERPL-MCNC: 9.4 MG/DL (ref 8.8–10.2)
CHLORIDE SERPL-SCNC: 104 MMOL/L (ref 98–107)
CK SERPL-CCNC: 46 U/L (ref 39–308)
CREAT SERPL-MCNC: 0.8 MG/DL (ref 0.67–1.17)
CRP SERPL-MCNC: 72.3 MG/L
D DIMER PPP FEU-MCNC: <0.27 UG/ML FEU (ref 0–0.5)
DEPRECATED HCO3 PLAS-SCNC: 26 MMOL/L (ref 22–29)
EGFRCR SERPLBLD CKD-EPI 2021: >90 ML/MIN/1.73M2
ERYTHROCYTE [DISTWIDTH] IN BLOOD BY AUTOMATED COUNT: 12.2 % (ref 10–15)
ERYTHROCYTE [SEDIMENTATION RATE] IN BLOOD BY WESTERGREN METHOD: 58 MM/HR (ref 0–20)
FLUAV RNA SPEC QL NAA+PROBE: NEGATIVE
FLUBV RNA RESP QL NAA+PROBE: NEGATIVE
GLUCOSE SERPL-MCNC: 98 MG/DL (ref 70–99)
HCT VFR BLD AUTO: 36.3 % (ref 40–53)
HGB BLD-MCNC: 12 G/DL (ref 13.3–17.7)
MCH RBC QN AUTO: 28.9 PG (ref 26.5–33)
MCHC RBC AUTO-ENTMCNC: 33.1 G/DL (ref 31.5–36.5)
MCV RBC AUTO: 88 FL (ref 78–100)
PLATELET # BLD AUTO: 260 10E3/UL (ref 150–450)
POTASSIUM SERPL-SCNC: 4 MMOL/L (ref 3.4–5.3)
RBC # BLD AUTO: 4.15 10E6/UL (ref 4.4–5.9)
RSV RNA SPEC NAA+PROBE: NEGATIVE
SARS-COV-2 RNA RESP QL NAA+PROBE: NEGATIVE
SODIUM SERPL-SCNC: 140 MMOL/L (ref 135–145)
WBC # BLD AUTO: 7.9 10E3/UL (ref 4–11)

## 2024-05-02 PROCEDURE — 86140 C-REACTIVE PROTEIN: CPT | Performed by: EMERGENCY MEDICINE

## 2024-05-02 PROCEDURE — 99284 EMERGENCY DEPT VISIT MOD MDM: CPT | Mod: 25

## 2024-05-02 PROCEDURE — 82374 ASSAY BLOOD CARBON DIOXIDE: CPT | Performed by: EMERGENCY MEDICINE

## 2024-05-02 PROCEDURE — 250N000012 HC RX MED GY IP 250 OP 636 PS 637: Performed by: EMERGENCY MEDICINE

## 2024-05-02 PROCEDURE — 82550 ASSAY OF CK (CPK): CPT | Performed by: EMERGENCY MEDICINE

## 2024-05-02 PROCEDURE — 87040 BLOOD CULTURE FOR BACTERIA: CPT | Performed by: EMERGENCY MEDICINE

## 2024-05-02 PROCEDURE — 36415 COLL VENOUS BLD VENIPUNCTURE: CPT | Performed by: EMERGENCY MEDICINE

## 2024-05-02 PROCEDURE — 86618 LYME DISEASE ANTIBODY: CPT | Performed by: EMERGENCY MEDICINE

## 2024-05-02 PROCEDURE — 85027 COMPLETE CBC AUTOMATED: CPT | Performed by: EMERGENCY MEDICINE

## 2024-05-02 PROCEDURE — 250N000011 HC RX IP 250 OP 636: Performed by: EMERGENCY MEDICINE

## 2024-05-02 PROCEDURE — 96374 THER/PROPH/DIAG INJ IV PUSH: CPT

## 2024-05-02 PROCEDURE — 85652 RBC SED RATE AUTOMATED: CPT | Performed by: EMERGENCY MEDICINE

## 2024-05-02 PROCEDURE — 87637 SARSCOV2&INF A&B&RSV AMP PRB: CPT | Performed by: EMERGENCY MEDICINE

## 2024-05-02 PROCEDURE — 86431 RHEUMATOID FACTOR QUANT: CPT | Performed by: EMERGENCY MEDICINE

## 2024-05-02 RX ORDER — KETOROLAC TROMETHAMINE 15 MG/ML
15 INJECTION, SOLUTION INTRAMUSCULAR; INTRAVENOUS ONCE
Status: COMPLETED | OUTPATIENT
Start: 2024-05-02 | End: 2024-05-02

## 2024-05-02 RX ORDER — PREDNISONE 10 MG/1
TABLET ORAL
Qty: 23 TABLET | Refills: 0 | Status: SHIPPED | OUTPATIENT
Start: 2024-05-03 | End: 2024-05-15

## 2024-05-02 RX ORDER — PREDNISONE 20 MG/1
40 TABLET ORAL ONCE
Status: COMPLETED | OUTPATIENT
Start: 2024-05-02 | End: 2024-05-02

## 2024-05-02 RX ADMIN — KETOROLAC TROMETHAMINE 15 MG: 15 INJECTION, SOLUTION INTRAMUSCULAR; INTRAVENOUS at 12:05

## 2024-05-02 RX ADMIN — PREDNISONE 40 MG: 20 TABLET ORAL at 12:06

## 2024-05-02 ASSESSMENT — ACTIVITIES OF DAILY LIVING (ADL)
ADLS_ACUITY_SCORE: 35
ADLS_ACUITY_SCORE: 35

## 2024-05-02 ASSESSMENT — COLUMBIA-SUICIDE SEVERITY RATING SCALE - C-SSRS
6. HAVE YOU EVER DONE ANYTHING, STARTED TO DO ANYTHING, OR PREPARED TO DO ANYTHING TO END YOUR LIFE?: NO
1. IN THE PAST MONTH, HAVE YOU WISHED YOU WERE DEAD OR WISHED YOU COULD GO TO SLEEP AND NOT WAKE UP?: NO
2. HAVE YOU ACTUALLY HAD ANY THOUGHTS OF KILLING YOURSELF IN THE PAST MONTH?: NO

## 2024-05-02 NOTE — ED TRIAGE NOTES
Patient arrives via EMS for complaints of bilateral hip pain that has progressively gotten worse in the past 3 weeks after receiving bilateral cortisone injections. Patient states he is also now having pain in his neck that radiates down bilateral arms. Patient states he went to [place] on Monday and they state they do not believe his increased pain is related to the cortisone shot. Patient saw primary care yesterday who looked at injection sites and ran blood work. PCP prescribed Oxycodone for patient, and patient is having minimal relief.      Triage Assessment (Adult)       Row Name 05/02/24 1116          Triage Assessment    Airway WDL WDL        Respiratory WDL    Respiratory WDL WDL        Skin Circulation/Temperature WDL    Skin Circulation/Temperature WDL WDL        Cardiac WDL    Cardiac WDL WDL        Peripheral/Neurovascular WDL    Peripheral Neurovascular WDL WDL        Cognitive/Neuro/Behavioral WDL    Cognitive/Neuro/Behavioral WDL WDL

## 2024-05-02 NOTE — DISCHARGE INSTRUCTIONS
Your inflammatory markers here were high, but this does not tell us exactly what type of inflammation is causing your pain. I placed a referral to rheumatology, somebody should call to help set up an appointment.    Please take your next dose of prednisone tomorrow.    Please reach out to your primary care doctor for follow-up within the next couple of weeks, it may be about a month into rheumatology is able to fit you in.

## 2024-05-02 NOTE — ED PROVIDER NOTES
.EMERGENCY DEPARTMENT ENCOUNTER      NAME: Minor Murcia  AGE: 64 year old male  YOB: 1960  MRN: [id number]  EVALUATION DATE & TIME: 5/2/2024 11:11 AM    PCP: Valentino Lu    ED PROVIDER: Basil Emery M.D.      Chief Complaint   Patient presents with    Musculoskeletal Problem         FINAL IMPRESSION:  1. Polyarthritis          ED COURSE & MEDICAL DECISION MAKING:    Pertinent Labs & Imaging studies reviewed below.  All EKGs below represent my independent interpretation.      Patient is a 64-year-old gentleman who presents with bilateral hip pain, back pain, neck pain, left shoulder and elbow pain.  Symptoms have been progressing over the last couple of weeks, after getting cortisone injection in bilateral hips.  He had difficulty getting out of bed due to hip pain this morning and came in.  He has normal blood pressure, temperature 39.2, normal heart rate and respiratory rate.  Nontoxic, but uncomfortable appearing.  Able to passively range his extremities with some pain but he is difficulty with active range of motion secondary to pain.  The joints are not swollen, erythematous or warm.  He has no overlying rash.  Differential includes polyarthritis, osteoarthritis, rheumatoid versus polymyalgia rheumatica.  I also ordered screening labs for Lyme disease.  He was given steroids here and felt significantly improved, was able to flex at the hips easily.    I also spoke to rheumatology, and recommended to add on some labs which I did.  I placed an outpatient referral.    His workup here showed an elevated CRP of 72.3, CK was normal, ESR elevated at 58.  He is normal white blood cell count, negative viral panel, normal BMP.    Blood culture is collected, unlikely to be positive given his overall picture.     Patient discharged with prednisone taper.    Additional ED Course Timestamps:  11:31 I met with the patient to gather history and to perform my initial exam. We discussed plans for the ED  course, including diagnostic testing and treatment.    1:35 PM Rechecked and updated patient. I discussed plans for discharge with the patient, which they were agreeable to. We discussed supportive cares at home and reasons for return to the ER including new or worsening symptoms. All questions and concerns were addressed. Patient to be discharged by RN.     Medical Decision Making  Obtained supplemental history:Supplemental history obtained?: Family Member/Significant Other  Reviewed external records: External records reviewed?: Outpatient Record: [facility] visit on 05/01/24  Care impacted by chronic illness:Heart Disease, Hyperlipidemia, and Hypertension  Care significantly affected by social determinants of health:N/A  Did you consider but not order tests?: Work up considered but not performed and documented in chart, if applicable  Did you interpret images independently?: Independent interpretation of ECG and images noted in documentation, when applicable.  Consultation discussion with other provider:Did you involve another provider (consultant, MH, pharmacy, etc.)?: I discussed the care with another health care provider, see documentation for details.  Discharge. I prescribed additional prescription strength medication(s) as charted. N/A.    At the conclusion of the encounter I discussed the results of all of the tests and the disposition. The questions were answered. The patient or family acknowledged understanding and was agreeable with the care plan.       MEDICATIONS GIVEN IN THE EMERGENCY:  Medications   ketorolac (TORADOL) injection 15 mg (15 mg Intravenous $Given 5/2/24 1205)   predniSONE (DELTASONE) tablet 40 mg (40 mg Oral $Given 5/2/24 1206)         NEW PRESCRIPTIONS STARTED AT TODAY'S ER VISIT  Discharge Medication List as of 5/2/2024  2:00 PM        START taking these medications    Details   predniSONE (DELTASONE) 10 MG tablet Take 4 tablets (40 mg) by mouth daily for 3 days,  THEN 2 tablets (20 mg) daily for 3 days, THEN 1 tablet (10 mg) daily for 3 days, THEN 0.5 tablets (5 mg) daily for 3 days. Please take your first dose tomorrow, Disp-23 tablet, R-0, E-Prescribe                =================================================================    HPI    Minor Murcia is a 64 year old male with a pertinent medical history of myocardial infarction, CAD, ASCVD, ischemic chest pain, HLD, HTN, who presents to this ED for evaluation of hip pain, neck pain, and shoulder pain.    Per Chart Review, patient was seen at [facility] on 05/01/24. Patient reported having bilateral hip pain for approximately 3 weeks, which started 4 days after receiving cortisone injections in bilateral hips. Unclear etiology of the hip pain, with no exam findings to support septic joint and no fever. Patient was encouraged to schedule follow-up apt with his ortho provider. Patient also reported having neck pain and acute pain of both shoulders. Also unclear etiology. No associated breathing issues. ESR 64, hemoglobin A1c 5.8, RBC count 4.19, hemoglobin 12.1, hematocrit 36.5, but remainder of CBC unremarkable and all other labs otherwise in process. Patient was discharged with a prescription of Oxycodone.     Patient endorses the history above.     Patient reports that approximately one year ago he developed right hip pain which prompted him to be evaluated at [facility] where XR imaging displayed moderate arthritis in his right hip. He endorses receiving a cortisone injection in his right hip at that time. He notes that then a couple of months ago he developed bilateral hip pains which prompted him to visit [facility] once again, and he mentions that this time XR imaging displayed severe arthritis in both hips, and he was scheduled for a left hip replacement in July 2024. He states that approximately 2 months ago he was prescribed a steroid pack for his bilateral hip pains, which  "he notes provided relief, however, he states that the day after he finished his regimen of steroids his bilateral hip pain returned. He reports he then asked [facility] for more of the steroid pack, but they denied it any offered him to receive bilateral hip cortisone injections. He endorses undergoing the bilateral hip cortisone injections approximately 3 weeks ago, but notes that after approximately 3 days of relief his bilateral hip pains returned and were worsened than his initial hip pains. He states that then shortly after this his bilaterally hip pains progressively began to radiate down his bilateral legs, shortly after this he additionally developed neck pain, and shortly after this his neck pain have progressively began to radiate down his bilateral arms to his elbows. He endorses associated chills at nights for the past couple of days as well.  He endorses difficulty ambulating secondary to his hip pains. He denies any recent falls or injuries. He endorses taking 1 Oxycodone last night for his symptoms, but he notes it only provided relief for approximately 4 hours. He endorses taking 2 arthritis strength Tylenol and 1 Oxycodone for his symptoms at approximately 6:00 AM today. He endorses taking all of his prescription medications as directed, including regularly taking 81 mg ASA. He denies any recent rash, or any other complications at this time.     Patient's wife endorses a patient PMH of chronic pain and 3 cardiac stent placements. She notes that the patient has lost approximately 9 pounds in the past 2 weeks secondary to decreased appetite. She states that the patient is unable to take Ibuprofen due to his heart.       VITALS:  /77   Pulse 73   Temp 99.2  F (37.3  C) (Oral)   Resp 16   Ht 1.803 m (5' 11\")   Wt 84.4 kg (186 lb)   SpO2 95%   BMI 25.94 kg/m      PHYSICAL EXAM    Constitutional: Uncomfortable appearing. Well developed, well nourished.   HENT: Atraumatic, mucous " membranes moist, nose normal. Neck- Pain in neck with bilateral rotation, but no redness, warmth, swelling, or midline tenderness to touch.   Eyes: Pupils mid-range, conjunctiva without injection, no discharge.   Respiratory: Clear to auscultation bilaterally, no respiratory distress, no wheezing, speaks full sentences easily. No cough.  Cardiovascular: Normal heart rate, regular rhythm, no murmurs.   GI: Soft, no tenderness to deep palpation in all quadrants, no masses.  Musculoskeletal: Reproducible pain in both anterior hips with active flexion. Pain in left elbow with flexion and extension. No areas are red, warm, swollen, or tender to touch.   Skin: Warm, dry, no rash.  Neurologic: Alert & oriented x 3, cranial nerves grossly intact.  Psychiatric: Affect normal, cooperative.      PROCEDURES:   None.      I, Dusty Chapo am serving as a scribe to document services personally performed by Dr. Basil Emery based on my observation and the provider's statements to me. I, Basil Emery MD attest that Dusty Cowan is acting in a scribe capacity, has observed my performance of the services and has documented them in accordance with my direction.    Basil Emery M.D.  Emergency Medicine  [facility] EMERGENCY DEPARTMENT  [facility] [facility] [id number] [phone]  Dept: [phone]       Basil Emery MD  05/02/24 3633

## 2024-05-02 NOTE — ED NOTES
Bed: [facility]NEDH-I  Expected date: 5/2/24  Expected time: 10:52 AM  Means of arrival:   Comments:  [place]/pain at injection sites in hips

## 2024-05-03 LAB
ALBUMIN SERPL BCG-MCNC: 4.1 G/DL (ref 3.5–5.2)
ALP SERPL-CCNC: 82 U/L (ref 40–150)
ALT SERPL W P-5'-P-CCNC: 27 U/L (ref 0–70)
ANION GAP SERPL CALCULATED.3IONS-SCNC: 14 MMOL/L (ref 7–15)
AST SERPL W P-5'-P-CCNC: 20 U/L (ref 0–45)
BILIRUB SERPL-MCNC: 0.6 MG/DL
BUN SERPL-MCNC: 26.2 MG/DL (ref 8–23)
CALCIUM SERPL-MCNC: 9.4 MG/DL (ref 8.8–10.2)
CHLORIDE SERPL-SCNC: 103 MMOL/L (ref 98–107)
CHOLEST SERPL-MCNC: 133 MG/DL
CREAT SERPL-MCNC: 1.08 MG/DL (ref 0.67–1.17)
CRP SERPL-MCNC: 72.5 MG/L
DEPRECATED HCO3 PLAS-SCNC: 23 MMOL/L (ref 22–29)
EGFRCR SERPLBLD CKD-EPI 2021: 76 ML/MIN/1.73M2
FASTING STATUS PATIENT QL REPORTED: NO
GLUCOSE SERPL-MCNC: 95 MG/DL (ref 70–99)
HDLC SERPL-MCNC: 40 MG/DL
LDLC SERPL CALC-MCNC: 82 MG/DL
MAGNESIUM SERPL-MCNC: 2.1 MG/DL (ref 1.7–2.3)
NONHDLC SERPL-MCNC: 93 MG/DL
POTASSIUM SERPL-SCNC: 3.9 MMOL/L (ref 3.4–5.3)
PROT SERPL-MCNC: 7.5 G/DL (ref 6.4–8.3)
RHEUMATOID FACT SERPL-ACNC: <10 IU/ML
SODIUM SERPL-SCNC: 140 MMOL/L (ref 135–145)
TRIGL SERPL-MCNC: 57 MG/DL

## 2024-05-07 LAB
BACTERIA BLD CULT: NO GROWTH
BACTERIA BLD CULT: NO GROWTH

## 2024-05-12 DIAGNOSIS — I10 ESSENTIAL HYPERTENSION: ICD-10-CM

## 2024-05-14 ENCOUNTER — TELEPHONE (OUTPATIENT)
Facility: CLINIC | Age: 64
End: 2024-05-14
Payer: COMMERCIAL

## 2024-05-14 RX ORDER — LISINOPRIL 20 MG/1
TABLET ORAL
Qty: 90 TABLET | Refills: 0 | Status: SHIPPED | OUTPATIENT
Start: 2024-05-14 | End: 2024-08-16

## 2024-05-14 NOTE — TELEPHONE ENCOUNTER
Left message for pt that we needed to reschedule his hospital follow up appt scheduled with Dr Gilliam on 5/15/24. Pt was scheduled in error at a time that doctor was unavailable. Please assist pt in scheduling when he returns call.

## 2024-05-18 ENCOUNTER — HEALTH MAINTENANCE LETTER (OUTPATIENT)
Age: 64
End: 2024-05-18

## 2024-05-23 ENCOUNTER — HOSPITAL ENCOUNTER (OUTPATIENT)
Facility: CLINIC | Age: 64
Setting detail: OBSERVATION
Discharge: HOME | End: 2024-05-24
Attending: EMERGENCY MEDICINE | Admitting: STUDENT IN AN ORGANIZED HEALTH CARE EDUCATION/TRAINING PROGRAM
Payer: COMMERCIAL

## 2024-05-23 ENCOUNTER — OFFICE VISIT (OUTPATIENT)
Dept: FAMILY MEDICINE | Facility: CLINIC | Age: 64
End: 2024-05-23
Payer: COMMERCIAL

## 2024-05-23 ENCOUNTER — APPOINTMENT (OUTPATIENT)
Dept: GENERAL RADIOLOGY | Facility: CLINIC | Age: 64
End: 2024-05-23
Attending: EMERGENCY MEDICINE
Payer: COMMERCIAL

## 2024-05-23 ENCOUNTER — ANCILLARY PROCEDURE (OUTPATIENT)
Dept: GENERAL RADIOLOGY | Facility: CLINIC | Age: 64
End: 2024-05-23
Attending: STUDENT IN AN ORGANIZED HEALTH CARE EDUCATION/TRAINING PROGRAM
Payer: COMMERCIAL

## 2024-05-23 VITALS
HEIGHT: 70 IN | OXYGEN SATURATION: 98 % | TEMPERATURE: 98 F | RESPIRATION RATE: 24 BRPM | WEIGHT: 186 LBS | BODY MASS INDEX: 26.63 KG/M2 | HEART RATE: 50 BPM | SYSTOLIC BLOOD PRESSURE: 130 MMHG | DIASTOLIC BLOOD PRESSURE: 80 MMHG

## 2024-05-23 DIAGNOSIS — R55 SYNCOPE AND COLLAPSE: ICD-10-CM

## 2024-05-23 DIAGNOSIS — M25.50 ARTHRALGIA, UNSPECIFIED JOINT: ICD-10-CM

## 2024-05-23 DIAGNOSIS — M79.10 MYALGIA: Primary | ICD-10-CM

## 2024-05-23 LAB
ALBUMIN SERPL BCG-MCNC: 3.7 G/DL (ref 3.5–5.2)
ALP SERPL-CCNC: 66 U/L (ref 40–150)
ALT SERPL W P-5'-P-CCNC: 27 U/L (ref 0–70)
ANION GAP SERPL CALCULATED.3IONS-SCNC: 12 MMOL/L (ref 7–15)
AST SERPL W P-5'-P-CCNC: 21 U/L (ref 0–45)
BASOPHILS # BLD AUTO: 0 10E3/UL (ref 0–0.2)
BASOPHILS NFR BLD AUTO: 1 %
BILIRUB SERPL-MCNC: 0.5 MG/DL
BUN SERPL-MCNC: 23.4 MG/DL (ref 8–23)
CALCIUM SERPL-MCNC: 8.8 MG/DL (ref 8.8–10.2)
CHLORIDE SERPL-SCNC: 107 MMOL/L (ref 98–107)
CREAT SERPL-MCNC: 0.91 MG/DL (ref 0.67–1.17)
DEPRECATED HCO3 PLAS-SCNC: 22 MMOL/L (ref 22–29)
EGFRCR SERPLBLD CKD-EPI 2021: >90 ML/MIN/1.73M2
EOSINOPHIL # BLD AUTO: 0.1 10E3/UL (ref 0–0.7)
EOSINOPHIL NFR BLD AUTO: 2 %
ERYTHROCYTE [DISTWIDTH] IN BLOOD BY AUTOMATED COUNT: 13.6 % (ref 10–15)
ERYTHROCYTE [SEDIMENTATION RATE] IN BLOOD BY WESTERGREN METHOD: 51 MM/HR (ref 0–20)
GLUCOSE SERPL-MCNC: 128 MG/DL (ref 70–99)
HCT VFR BLD AUTO: 32.5 % (ref 40–53)
HGB BLD-MCNC: 10.5 G/DL (ref 13.3–17.7)
HOLD SPECIMEN: NORMAL
IMM GRANULOCYTES # BLD: 0 10E3/UL
IMM GRANULOCYTES NFR BLD: 0 %
LYMPHOCYTES # BLD AUTO: 1.4 10E3/UL (ref 0.8–5.3)
LYMPHOCYTES NFR BLD AUTO: 23 %
MCH RBC QN AUTO: 28.8 PG (ref 26.5–33)
MCHC RBC AUTO-ENTMCNC: 32.3 G/DL (ref 31.5–36.5)
MCV RBC AUTO: 89 FL (ref 78–100)
MONOCYTES # BLD AUTO: 0.9 10E3/UL (ref 0–1.3)
MONOCYTES NFR BLD AUTO: 15 %
NEUTROPHILS # BLD AUTO: 3.6 10E3/UL (ref 1.6–8.3)
NEUTROPHILS NFR BLD AUTO: 59 %
NRBC # BLD AUTO: 0 10E3/UL
NRBC BLD AUTO-RTO: 0 /100
PLATELET # BLD AUTO: 208 10E3/UL (ref 150–450)
POTASSIUM SERPL-SCNC: 3.9 MMOL/L (ref 3.4–5.3)
PROT SERPL-MCNC: 6.4 G/DL (ref 6.4–8.3)
RBC # BLD AUTO: 3.64 10E6/UL (ref 4.4–5.9)
SODIUM SERPL-SCNC: 141 MMOL/L (ref 135–145)
TROPONIN T SERPL HS-MCNC: 11 NG/L
TROPONIN T SERPL HS-MCNC: 13 NG/L
WBC # BLD AUTO: 6.1 10E3/UL (ref 4–11)

## 2024-05-23 PROCEDURE — 80048 BASIC METABOLIC PNL TOTAL CA: CPT | Performed by: STUDENT IN AN ORGANIZED HEALTH CARE EDUCATION/TRAINING PROGRAM

## 2024-05-23 PROCEDURE — 84484 ASSAY OF TROPONIN QUANT: CPT | Mod: 91 | Performed by: EMERGENCY MEDICINE

## 2024-05-23 PROCEDURE — 85652 RBC SED RATE AUTOMATED: CPT | Performed by: STUDENT IN AN ORGANIZED HEALTH CARE EDUCATION/TRAINING PROGRAM

## 2024-05-23 PROCEDURE — 86140 C-REACTIVE PROTEIN: CPT | Performed by: STUDENT IN AN ORGANIZED HEALTH CARE EDUCATION/TRAINING PROGRAM

## 2024-05-23 PROCEDURE — 80053 COMPREHEN METABOLIC PANEL: CPT | Performed by: EMERGENCY MEDICINE

## 2024-05-23 PROCEDURE — 36415 COLL VENOUS BLD VENIPUNCTURE: CPT | Performed by: EMERGENCY MEDICINE

## 2024-05-23 PROCEDURE — 93010 ELECTROCARDIOGRAM REPORT: CPT | Performed by: EMERGENCY MEDICINE

## 2024-05-23 PROCEDURE — 96360 HYDRATION IV INFUSION INIT: CPT | Performed by: EMERGENCY MEDICINE

## 2024-05-23 PROCEDURE — 36415 COLL VENOUS BLD VENIPUNCTURE: CPT | Performed by: STUDENT IN AN ORGANIZED HEALTH CARE EDUCATION/TRAINING PROGRAM

## 2024-05-23 PROCEDURE — 73030 X-RAY EXAM OF SHOULDER: CPT | Mod: TC | Performed by: RADIOLOGY

## 2024-05-23 PROCEDURE — 120N000002 HC R&B MED SURG/OB UMMC

## 2024-05-23 PROCEDURE — 93005 ELECTROCARDIOGRAM TRACING: CPT | Performed by: EMERGENCY MEDICINE

## 2024-05-23 PROCEDURE — 99285 EMERGENCY DEPT VISIT HI MDM: CPT | Mod: 25 | Performed by: EMERGENCY MEDICINE

## 2024-05-23 PROCEDURE — 99285 EMERGENCY DEPT VISIT HI MDM: CPT | Performed by: EMERGENCY MEDICINE

## 2024-05-23 PROCEDURE — 85025 COMPLETE CBC W/AUTO DIFF WBC: CPT | Performed by: EMERGENCY MEDICINE

## 2024-05-23 PROCEDURE — 258N000003 HC RX IP 258 OP 636: Performed by: EMERGENCY MEDICINE

## 2024-05-23 PROCEDURE — 99214 OFFICE O/P EST MOD 30 MIN: CPT | Performed by: STUDENT IN AN ORGANIZED HEALTH CARE EDUCATION/TRAINING PROGRAM

## 2024-05-23 PROCEDURE — 71045 X-RAY EXAM CHEST 1 VIEW: CPT | Mod: 26 | Performed by: RADIOLOGY

## 2024-05-23 PROCEDURE — 71045 X-RAY EXAM CHEST 1 VIEW: CPT

## 2024-05-23 PROCEDURE — 82550 ASSAY OF CK (CPK): CPT | Performed by: STUDENT IN AN ORGANIZED HEALTH CARE EDUCATION/TRAINING PROGRAM

## 2024-05-23 RX ORDER — PREDNISONE 50 MG/1
50 TABLET ORAL DAILY
Qty: 14 TABLET | Refills: 0 | Status: SHIPPED | OUTPATIENT
Start: 2024-05-23 | End: 2024-06-06

## 2024-05-23 RX ORDER — ONDANSETRON 2 MG/ML
4 INJECTION INTRAMUSCULAR; INTRAVENOUS EVERY 30 MIN PRN
Status: DISCONTINUED | OUTPATIENT
Start: 2024-05-23 | End: 2024-05-24

## 2024-05-23 RX ADMIN — SODIUM CHLORIDE 1000 ML: 9 INJECTION, SOLUTION INTRAVENOUS at 21:28

## 2024-05-23 ASSESSMENT — COLUMBIA-SUICIDE SEVERITY RATING SCALE - C-SSRS
1. IN THE PAST MONTH, HAVE YOU WISHED YOU WERE DEAD OR WISHED YOU COULD GO TO SLEEP AND NOT WAKE UP?: NO
2. HAVE YOU ACTUALLY HAD ANY THOUGHTS OF KILLING YOURSELF IN THE PAST MONTH?: NO
6. HAVE YOU EVER DONE ANYTHING, STARTED TO DO ANYTHING, OR PREPARED TO DO ANYTHING TO END YOUR LIFE?: NO

## 2024-05-23 ASSESSMENT — PAIN SCALES - GENERAL: PAINLEVEL: MILD PAIN (3)

## 2024-05-23 ASSESSMENT — ACTIVITIES OF DAILY LIVING (ADL)
ADLS_ACUITY_SCORE: 35

## 2024-05-23 NOTE — PROGRESS NOTES
ER Follow-up Visit      Assessment and Plan   64-year-old male with past medical history of CAD hyperlipidemia who presents after ER visit for worsening arthralgias and myalgias.    1. Myalgia  Unclear etiology. Possible viral myopathy but no hx of infection preceding. The following labs will be checked.  - predniSONE (DELTASONE) 50 MG tablet; Take 1 tablet (50 mg) by mouth daily for 14 days  Dispense: 14 tablet; Refill: 0  - CK total; Future  - CK total    Addendum 5/29/24:  After reviewing patient results and chart further I think stain induced myopathy is also a possibility. I recommended he stop his lipitor temporarily as well and see if this could improve his symptoms    2. Arthralgia, unspecified joint  Patient with multiple joint arthralgias in his shoulders most severely.  He feels this is more muscular etiology but difficult to say.  On exam today he has full range of motion both actively and passively in his shoulders and really no redness or swelling objectively.  He has pain with some maneuvers including his Kenney and drop can but is diffusely painful with many movements.  He is quite stiff on examination mostly due to pain.  Differential includes osteoarthritis, reactive arthritis, and rheumatoid arthritis is most likely etiologies although many in the differential.  Difficult as really does not have many supporting findings for many of these.  He did have some elevated inflammatory markers in the ER including CRP and ESR.  I think he requires further workup and we will repeat these today along with x-rays of his shoulders.  May consider MRIs of his shoulders and hips possibly low back.  He has appointment with rheumatology and I think this will be most helpful unfortunate this is not till September. Patient was counseled to continue taking OTC acetaminophen and ibuprofen.  - ESR: Erythrocyte sedimentation rate; Future  - CRP, inflammation; Future  - XR Shoulder Bilateral G/E 2 Views; Future  -  Basic metabolic panel  (Ca, Cl, CO2, Creat, Gluc, K, Na, BUN); Future  - ESR: Erythrocyte sedimentation rate  - CRP, inflammation  - Basic metabolic panel  (Ca, Cl, CO2, Creat, Gluc, K, Na, BUN)        Options for treatment and follow-up care were reviewed with the patient engaged in the decision making process and verbalized understanding of the options discussed and agreed with the final plan.    Isauro Malik MD           [facility]       ER Follow-up Visit:    ER: [facility] Emergency Department    Date of Visit: 5/2/2024    Reason(s) for Presentation: Diffuse joint pain    Diagnostic Tests:  ESR, CRP, CK    Summary of ER visit copied below/Treatments Recieved:   Patient is a 64-year-old gentleman who presents with bilateral hip pain, back pain, neck pain, left shoulder and elbow pain.  Symptoms have been progressing over the last couple of weeks, after getting cortisone injection in bilateral hips.  He had difficulty getting out of bed due to hip pain this morning and came in.  He has normal blood pressure, temperature 39.2, normal heart rate and respiratory rate.  Nontoxic, but uncomfortable appearing.  Able to passively range his extremities with some pain but he is difficulty with active range of motion secondary to pain.  The joints are not swollen, erythematous or warm.  He has no overlying rash.  Differential includes polyarthritis, osteoarthritis, rheumatoid versus polymyalgia rheumatica.  I also ordered screening labs for Lyme disease.  He was given steroids here and felt significantly improved, was able to flex at the hips easily.     I also spoke to rheumatology, and recommended to add on some labs which I did.  I placed an outpatient referral.     His workup here showed an elevated CRP of 72.3, CK was normal, ESR elevated at 58.  He is normal white blood cell count, negative viral panel, normal BMP.     Blood culture is collected, unlikely to be positive given his overall picture.     Concerns  "today:     Chief Complaint   Patient presents with    Hospital F/U     Joint pain extreme all over body pain-Polyarthritis         Patient has been diagnosed with stage 4 osteoarthritis in his hip, for which he is seeing an orthopedic specialist at [facility] and has a hip replacement scheduled in July. Patient's pain has been generally well-controlled due to joint injections.  For us today he describes worsening multiple joint pain in his buttocks, shoulders and into the muscles of his bicep and elbows along with his hamstrings/buttocks.  Really an acute onset over the last several weeks with no inciting incident other than possibly corticosteroid injection that seem to proceed this a couple days prior.  He denies a history of redness or swelling of his joints.  Very severe when he went to the ER and has slowly improved since.  He notes that prednisone made him feel almost completely better.  He denies any viral-like illness before this.                Review of Systems:     Complete review of systems is negative except as noted in the HPI            Physical Exam:   /80   Pulse 50   Temp 98  F (36.7  C)   Resp 24   Ht 1.77 m (5' 9.69\")   Wt 84.4 kg (186 lb)   SpO2 98%   BMI 26.93 kg/m      General appearance: Alert, cooperative, no distress, appears stated age  Head: Normocephalic, atraumatic, without obvious abnormality  Extremities: Reduced passive range of motion in both hips and shoulders bilaterally due to pain. Crepitance noted on passive range of motion on right hip. Mild tenderness to palpation of the lateral aspect of the right bicep.   Skin: Skin color, texture, turgor normal no rashes or lesions on limited skin exam.     "

## 2024-05-24 ENCOUNTER — ORDERS ONLY (AUTO-RELEASED) (OUTPATIENT)
Dept: EMERGENCY MEDICINE | Facility: CLINIC | Age: 64
End: 2024-05-24
Payer: COMMERCIAL

## 2024-05-24 VITALS
DIASTOLIC BLOOD PRESSURE: 66 MMHG | OXYGEN SATURATION: 97 % | TEMPERATURE: 98.5 F | HEIGHT: 71 IN | HEART RATE: 70 BPM | SYSTOLIC BLOOD PRESSURE: 134 MMHG | BODY MASS INDEX: 26.04 KG/M2 | WEIGHT: 186 LBS | RESPIRATION RATE: 16 BRPM

## 2024-05-24 DIAGNOSIS — R55 SYNCOPE AND COLLAPSE: ICD-10-CM

## 2024-05-24 LAB
ALBUMIN SERPL BCG-MCNC: 3.3 G/DL (ref 3.5–5.2)
ALP SERPL-CCNC: 66 U/L (ref 40–150)
ALT SERPL W P-5'-P-CCNC: 22 U/L (ref 0–70)
ANION GAP SERPL CALCULATED.3IONS-SCNC: 10 MMOL/L (ref 7–15)
ANION GAP SERPL CALCULATED.3IONS-SCNC: 11 MMOL/L (ref 7–15)
AST SERPL W P-5'-P-CCNC: 19 U/L (ref 0–45)
ATRIAL RATE - MUSE: 79 BPM
BASOPHILS # BLD AUTO: 0 10E3/UL (ref 0–0.2)
BASOPHILS NFR BLD AUTO: 0 %
BILIRUB SERPL-MCNC: 0.3 MG/DL
BUN SERPL-MCNC: 18.4 MG/DL (ref 8–23)
BUN SERPL-MCNC: 25.6 MG/DL (ref 8–23)
CALCIUM SERPL-MCNC: 8.8 MG/DL (ref 8.8–10.2)
CALCIUM SERPL-MCNC: 9.3 MG/DL (ref 8.8–10.2)
CHLORIDE SERPL-SCNC: 105 MMOL/L (ref 98–107)
CHLORIDE SERPL-SCNC: 108 MMOL/L (ref 98–107)
CK SERPL-CCNC: 70 U/L (ref 39–308)
CREAT SERPL-MCNC: 0.82 MG/DL (ref 0.67–1.17)
CREAT SERPL-MCNC: 0.9 MG/DL (ref 0.67–1.17)
CRP SERPL-MCNC: 57.9 MG/L
DEPRECATED HCO3 PLAS-SCNC: 22 MMOL/L (ref 22–29)
DEPRECATED HCO3 PLAS-SCNC: 22 MMOL/L (ref 22–29)
DIASTOLIC BLOOD PRESSURE - MUSE: NORMAL MMHG
EGFRCR SERPLBLD CKD-EPI 2021: >90 ML/MIN/1.73M2
EGFRCR SERPLBLD CKD-EPI 2021: >90 ML/MIN/1.73M2
EOSINOPHIL # BLD AUTO: 0.2 10E3/UL (ref 0–0.7)
EOSINOPHIL NFR BLD AUTO: 2 %
ERYTHROCYTE [DISTWIDTH] IN BLOOD BY AUTOMATED COUNT: 13.7 % (ref 10–15)
GLUCOSE SERPL-MCNC: 112 MG/DL (ref 70–99)
GLUCOSE SERPL-MCNC: 91 MG/DL (ref 70–99)
HCT VFR BLD AUTO: 31.5 % (ref 40–53)
HGB BLD-MCNC: 10.1 G/DL (ref 13.3–17.7)
IMM GRANULOCYTES # BLD: 0 10E3/UL
IMM GRANULOCYTES NFR BLD: 0 %
INTERPRETATION ECG - MUSE: NORMAL
LYMPHOCYTES # BLD AUTO: 0.8 10E3/UL (ref 0.8–5.3)
LYMPHOCYTES NFR BLD AUTO: 11 %
MCH RBC QN AUTO: 29.1 PG (ref 26.5–33)
MCHC RBC AUTO-ENTMCNC: 32.1 G/DL (ref 31.5–36.5)
MCV RBC AUTO: 91 FL (ref 78–100)
MONOCYTES # BLD AUTO: 0.9 10E3/UL (ref 0–1.3)
MONOCYTES NFR BLD AUTO: 12 %
NEUTROPHILS # BLD AUTO: 5.5 10E3/UL (ref 1.6–8.3)
NEUTROPHILS NFR BLD AUTO: 75 %
NRBC # BLD AUTO: 0 10E3/UL
NRBC BLD AUTO-RTO: 0 /100
P AXIS - MUSE: 56 DEGREES
PLATELET # BLD AUTO: 182 10E3/UL (ref 150–450)
POTASSIUM SERPL-SCNC: 3.8 MMOL/L (ref 3.4–5.3)
POTASSIUM SERPL-SCNC: 4.4 MMOL/L (ref 3.4–5.3)
PR INTERVAL - MUSE: 170 MS
PROT SERPL-MCNC: 5.8 G/DL (ref 6.4–8.3)
QRS DURATION - MUSE: 92 MS
QT - MUSE: 388 MS
QTC - MUSE: 444 MS
R AXIS - MUSE: 29 DEGREES
RBC # BLD AUTO: 3.47 10E6/UL (ref 4.4–5.9)
SODIUM SERPL-SCNC: 138 MMOL/L (ref 135–145)
SODIUM SERPL-SCNC: 140 MMOL/L (ref 135–145)
SYSTOLIC BLOOD PRESSURE - MUSE: NORMAL MMHG
T AXIS - MUSE: 14 DEGREES
TROPONIN T SERPL HS-MCNC: 13 NG/L
VENTRICULAR RATE- MUSE: 79 BPM
WBC # BLD AUTO: 7.5 10E3/UL (ref 4–11)

## 2024-05-24 PROCEDURE — 99239 HOSP IP/OBS DSCHRG MGMT >30: CPT

## 2024-05-24 PROCEDURE — 85025 COMPLETE CBC W/AUTO DIFF WBC: CPT

## 2024-05-24 PROCEDURE — 36415 COLL VENOUS BLD VENIPUNCTURE: CPT

## 2024-05-24 PROCEDURE — 250N000013 HC RX MED GY IP 250 OP 250 PS 637: Performed by: EMERGENCY MEDICINE

## 2024-05-24 PROCEDURE — 82040 ASSAY OF SERUM ALBUMIN: CPT

## 2024-05-24 PROCEDURE — 84484 ASSAY OF TROPONIN QUANT: CPT

## 2024-05-24 PROCEDURE — 250N000013 HC RX MED GY IP 250 OP 250 PS 637

## 2024-05-24 PROCEDURE — 250N000012 HC RX MED GY IP 250 OP 636 PS 637

## 2024-05-24 RX ORDER — PROCHLORPERAZINE MALEATE 5 MG
5 TABLET ORAL EVERY 6 HOURS PRN
Status: DISCONTINUED | OUTPATIENT
Start: 2024-05-24 | End: 2024-05-24 | Stop reason: HOSPADM

## 2024-05-24 RX ORDER — AMOXICILLIN 250 MG
2 CAPSULE ORAL 2 TIMES DAILY PRN
Status: DISCONTINUED | OUTPATIENT
Start: 2024-05-24 | End: 2024-05-24 | Stop reason: HOSPADM

## 2024-05-24 RX ORDER — METOPROLOL SUCCINATE 25 MG/1
25 TABLET, EXTENDED RELEASE ORAL DAILY
Status: DISCONTINUED | OUTPATIENT
Start: 2024-05-24 | End: 2024-05-24 | Stop reason: HOSPADM

## 2024-05-24 RX ORDER — EZETIMIBE 10 MG/1
10 TABLET ORAL DAILY
Status: DISCONTINUED | OUTPATIENT
Start: 2024-05-24 | End: 2024-05-24 | Stop reason: HOSPADM

## 2024-05-24 RX ORDER — PROCHLORPERAZINE 25 MG
12.5 SUPPOSITORY, RECTAL RECTAL EVERY 12 HOURS PRN
Status: DISCONTINUED | OUTPATIENT
Start: 2024-05-24 | End: 2024-05-24 | Stop reason: HOSPADM

## 2024-05-24 RX ORDER — IBUPROFEN 600 MG/1
600 TABLET, FILM COATED ORAL 3 TIMES DAILY
Status: DISCONTINUED | OUTPATIENT
Start: 2024-05-24 | End: 2024-05-24 | Stop reason: HOSPADM

## 2024-05-24 RX ORDER — ONDANSETRON 2 MG/ML
4 INJECTION INTRAMUSCULAR; INTRAVENOUS EVERY 6 HOURS PRN
Status: DISCONTINUED | OUTPATIENT
Start: 2024-05-24 | End: 2024-05-24 | Stop reason: HOSPADM

## 2024-05-24 RX ORDER — ONDANSETRON 4 MG/1
4 TABLET, ORALLY DISINTEGRATING ORAL EVERY 6 HOURS PRN
Status: DISCONTINUED | OUTPATIENT
Start: 2024-05-24 | End: 2024-05-24 | Stop reason: HOSPADM

## 2024-05-24 RX ORDER — LIDOCAINE 40 MG/G
CREAM TOPICAL
Status: DISCONTINUED | OUTPATIENT
Start: 2024-05-24 | End: 2024-05-24 | Stop reason: HOSPADM

## 2024-05-24 RX ORDER — MULTIPLE VITAMINS W/ MINERALS TAB 9MG-400MCG
1 TAB ORAL DAILY
Status: DISCONTINUED | OUTPATIENT
Start: 2024-05-24 | End: 2024-05-24 | Stop reason: HOSPADM

## 2024-05-24 RX ORDER — ASPIRIN 81 MG/1
81 TABLET ORAL DAILY
Status: DISCONTINUED | OUTPATIENT
Start: 2024-05-24 | End: 2024-05-24 | Stop reason: HOSPADM

## 2024-05-24 RX ORDER — POLYETHYLENE GLYCOL 3350 17 G/17G
17 POWDER, FOR SOLUTION ORAL 2 TIMES DAILY PRN
Status: DISCONTINUED | OUTPATIENT
Start: 2024-05-24 | End: 2024-05-24 | Stop reason: HOSPADM

## 2024-05-24 RX ORDER — ATORVASTATIN CALCIUM 40 MG/1
80 TABLET, FILM COATED ORAL DAILY
Status: DISCONTINUED | OUTPATIENT
Start: 2024-05-24 | End: 2024-05-24 | Stop reason: HOSPADM

## 2024-05-24 RX ORDER — AMOXICILLIN 250 MG
1 CAPSULE ORAL 2 TIMES DAILY PRN
Status: DISCONTINUED | OUTPATIENT
Start: 2024-05-24 | End: 2024-05-24 | Stop reason: HOSPADM

## 2024-05-24 RX ORDER — CALCIUM CARBONATE 500 MG/1
1000 TABLET, CHEWABLE ORAL 4 TIMES DAILY PRN
Status: DISCONTINUED | OUTPATIENT
Start: 2024-05-24 | End: 2024-05-24 | Stop reason: HOSPADM

## 2024-05-24 RX ORDER — OXYCODONE HYDROCHLORIDE 5 MG/1
5-10 TABLET ORAL
Status: DISCONTINUED | OUTPATIENT
Start: 2024-05-24 | End: 2024-05-24 | Stop reason: HOSPADM

## 2024-05-24 RX ORDER — LISINOPRIL 5 MG/1
20 TABLET ORAL DAILY
Status: DISCONTINUED | OUTPATIENT
Start: 2024-05-24 | End: 2024-05-24 | Stop reason: HOSPADM

## 2024-05-24 RX ORDER — ACETAMINOPHEN 325 MG/1
650 TABLET ORAL EVERY 4 HOURS PRN
Status: DISCONTINUED | OUTPATIENT
Start: 2024-05-24 | End: 2024-05-24 | Stop reason: HOSPADM

## 2024-05-24 RX ADMIN — EZETIMIBE 10 MG: 10 TABLET ORAL at 07:40

## 2024-05-24 RX ADMIN — ACETAMINOPHEN, ASPIRIN, CAFFEINE 1 TABLET: 250; 65; 250 TABLET, FILM COATED ORAL at 05:29

## 2024-05-24 RX ADMIN — Medication 1 TABLET: at 07:39

## 2024-05-24 RX ADMIN — PREDNISONE 50 MG: 5 TABLET ORAL at 07:39

## 2024-05-24 RX ADMIN — ATORVASTATIN CALCIUM 80 MG: 40 TABLET, FILM COATED ORAL at 07:39

## 2024-05-24 RX ADMIN — LISINOPRIL 20 MG: 5 TABLET ORAL at 07:39

## 2024-05-24 RX ADMIN — ASPIRIN 81 MG: 81 TABLET ORAL at 07:39

## 2024-05-24 RX ADMIN — METOPROLOL SUCCINATE 25 MG: 25 TABLET, EXTENDED RELEASE ORAL at 07:39

## 2024-05-24 ASSESSMENT — ACTIVITIES OF DAILY LIVING (ADL)
ADLS_ACUITY_SCORE: 35

## 2024-05-24 NOTE — ED TRIAGE NOTES
ED ARRIVAL NOTE - RN      Chief Complaint: BIBA from [facility] [employer] event, dizzy, syncopal, no LOC. One beer, PO THC. Vomiting on arrival      EMS:     EMS Blood Sugar: 105  EMS PIV: 18 g PIV  Special Assessment(s): T wave depression      Linnea Farmer RN  May 23, 2024

## 2024-05-24 NOTE — H&P
[facility]    Cardiology History and Physical -  Cards 1         Date of Admission:  5/23/2024    Assessment & Plan: HVSL    Minor Murcia is a 64 year old male with PMHx CAD s/p PCI to distal RCA and mid LAD (2005) c/b ISR s/p repeat PCI of prox/mid LAD (2017), HTN, and polyarthritis who is admitted for syncope.    Vasovagal syncope vs orthostatic hypotension  Patient presented after syncopal episode while at [employer]. Had taken an edible (10 mg THC) and drank beer at this event. Noted to have lightheadedness upon standing from a bench after which he lost consciousness. Only other associated symptom was nausea before and after the event. Denies any peceding chest pain, palpitaitons, SOB. Denies ARREAGA, chest pain at rest or on exertion, edema, orthopnea, orthostatic symptoms, vertigo, lightheadedness at baseline. Initial MI symptoms (2005) were chest and jaw pain. Has had syncopal episodes in the past. Saw cardiology for syncope associated with palpitations in 2022.Underwent stress testing which was normal. Holter monitor also done 8/4/22 and without significant atrial or ventricular arrhythmias.  - Tele monitoring  - S/p 1 L NS in ED; no indication for further fluids  - Obtain orthostatic vitals  - Consider zio patch on discharge    CAD s/p PCI to distal RCA and mid LAD (2005) c/b ISR s/p PCI of prox/mid LAD (2017)  HTN  Stress echo 9/26/22 normal without evidence of ischemia. Angiogram 4/14/2017 minimal LM disease, 90% prox/mid LAD ISR, mild LCX disease, mild RCA disease.  - Continue PTA lipitor 80 mg daily  - Continue PTA zetia 10 mg daily  - Continue PTA toprol 25 mg daily  - Continue PTA lisinopril 20 mg daily    Polyarthritis  Currently being worked up for severe arthralgias in numerous joints. On prednisone taper. Following with [facility].  - Continue PTA prednisone 50 mg daily per taper  - Hold PTA ibuprofen 600 mg TID if possible (with CAD  "patient should consider alternative)  - PRN tylenol for pain       Diet: Combination Diet Low Saturated Fat Na <2400mg Diet    DVT Prophylaxis: Ambulate every shift  Potts Catheter: Not present  Cardiac Monitoring: ACTIVE order. Indication: Syncope- low cardiac risk (24 hours)  Code Status: Full Code          Clinically Significant Risk Factors Present on Admission                # Drug Induced Platelet Defect: home medication list includes an antiplatelet medication   # Hypertension: Home medication list includes antihypertensive(s)      # Overweight: Estimated body mass index is 25.94 kg/m  as calculated from the following:    Height as of this encounter: 1.803 m (5' 11\").    Weight as of this encounter: 84.4 kg (186 lb).           To be formally staffed in the AM.    Vibha Fernandez MD  [facility]    ______________________________________________________________________    Chief Complaint   syncope    History is obtained from the patient    History of Present Illness   Minor Murcia is a 64 year old male with PMHx CAD s/p PCI to distal RCA and mid LAD (2005) c/b ISR s/p repeat PCI of prox/mid LAD (2017), HTN, and polyarthritis who is admitted for syncope.  [place]. He had felt normal throughout the day but became nauseous and clammy while standing so they sat down. When going from a seated to standing position he felt lightheaded and subsequently lost consciousness. His son caught him so there was no head trauma. He woke up shortly afterwards. He remembers the preceding events. Denies any changes to his oral intake but was drinking beer and had an edible (10 mg THC) prior to event. Denies chest pain, palpitations, dyspnea prior to the episode. Also denies dyspnea, chest pain, or palpitations at baseline. Able to function normally and exert himself without issues. Has had episodes of syncope in the past- the most recent " being in 2022 which was associated with palpitations. Workup at that time unrevealing. Underwent stress echo which was negative for ischemia. Holter monitor did no demonstrate nonsustained or sustained atrial or ventricular tachyarrhythmias.      Past Medical History    Past Medical History:   Diagnosis Date    Coronary artery disease     High cholesterol     Hypertension     Myocardial infarction (H) 2005       Past Surgical History   Past Surgical History:   Procedure Laterality Date    CARDIAC CATHETERIZATION      CORONARY STENT PLACEMENT  2005    OK CATH PLACEMENT & NJX CORONARY ART ANGIO IMG S&I N/A 4/14/2017    Procedure: Coronary Angiogram;  Surgeon: Roverto Hollis MD;  Location: [facility] Cath Lab;  Service: Cardiology    OK CATH PLMT L HRT & ARTS W/NJX & ANGIO IMG S&I Left 4/14/2017    Procedure: Left Heart Catheterization Without Left Ventriculogram;  Surgeon: Roverto Hollis MD;  Location: [facility] Cath Lab;  Service: Cardiology       Prior to Admission Medications   Prior to Admission Medications   Prescriptions Last Dose Informant Patient Reported? Taking?   Ibuprofen (ADVIL PO)   Yes No   Sig: Take 600 mg by mouth 3 times daily   acetaminophen (TYLENOL) 160 MG TBDP   Yes No   Sig: Take 500 mg by mouth 2 times daily   aspirin 81 MG EC tablet   Yes No   Sig: [ASPIRIN 81 MG EC TABLET] Take 81 mg by mouth daily.   atorvastatin (LIPITOR) 80 MG tablet   No No   Sig: TAKE 1 TABLET(80 MG) BY MOUTH DAILY   ezetimibe (ZETIA) 10 MG tablet   No No   Sig: Take 1 tablet (10 mg) by mouth daily   lisinopril (ZESTRIL) 20 MG tablet   No No   Sig: TAKE 1 TABLET(20 MG) BY MOUTH DAILY   metoprolol succinate ER (TOPROL XL) 50 MG 24 hr tablet   No No   Sig: Take 0.5 tablets (25 mg) by mouth daily   multivitamin (CENTRUM SILVER) tablet   Yes No   Sig: Take 1 tablet by mouth daily   nitroglycerin (NITROSTAT) 0.4 MG SL tablet   No No   Sig: [NITROGLYCERIN (NITROSTAT) 0.4 MG SL TABLET] ONE TABLET UNDER TONGUE AS NEEDED FOR  CHEST PAIN MAY REPEAT EVERY 5 MINUTES X 2. IF NO RELIEF DIAL 911   oxyCODONE (ROXICODONE) 5 MG tablet   No No   Sig: Take 1-2 tablets (5-10 mg) by mouth nightly as needed for severe pain   predniSONE (DELTASONE) 50 MG tablet   No No   Sig: Take 1 tablet (50 mg) by mouth daily for 14 days   sildenafil (VIAGRA) 50 MG tablet   No No   Sig: Take 1 tablet (50 mg) by mouth daily as needed (ED)      Facility-Administered Medications: None        Review of Systems    The 10 point Review of Systems is negative other than noted in the HPI or here.      Physical Exam   Vital Signs: Temp: 98.4  F (36.9  C) Temp src: Axillary BP: (!) 153/79 Pulse: 76   Resp: 16 SpO2: 93 % O2 Device: None (Room air)    Weight: 186 lbs 0 oz    Constitutional: NAD, resting comfortably in bed  HEENT: EOMI, sclera anicteric, MMM, NCAT  Respiratory: CTAB, regular rate and effort  Cardiovascular: regular rate and rhythm  GI: NTND  Skin: no rash/lesions  Musculoskeletal: no peripheral edema  Neurologic: AAOx3    Medical Decision Making       Please see A&P for additional details of medical decision making.      Data     I have personally reviewed the following data over the past 24 hrs:    6.1  \   10.5 (L)   / 208     141 107 23.4 (H) /  128 (H)   3.9 22 0.91 \     ALT: 27 AST: 21 AP: 66 TBILI: 0.5   ALB: 3.7 TOT PROTEIN: 6.4 LIPASE: N/A     Trop: 13 BNP: N/A       Imaging results reviewed over the past 24 hrs:   Recent Results (from the past 24 hour(s))   XR Shoulder Bilateral G/E 2 Views    Narrative    EXAM: XR SHOULDER BILATERAL G/E 2 VIEWS  LOCATION: [facility]  DATE: 5/23/2024    INDICATION:  Arthralgia, unspecified joint  COMPARISON: None.      Impression    IMPRESSION:   Degenerative change at the glenohumeral joints bilaterally with osteophytic spurring and joint space narrowing. Mild hypertrophic change at the AC joint bilaterally. Both shoulders negative for fracture or dislocation.    XR Chest Port 1 View    Narrative     EXAM: XR CHEST PORT 1 VIEW  LOCATION: [facility]  DATE: 5/23/2024    INDICATION: syncope  COMPARISON: CT 4/4/2017      Impression    IMPRESSION: Negative chest.

## 2024-05-24 NOTE — DISCHARGE SUMMARY
[address] [address]  p: [phone]    Discharge Summary: Cardiology Service    Minor Murcia MRN# [id number]   YOB: 1960 Age: 64 year old       Admission Date: 5/23/2024  Discharge Date: 05/24/24    Discharge Diagnoses:  # Vasovagal syncope vs orthostatic hypotension  # CAD s/p PCI to distal RCA and mid LAD (2005) c/b ISR s/p PCI of prox/mid LAD (2017)  # HTN  # Polyarthritis    Brief HPI:  Minor Murcia is a 64 year old male with PMHx CAD s/p PCI to distal RCA and mid LAD (2005) c/b ISR s/p repeat PCI of prox/mid LAD (2017), HTN, and polyarthritis who is admitted for syncope.    Hospital Course by Diagnosis:  # Vasovagal syncope vs orthostatic hypotension  Patient presented after syncopal episode while at [employer]. Had taken an edible (10 mg THC) and drank beer at this event. Noted to have lightheadedness upon standing from a bench after which he lost consciousness. Only other associated symptom was nausea before and after the event. Denies any preceding chest pain, palpitaitons, SOB. Denies ARREAGA, chest pain at rest or on exertion, edema, orthopnea, orthostatic symptoms, vertigo, lightheadedness at baseline. Initial MI symptoms (2005) were chest and jaw pain. Has had syncopal episodes in the past. Saw cardiology for syncope associated with palpitations in 2022.Underwent stress testing which was normal. Holter monitor also done 8/4/22 and without significant atrial or ventricular arrhythmias. Patient received 1 L NS in ED. Orthostatic vitals were stable in ED.  - 2 week zio monitor at discharge  - Follow up with cardiology as scheduled     # CAD s/p PCI to distal RCA and mid LAD (2005) c/b ISR s/p PCI of prox/mid LAD (2017)  # HTN  Stress echo 9/26/22 normal without evidence of ischemia. Angiogram 4/14/2017 minimal LM disease, 90% prox/mid LAD ISR, mild LCX disease, mild RCA disease.  - Continue PTA lipitor 80 mg daily  - Continue  PTA zetia 10 mg daily  - Continue PTA toprol 25 mg daily  - Continue PTA lisinopril 20 mg daily     # Polyarthritis  Currently being worked up for severe arthralgias in numerous joints. On prednisone taper. Following with [facility].  - Continue PTA prednisone 50 mg daily per taper  - Resume PTA pain management regimen  - PRN tylenol for pain       Pertinent Procedures:  1. None    Consults:  None    Medication Changes:  None    Discharge medications:   Discharge Medication List as of 5/24/2024  7:59 AM        CONTINUE these medications which have NOT CHANGED    Details   acetaminophen (TYLENOL) 160 MG TBDP Take 500 mg by mouth 2 times daily as needed for mild pain, Historical      aspirin 81 MG EC tablet [ASPIRIN 81 MG EC TABLET] Take 81 mg by mouth daily., Historical      atorvastatin (LIPITOR) 80 MG tablet TAKE 1 TABLET(80 MG) BY MOUTH DAILY, Disp-90 tablet, R-2, E-Prescribe      ezetimibe (ZETIA) 10 MG tablet Take 1 tablet (10 mg) by mouth daily, Disp-90 tablet, R-3, E-Prescribe      Ibuprofen (ADVIL PO) Take 600 mg by mouth 3 times daily as needed for moderate pain, Historical      lisinopril (ZESTRIL) 20 MG tablet TAKE 1 TABLET(20 MG) BY MOUTH DAILY, Disp-90 tablet, R-0, E-Prescribe      metoprolol succinate ER (TOPROL XL) 50 MG 24 hr tablet Take 0.5 tablets (25 mg) by mouth daily, Disp-45 tablet, R-0, E-Prescribe      multivitamin (CENTRUM SILVER) tablet Take 1 tablet by mouth daily, Historical      nitroglycerin (NITROSTAT) 0.4 MG SL tablet [NITROGLYCERIN (NITROSTAT) 0.4 MG SL TABLET] ONE TABLET UNDER TONGUE AS NEEDED FOR CHEST PAIN MAY REPEAT EVERY 5 MINUTES X 2. IF NO RELIEF DIAL 911, Disp-25 tablet, R-1, Normal      oxyCODONE (ROXICODONE) 5 MG tablet Take 1-2 tablets (5-10 mg) by mouth nightly as needed for severe pain, Disp-14 tablet, R-0, E-Prescribe      predniSONE (DELTASONE) 50 MG tablet Take 1 tablet (50 mg) by mouth daily for 14 days, Disp-14 tablet, R-0, E-Prescribe      sildenafil  (VIAGRA) 50 MG tablet Take 1 tablet (50 mg) by mouth daily as needed (ED), Disp-30 tablet, R-1, E-Prescribe             Follow-up:  Cardiology 6/12 with Dr. Ro    Labs or imaging requiring follow-up after discharge:  2 week Zio monitor    Code status:  Full Code     Condition on discharge  Temp:  [98  F (36.7  C)-98.5  F (36.9  C)] 98.5  F (36.9  C)  Pulse:  [50-76] 70  Resp:  [16-24] 16  BP: (130-153)/(66-80) 134/66  SpO2:  [93 %-98 %] 97 %    General: Alert, interactive, NAD  Eyes: sclera anicteric, EOMI  Cardiovascular: regular rate and rhythm, normal S1 and S2, no murmurs, gallops, or rubs  Resp: clear to auscultation bilaterally, no rales, wheezes, or rhonchi  GI: Soft, nontender, nondistended. +BS.    Extremities: no edema, no cyanosis or clubbing  Skin: Warm and dry, no jaundice or rash  Neuro:  moves all extremities equally  Psych: Alert & oriented x 3    Imaging with results:        Patient Care Team:  Valentino Lu MD as PCP - General (Family Practice)  Valentino Lu MD as Assigned PCP  Julio César Ro DO as Physician (Cardiovascular Disease)    Veronique Cervantes MS, PA-C  [facility]  Patient discussed with staff cardiologist, Dr. Acuna, who agrees with the above documentation and plan. Documentation represents joint decision making.     Time Spent on this Encounter   I, Veronique Cervantes PA-C, personally saw the patient today and spent greater than 30 minutes discharging this patient.

## 2024-05-24 NOTE — ED NOTES
Bed: ED15  Expected date:   Expected time:   Means of arrival:   Comments:  [id number]  Yellow  64 male  12lead depression in 3 leads 160/62

## 2024-05-24 NOTE — RESULT ENCOUNTER NOTE
Minor Murcia  Your results from your recent clinic visit show:  Your x-ray showed some arthritis in the shoulders. So possibly contributing but does not explain your pain everywhere else. I will await your blood tests and then consider the MRI's    If you have more questions please call the clinic at [phone] or send me a [employer] message    Dr. Isauro Malik

## 2024-05-24 NOTE — ED PROVIDER NOTES
[facility] ([facility])    5/23/24       ED PROVIDER NOTE    History     Chief Complaint   Patient presents with    Syncope     HPI  Minor Murcia is a 64 year old male PMH notable for MI s/p stenting, CAD, ASCVD, syncopal episodes, polyarthritis, HTN and HLD who presents to the ED via EMS following a syncopal episode. His wife reports they were at an event at the [facility] grounds when he started to look gray and said he didn't feel good, so they sat down on a bench. He then lost consciousness and was caught by his son, so no head impact. It was not exceptionally hot outside. He denies any confusion. His wife reports 2 past episodes of syncope, usually in large, crowded areas, with no abnormal symptoms. With his previous MI he had symptoms of jaw pain, today he did not have this. No chest pain or pressure, jaw pain, SOB, recent illness, or N/V/D. No history of heart rhythm problems. He took his meds today. About 5 weeks ago he began having severe pain in his bilateral neck, shoulders, elbows, and hamstring, worse on right side. He has been taking prednisone for this. Has had similar syncopal events in the past (3 times). Not related to MI. However, previously was admitted after syncope and ended up getting stented when there was an inconclusive stress test.    Past Medical History  Past Medical History:   Diagnosis Date    Coronary artery disease     High cholesterol     Hypertension     Myocardial infarction (H) 2005     Past Surgical History:   Procedure Laterality Date    CARDIAC CATHETERIZATION      CORONARY STENT PLACEMENT  2005    NH CATH PLACEMENT & NJX CORONARY ART ANGIO IMG S&I N/A 4/14/2017    Procedure: Coronary Angiogram;  Surgeon: Roverto Hollis MD;  Location: [facility] Cath Lab;  Service: Cardiology    NH CATH PLMT L HRT & ARTS W/NJX & ANGIO IMG S&I Left 4/14/2017    Procedure: Left Heart Catheterization Without Left Ventriculogram;  Surgeon: Roverto Hollis MD;   "Location: [facility] Lab;  Service: Cardiology     acetaminophen (TYLENOL) 160 MG TBDP  aspirin 81 MG EC tablet  atorvastatin (LIPITOR) 80 MG tablet  ezetimibe (ZETIA) 10 MG tablet  Ibuprofen (ADVIL PO)  lisinopril (ZESTRIL) 20 MG tablet  metoprolol succinate ER (TOPROL XL) 50 MG 24 hr tablet  multivitamin (CENTRUM SILVER) tablet  nitroglycerin (NITROSTAT) 0.4 MG SL tablet  oxyCODONE (ROXICODONE) 5 MG tablet  predniSONE (DELTASONE) 50 MG tablet  sildenafil (VIAGRA) 50 MG tablet      No Known Allergies  Family History  Family History   Problem Relation Age of Onset    Cancer Mother     Coronary Artery Disease Father      Social History   Social History     Tobacco Use    Smoking status: Never    Smokeless tobacco: Never   Vaping Use    Vaping status: Never Used   Substance Use Topics    Alcohol use: Yes     Alcohol/week: 1.0 - 2.0 standard drink of alcohol    Drug use: Yes     Frequency: 0.2 times per week     Types: Marijuana     Comment: \"Once per month\"      A medically appropriate review of systems was performed with pertinent positives and negatives noted in the HPI, and all other systems negative.    Physical Exam   BP: (!) 153/79  Pulse: 76  Temp: 98.4  F (36.9  C)  Resp: 16  Height: 180.3 cm (5' 11\")  Weight: 84.4 kg (186 lb)  SpO2: 94 %  Physical Exam  Vitals and nursing note reviewed.   Constitutional:       General: He is awake. He is not in acute distress.     Appearance: He is well-developed. He is ill-appearing and diaphoretic. He is not toxic-appearing.   HENT:      Head: Normocephalic and atraumatic.      Nose: Nose normal.      Mouth/Throat:      Mouth: Mucous membranes are moist.   Eyes:      General: No scleral icterus.     Conjunctiva/sclera: Conjunctivae normal.   Cardiovascular:      Rate and Rhythm: Normal rate.   Pulmonary:      Effort: Pulmonary effort is normal. No respiratory distress.      Breath sounds: No stridor.   Abdominal:      General: There is no distension. "   Musculoskeletal:         General: No deformity or signs of injury. Normal range of motion.      Cervical back: Normal range of motion and neck supple. No rigidity.   Skin:     General: Skin is warm.      Coloration: Skin is pale. Skin is not jaundiced.      Findings: No rash.   Neurological:      General: No focal deficit present.      Mental Status: He is oriented to person, place, and time. He is lethargic.   Psychiatric:         Attention and Perception: He is inattentive.         Behavior: Behavior normal.         Thought Content: Thought content normal.           ED Course, Procedures, & Data      Procedures            EKG Interpretation:      Interpreted by Amna Patino MD  Time reviewed: 2048  Symptoms at time of EKG: syncope   Rhythm: normal sinus with PACs  Rate: normal  Axis: normal  Ectopy: PACs  Conduction: normal  ST Segments/ T Waves: No ST-T wave changes  Q Waves: none  Comparison to prior: No acute ischemic changes      Clinical Impression: No acute ischemic changes            Results for orders placed or performed during the hospital encounter of 05/23/24   XR Chest Port 1 View     Status: None    Narrative    EXAM: XR CHEST PORT 1 VIEW  LOCATION: [facility]  DATE: 5/23/2024    INDICATION: syncope  COMPARISON: CT 4/4/2017      Impression    IMPRESSION: Negative chest.   [place] Draw     Status: None    Narrative    The following orders were created for panel order [place] Draw.  Procedure                               Abnormality         Status                     ---------                               -----------         ------                     Extra Blue Top Tube[[id number]]                              Final result               Extra Red Top Tube[[id number]]                               Final result               Extra Green Top (Lithium...[[id number]]                      Final result               Extra Purple Top Tube[[id number]]                             Final result                 Please view results for these tests on the individual orders.   Extra Blue Top Tube     Status: None   Result Value Ref Range    Hold Specimen JIC    Extra Red Top Tube     Status: None   Result Value Ref Range    Hold Specimen JIC    Extra Green Top (Lithium Heparin) Tube     Status: None   Result Value Ref Range    Hold Specimen JIC    Extra Purple Top Tube     Status: None   Result Value Ref Range    Hold Specimen JIC    Comprehensive metabolic panel     Status: Abnormal   Result Value Ref Range    Sodium 141 135 - 145 mmol/L    Potassium 3.9 3.4 - 5.3 mmol/L    Carbon Dioxide (CO2) 22 22 - 29 mmol/L    Anion Gap 12 7 - 15 mmol/L    Urea Nitrogen 23.4 (H) 8.0 - 23.0 mg/dL    Creatinine 0.91 0.67 - 1.17 mg/dL    GFR Estimate >90 >60 mL/min/1.73m2    Calcium 8.8 8.8 - 10.2 mg/dL    Chloride 107 98 - 107 mmol/L    Glucose 128 (H) 70 - 99 mg/dL    Alkaline Phosphatase 66 40 - 150 U/L    AST 21 0 - 45 U/L    ALT 27 0 - 70 U/L    Protein Total 6.4 6.4 - 8.3 g/dL    Albumin 3.7 3.5 - 5.2 g/dL    Bilirubin Total 0.5 <=1.2 mg/dL   Troponin T, High Sensitivity     Status: Normal   Result Value Ref Range    Troponin T, High Sensitivity 11 <=22 ng/L   CBC with platelets and differential     Status: Abnormal   Result Value Ref Range    WBC Count 6.1 4.0 - 11.0 10e3/uL    RBC Count 3.64 (L) 4.40 - 5.90 10e6/uL    Hemoglobin 10.5 (L) 13.3 - 17.7 g/dL    Hematocrit 32.5 (L) 40.0 - 53.0 %    MCV 89 78 - 100 fL    MCH 28.8 26.5 - 33.0 pg    MCHC 32.3 31.5 - 36.5 g/dL    RDW 13.6 10.0 - 15.0 %    Platelet Count 208 150 - 450 10e3/uL    % Neutrophils 59 %    % Lymphocytes 23 %    % Monocytes 15 %    % Eosinophils 2 %    % Basophils 1 %    % Immature Granulocytes 0 %    NRBCs per 100 WBC 0 <1 /100    Absolute Neutrophils 3.6 1.6 - 8.3 10e3/uL    Absolute Lymphocytes 1.4 0.8 - 5.3 10e3/uL    Absolute Monocytes 0.9 0.0 - 1.3 10e3/uL    Absolute Eosinophils 0.1 0.0 - 0.7 10e3/uL    Absolute  Basophils 0.0 0.0 - 0.2 10e3/uL    Absolute Immature Granulocytes 0.0 <=0.4 10e3/uL    Absolute NRBCs 0.0 10e3/uL   EKG 12-lead, tracing only     Status: None (Preliminary result)   Result Value Ref Range    Systolic Blood Pressure  mmHg    Diastolic Blood Pressure  mmHg    Ventricular Rate 79 BPM    Atrial Rate 79 BPM    VA Interval 170 ms    QRS Duration 92 ms     ms    QTc 444 ms    P Axis 56 degrees    R AXIS 29 degrees    T Axis 14 degrees    Interpretation ECG       Sinus rhythm with Premature atrial complexes  Otherwise normal ECG     CBC with platelets differential     Status: Abnormal    Narrative    The following orders were created for panel order CBC with platelets differential.  Procedure                               Abnormality         Status                     ---------                               -----------         ------                     CBC with platelets and d...[[id number]]  Abnormal            Final result                 Please view results for these tests on the individual orders.   Results for orders placed or performed in visit on 05/23/24   XR Shoulder Bilateral G/E 2 Views     Status: None    Narrative    EXAM: XR SHOULDER BILATERAL G/E 2 VIEWS  LOCATION: [facility]  DATE: 5/23/2024    INDICATION:  Arthralgia, unspecified joint  COMPARISON: None.      Impression    IMPRESSION:   Degenerative change at the glenohumeral joints bilaterally with osteophytic spurring and joint space narrowing. Mild hypertrophic change at the AC joint bilaterally. Both shoulders negative for fracture or dislocation.    Results for orders placed or performed in visit on 05/23/24   ESR: Erythrocyte sedimentation rate     Status: Abnormal   Result Value Ref Range    Erythrocyte Sedimentation Rate 51 (H) 0 - 20 mm/hr     Medications   ondansetron (ZOFRAN) injection 4 mg (has no administration in time range)   sodium chloride 0.9% BOLUS 1,000 mL (0 mLs Intravenous Stopped 5/23/24  2239)     Labs Ordered and Resulted from Time of ED Arrival to Time of ED Departure   COMPREHENSIVE METABOLIC PANEL - Abnormal       Result Value    Sodium 141      Potassium 3.9      Carbon Dioxide (CO2) 22      Anion Gap 12      Urea Nitrogen 23.4 (*)     Creatinine 0.91      GFR Estimate >90      Calcium 8.8      Chloride 107      Glucose 128 (*)     Alkaline Phosphatase 66      AST 21      ALT 27      Protein Total 6.4      Albumin 3.7      Bilirubin Total 0.5     CBC WITH PLATELETS AND DIFFERENTIAL - Abnormal    WBC Count 6.1      RBC Count 3.64 (*)     Hemoglobin 10.5 (*)     Hematocrit 32.5 (*)     MCV 89      MCH 28.8      MCHC 32.3      RDW 13.6      Platelet Count 208      % Neutrophils 59      % Lymphocytes 23      % Monocytes 15      % Eosinophils 2      % Basophils 1      % Immature Granulocytes 0      NRBCs per 100 WBC 0      Absolute Neutrophils 3.6      Absolute Lymphocytes 1.4      Absolute Monocytes 0.9      Absolute Eosinophils 0.1      Absolute Basophils 0.0      Absolute Immature Granulocytes 0.0      Absolute NRBCs 0.0     TROPONIN T, HIGH SENSITIVITY - Normal    Troponin T, High Sensitivity 11     TROPONIN T, HIGH SENSITIVITY     XR Chest Port 1 View   Final Result   IMPRESSION: Negative chest.             Critical care was not performed.     Medical Decision Making  The patient's presentation was of high complexity (an acute health issue posing potential threat to life or bodily function).    The patient's evaluation involved:  an assessment requiring an independent historian (see separate area of note for details)  review of external note(s) from 2 sources (see separate area of note for details)  review of 3+ test result(s) ordered prior to this encounter (see separate area of note for details)  ordering and/or review of 3+ test(s) in this encounter (see separate area of note for details)  discussion of management or test interpretation with another health professional (see separate area of  note for details)    The patient's management necessitated high risk (a decision regarding hospitalization).    Assessment & Plan    Minor Murcia is a 64 year old male PMH notable for MI s/p stenting, CAD, ASCVD, syncopal episodes, polyarthritis, HTN and HLD who presents to the ED via EMS following a syncopal episode.     Ddx: Cardiac arrhythmia, ACS, vasovagal syncope, dehydration    Diaphoretic and pale on arrival, lethargic but neurologically intact.  Afebrile with mild hypertension to 153/79.  Normal heart rate.  Satting 94% on room air.  Given IV fluids.  EKG shows normal sinus rhythm with PACs and no acute ischemic changes.  CMP unremarkable.  CBC with hemoglobin of 10.5 and normal white count.  Initial troponin 11.  Repeat 13.  Chest x-ray clear.  Patient still not feeling back to baseline on reassessment.  Given his high risk cardiac disease, ongoing symptoms, and previous syncopal episode resulting in cardiac workup with eventual stent placement I do not think he is a good candidate for discharge.  Discussed with [facility] staff who accepted patient for admission for further syncope eval.    I have reviewed the nursing notes. I have reviewed the findings, diagnosis, plan and need for follow up with the patient.    New Prescriptions    No medications on file       Final diagnoses:   Syncope and collapse   I, Maricruz Horowitz, am serving as a trained medical scribe to document services personally performed by Amna Patino MD based on the provider's statements to me on May 23, 2024.  This document has been checked and approved by the attending provider.    I, Amna Patino MD, was physically present and have reviewed and verified the accuracy of this note documented by Maricruz Horowitz medical scribe.      Amna Patino MD  [facility] EMERGENCY DEPARTMENT  5/23/2024     Amna Patino MD  06/01/24 1807

## 2024-05-24 NOTE — MEDICATION SCRIBE - ADMISSION MEDICATION HISTORY
Medication Scribe Admission Medication History    Admission medication history is complete. The information provided in this note is only as accurate as the sources available at the time of the update.    Information Source(s): Patient via in-person    Pertinent Information: Pt reported taking medications on PTA medication list as directed.     Changes made to PTA medication list:  Added: None  Deleted: None  Changed: Tylenol 500 mg tabs, Ibuprofen 600 mg tabs.     Allergies reviewed with patient and updates made in EHR: yes    Medication History Completed By: Alie Carlisle 5/24/2024 5:03 AM    PTA Med List   Medication Sig Last Dose    acetaminophen (TYLENOL) 160 MG TBDP Take 500 mg by mouth 2 times daily 5/23/2024 at 3pm    aspirin 81 MG EC tablet [ASPIRIN 81 MG EC TABLET] Take 81 mg by mouth daily. 5/23/2024 at am    atorvastatin (LIPITOR) 80 MG tablet TAKE 1 TABLET(80 MG) BY MOUTH DAILY 5/23/2024 at am    ezetimibe (ZETIA) 10 MG tablet Take 1 tablet (10 mg) by mouth daily 5/23/2024 at am    Ibuprofen (ADVIL PO) Take 600 mg by mouth 3 times daily 5/23/2024 at am    lisinopril (ZESTRIL) 20 MG tablet TAKE 1 TABLET(20 MG) BY MOUTH DAILY 5/23/2024 at am    metoprolol succinate ER (TOPROL XL) 50 MG 24 hr tablet Take 0.5 tablets (25 mg) by mouth daily 5/23/2024 at am    multivitamin (CENTRUM SILVER) tablet Take 1 tablet by mouth daily 5/23/2024 at am    nitroglycerin (NITROSTAT) 0.4 MG SL tablet [NITROGLYCERIN (NITROSTAT) 0.4 MG SL TABLET] ONE TABLET UNDER TONGUE AS NEEDED FOR CHEST PAIN MAY REPEAT EVERY 5 MINUTES X 2. IF NO RELIEF DIAL 911  at has not used    oxyCODONE (ROXICODONE) 5 MG tablet Take 1-2 tablets (5-10 mg) by mouth nightly as needed for severe pain More than a month    predniSONE (DELTASONE) 50 MG tablet Take 1 tablet (50 mg) by mouth daily for 14 days  at to begin tomorow    sildenafil (VIAGRA) 50 MG tablet Take 1 tablet (50 mg) by mouth daily as needed (ED) More than a month

## 2024-05-28 ENCOUNTER — PATIENT OUTREACH (OUTPATIENT)
Dept: CARE COORDINATION | Facility: CLINIC | Age: 64
End: 2024-05-28
Payer: COMMERCIAL

## 2024-05-28 ENCOUNTER — TELEPHONE (OUTPATIENT)
Dept: FAMILY MEDICINE | Facility: CLINIC | Age: 64
End: 2024-05-28
Payer: COMMERCIAL

## 2024-05-28 DIAGNOSIS — M79.10 MYALGIA: Primary | ICD-10-CM

## 2024-05-28 NOTE — TELEPHONE ENCOUNTER
----- Message from Isauro Gilliam MD sent at 5/28/2024  9:23 AM CDT -----  Please call and inform patient    Minor Murcia  Your results from your recent clinic visit show:  Your inflammatory markers are still elevated. I am going to add on some additional labs that should be able to be run without another blood draw. I want you to also stop your atorvastatin medication as this can cause muscle pain. Try these and tell me if your symptoms are not improving over the next two weeks please inform me and we will consider additional testing    If you have more questions please call the clinic at [phone] or send me a [employer] message    Dr. Isauro Gilliam

## 2024-05-28 NOTE — RESULT ENCOUNTER NOTE
Please call and inform patient    Minor SANJAY Divina  Your results from your recent clinic visit show:  Your inflammatory markers are still elevated. I am going to add on some additional labs that should be able to be run without another blood draw. I want you to also stop your atorvastatin medication as this can cause muscle pain. Try these and tell me if your symptoms are not improving over the next two weeks please inform me and we will consider additional testing    If you have more questions please call the clinic at [phone] or send me a [employer] message    Dr. Isauro Malik

## 2024-05-28 NOTE — TELEPHONE ENCOUNTER
José Antonio Tong RN called Minor on 5/28 and left a message to return call to clinic. If patient calls back, please route to [facility].     TC please route to RN.     José Antonio Tong RN  [facility]

## 2024-05-28 NOTE — PROGRESS NOTES
[facility] Resource Center:   [facility] Resource Center Contact  [facility]/[employer]     Clinical Data: Post-Discharge Outreach     Outreach attempted x 2.  Left message on patient's voicemail, providing [facility]'s central phone number of [phone] ([phone]) for questions/concerns and/or to schedule an appt with an [facility] provider, if they do not have a PCP.      Plan:  [facility] will do no further outreaches at this time.       EMANUEL Baker (she/her/hers)  Social Work Clinic Care Coordinator   [facility]  [email or web address]  [phone]      *Connected Care Resource Team does NOT follow patient ongoing. Referrals are identified based on internal discharge reports and the outreach is to ensure patient has an understanding of their discharge instructions.

## 2024-05-29 ENCOUNTER — LAB (OUTPATIENT)
Dept: LAB | Facility: CLINIC | Age: 64
End: 2024-05-29
Payer: COMMERCIAL

## 2024-05-29 DIAGNOSIS — Z11.59 NEED FOR HEPATITIS C SCREENING TEST: ICD-10-CM

## 2024-05-29 DIAGNOSIS — Z11.4 SCREENING FOR HIV (HUMAN IMMUNODEFICIENCY VIRUS): Primary | ICD-10-CM

## 2024-05-29 DIAGNOSIS — M79.10 MYALGIA: ICD-10-CM

## 2024-05-29 PROCEDURE — 86803 HEPATITIS C AB TEST: CPT

## 2024-05-29 PROCEDURE — 99000 SPECIMEN HANDLING OFFICE-LAB: CPT

## 2024-05-29 PROCEDURE — 36415 COLL VENOUS BLD VENIPUNCTURE: CPT

## 2024-05-29 PROCEDURE — 84443 ASSAY THYROID STIM HORMONE: CPT

## 2024-05-29 PROCEDURE — 87468 ANAPLSMA PHGCYTOPHLM AMP PRB: CPT | Mod: 90

## 2024-05-29 PROCEDURE — 87389 HIV-1 AG W/HIV-1&-2 AB AG IA: CPT

## 2024-05-29 PROCEDURE — 86618 LYME DISEASE ANTIBODY: CPT

## 2024-05-29 PROCEDURE — 87798 DETECT AGENT NOS DNA AMP: CPT | Mod: 90

## 2024-05-29 PROCEDURE — 87469 BABESIA MICROTI AMP PRB: CPT | Mod: 90

## 2024-05-29 PROCEDURE — 87484 EHRLICHA CHAFFEENSIS AMP PRB: CPT | Mod: 90

## 2024-05-29 NOTE — TELEPHONE ENCOUNTER
Pt called back, nor RN available, please call back and leave detailed message and/or send Mychart message.  He is unable to answer phone while at work.

## 2024-05-29 NOTE — TELEPHONE ENCOUNTER
Writer went down to lab to double check if new specimens are needed for the new ordered labs. Per lab, the lab no longer hold specimens for 7 days anymore d/t storage space and d/t the type of labs that are ordered. Writer called and left this detailed message for pt and that he will need to call clinic at [phone] to schedule a lab only for blood draw.    CATIE MayorgaN, RN  [facility]

## 2024-05-29 NOTE — TELEPHONE ENCOUNTER
"Patient returned call and writer informed him of Dr Gilliam's message. Patient stated that he has been on atorvastatin for about 15-20 years and have not experience muscle pain like this from it. He does not think it is from his atorvastatin and that at this time he is not planning to stop taking his atorvastatin unless Dr Gilliam insisted that pt stop taking it.    Patient stated that he is currently on 50 mg daily of Prednisone daily for the next 2 weeks and right now he is \"flying.\" He feels like he is 20 yesar old and have not felt like that in the past 40 years. He is afraid he will not noticed the difference in the next 2 weeks because of currently on prednisone, but with past experience, as soon as he is off prednisone then the pain returns.    He is having a hip replacement upcoming on July 1st with a Pre-op on 6/7/24 with Dr Lu. Should he follow up with Dr Lu then about his atorvastatin issues or should he just stop taking it for now as he doesn't really want to stop taking it?    OK to leave detailed messages per pt.    Emily Han, CATIEN, RN  [facility]      "

## 2024-05-29 NOTE — TELEPHONE ENCOUNTER
See MyChart message from the writer to the patient regarding Dr. Vernon's recommendations for the patient about the patient's atorvastatin.    Nadia Hancock RN, BSN  [facility]

## 2024-05-29 NOTE — TELEPHONE ENCOUNTER
I would recommend patient still stop lipitor and Follow-up as scheduled with Dr. Lu for preop. Unfortunately muscle pain from statin medication can happen anytime so does not matter that he has been on it so long. If he would prefer to wait and discuss with Dr. Lu that is fine. Please call and inform.    Isauro Malik MD

## 2024-05-29 NOTE — TELEPHONE ENCOUNTER
José Antonio Tong RN called Minor on 5/29 and left a message to return call to clinic. If patient calls back, please route to [facility].     TC please route to RN.    RN when patient returns call please relay message from Dr. Gilliam.      José Antonio Tong RN  [facility]

## 2024-05-30 LAB
B BURGDOR IGG+IGM SER QL: 0.47
HCV AB SERPL QL IA: NONREACTIVE
HIV 1+2 AB+HIV1 P24 AG SERPL QL IA: NONREACTIVE
TSH SERPL DL<=0.005 MIU/L-ACNC: 0.38 UIU/ML (ref 0.3–4.2)

## 2024-06-01 LAB
A PHAGOCYTOPH DNA BLD QL NAA+PROBE: NOT DETECTED
B MICROTI DNA BLD QL NAA+PROBE: NOT DETECTED
BABESIA DNA BLD QL NAA+PROBE: NOT DETECTED
E CHAFFEENSIS DNA BLD QL NAA+PROBE: NOT DETECTED
E EWINGII DNA SPEC QL NAA+PROBE: NOT DETECTED
EHRLICHIA DNA SPEC QL NAA+PROBE: NOT DETECTED

## 2024-06-03 ENCOUNTER — TELEPHONE (OUTPATIENT)
Dept: FAMILY MEDICINE | Facility: CLINIC | Age: 64
End: 2024-06-03
Payer: COMMERCIAL

## 2024-06-03 NOTE — TELEPHONE ENCOUNTER
Patient Returning Call    Reason for call:  return call    Information relayed to patient:  no, no RN available at this time. Patient cannot take calls at work and is requesting a detailed message be left on voicemail.    Could we send this information to you in [employer] or would you prefer to receive a phone call?:   Patient would prefer a phone call   Okay to leave a detailed message?: Yes at Cell number on file:    Telephone Information:   Mobile [phone]

## 2024-06-03 NOTE — TELEPHONE ENCOUNTER
Returned call to patient to relay message from Dr. Lu. Patient stated he is doing ok right now because he is on the prednisone and is concerned that when he is done with the prednisone the pain will come back. Patient has enough to last him through Friday but stated he would like to know if it is ok to discuss this with Dr. Lu during his pre op visit.     José Antonio Tong RN  [facility]

## 2024-06-03 NOTE — RESULT ENCOUNTER NOTE
Minor Murcia  Your results from your recent clinic visit show:  Your tick borne illness testing as a potential cause for your muscle aches was all normal    If you have more questions please call the clinic at [phone] or send me a [employer] message    Dr. Isauro Malik     DISPLAY PLAN FREE TEXT

## 2024-06-03 NOTE — TELEPHONE ENCOUNTER
"See lab result note from the PCP to the patient on 6/2/24:    ----- Message from Valentino Lu MD sent at 6/2/2024 11:08 AM CDT -----  \"Please call patient: I received these lab results and have noted that patient had some lab testing obtained in response to a symptom complaint.  Patient also noted to have been in the hospital recently.  Please call patient and determine how things are going and see if we can arrange for a follow-up with me to discuss his concerns and determine if there is any more immediate concern.\"    Writer called the patient and left a message to return call.    When the patient calls back, please route him to the RN queue to gather more information and assist the patient with scheduling a follow up visit with the PCP.    Please also see MyChart encounter from patient, into the clinic, from 6/3/24.    Nadia Hancock RN, BSN  [facility]    "

## 2024-06-06 ENCOUNTER — OFFICE VISIT (OUTPATIENT)
Dept: RHEUMATOLOGY | Facility: CLINIC | Age: 64
End: 2024-06-06
Attending: STUDENT IN AN ORGANIZED HEALTH CARE EDUCATION/TRAINING PROGRAM
Payer: COMMERCIAL

## 2024-06-06 ENCOUNTER — LAB (OUTPATIENT)
Dept: LAB | Facility: CLINIC | Age: 64
End: 2024-06-06
Attending: STUDENT IN AN ORGANIZED HEALTH CARE EDUCATION/TRAINING PROGRAM
Payer: COMMERCIAL

## 2024-06-06 VITALS
WEIGHT: 194 LBS | OXYGEN SATURATION: 98 % | SYSTOLIC BLOOD PRESSURE: 166 MMHG | HEART RATE: 53 BPM | BODY MASS INDEX: 27.06 KG/M2 | DIASTOLIC BLOOD PRESSURE: 91 MMHG

## 2024-06-06 DIAGNOSIS — M85.80 OSTEOPENIA, UNSPECIFIED LOCATION: ICD-10-CM

## 2024-06-06 DIAGNOSIS — Z11.59 NEED FOR HEPATITIS C SCREENING TEST: ICD-10-CM

## 2024-06-06 DIAGNOSIS — M35.3 PMR (POLYMYALGIA RHEUMATICA) (H): ICD-10-CM

## 2024-06-06 DIAGNOSIS — Z11.1 TUBERCULOSIS SCREENING: ICD-10-CM

## 2024-06-06 DIAGNOSIS — Z11.59 NEED FOR HEPATITIS B SCREENING TEST: ICD-10-CM

## 2024-06-06 DIAGNOSIS — M35.3 PMR (POLYMYALGIA RHEUMATICA) (H): Primary | ICD-10-CM

## 2024-06-06 LAB
CRP SERPL-MCNC: <3 MG/L
ERYTHROCYTE [SEDIMENTATION RATE] IN BLOOD BY WESTERGREN METHOD: 22 MM/HR (ref 0–20)
HBV CORE AB SERPL QL IA: NONREACTIVE
HBV SURFACE AB SERPL IA-ACNC: <3.5 M[IU]/ML
HBV SURFACE AB SERPL IA-ACNC: NONREACTIVE M[IU]/ML
HBV SURFACE AG SERPL QL IA: NONREACTIVE
HCV AB SERPL QL IA: NONREACTIVE
VIT D+METAB SERPL-MCNC: 47 NG/ML (ref 20–50)

## 2024-06-06 PROCEDURE — 99213 OFFICE O/P EST LOW 20 MIN: CPT | Performed by: STUDENT IN AN ORGANIZED HEALTH CARE EDUCATION/TRAINING PROGRAM

## 2024-06-06 PROCEDURE — G2211 COMPLEX E/M VISIT ADD ON: HCPCS | Performed by: STUDENT IN AN ORGANIZED HEALTH CARE EDUCATION/TRAINING PROGRAM

## 2024-06-06 PROCEDURE — 82306 VITAMIN D 25 HYDROXY: CPT

## 2024-06-06 PROCEDURE — 86706 HEP B SURFACE ANTIBODY: CPT

## 2024-06-06 PROCEDURE — 86140 C-REACTIVE PROTEIN: CPT

## 2024-06-06 PROCEDURE — 87340 HEPATITIS B SURFACE AG IA: CPT

## 2024-06-06 PROCEDURE — 86803 HEPATITIS C AB TEST: CPT

## 2024-06-06 PROCEDURE — 85652 RBC SED RATE AUTOMATED: CPT

## 2024-06-06 PROCEDURE — 99205 OFFICE O/P NEW HI 60 MIN: CPT | Performed by: STUDENT IN AN ORGANIZED HEALTH CARE EDUCATION/TRAINING PROGRAM

## 2024-06-06 PROCEDURE — 36415 COLL VENOUS BLD VENIPUNCTURE: CPT

## 2024-06-06 PROCEDURE — 86704 HEP B CORE ANTIBODY TOTAL: CPT

## 2024-06-06 PROCEDURE — 86481 TB AG RESPONSE T-CELL SUSP: CPT

## 2024-06-06 RX ORDER — FAMOTIDINE 40 MG/1
40 TABLET, FILM COATED ORAL DAILY
Qty: 90 TABLET | Refills: 0 | Status: SHIPPED | OUTPATIENT
Start: 2024-06-06 | End: 2024-07-10

## 2024-06-06 RX ORDER — PREDNISONE 10 MG/1
TABLET ORAL
Qty: 133 TABLET | Refills: 0 | Status: SHIPPED | OUTPATIENT
Start: 2024-06-06 | End: 2024-07-10

## 2024-06-06 RX ORDER — SULFAMETHOXAZOLE AND TRIMETHOPRIM 400; 80 MG/1; MG/1
1 TABLET ORAL DAILY
Qty: 90 TABLET | Refills: 0 | Status: SHIPPED | OUTPATIENT
Start: 2024-06-06 | End: 2024-07-10

## 2024-06-06 ASSESSMENT — PAIN SCALES - GENERAL: PAINLEVEL: NO PAIN (0)

## 2024-06-06 NOTE — PROGRESS NOTES
RHEUMATOLOGY OUTPATIENT CLINIC NOTE     Referring Provider: Isauro Gilliam MD    Subjective     Visit date June 6, 2024    Minor is a 64 year old male who presents today for consult for concerns of PMR    He was in his usual state of health till March 2024, He had bilateral hip pain with stiffness, he was evaluated by [facility], he received bilateral hip steroid injections with improvement but then had bilateral shoulder pain and stiffness, his pain continued to worsens. He was evaluated by his primary care provider, He was not able to turn in bed or transfer in bed.   Labs in May 1, 2024 showed ESR 64,CRP 72.5. He was then evaluated in the ER, prescribed prednisone 40 mg with significant improvement. Prednisone was tapered. He was then evaluated again by his primary provider in May 23, while off prednisone his symptoms recurred. He was prescribed prednisone 50 mg that he has been taking for 14 days.     He denies headaches, scalp tenderness, jaw claudications, arm/claudications.   He had some weight loss and loss appetite around March-April, he denies fevers.   He denies inflammatory arthritis in hands, wrists, elbows, shoulders, knees, or feet.   He denies sinusitis, epistaxis, hearing loss, inflammatory eye disease, skin rash, tingling or numbness, cough.     He has never smoked  Family history is positive for PMR in sister.     ROS    Current Medications   Prednisone 50 mg daily     Past Medical history   Past Medical History:   Diagnosis Date    Coronary artery disease     High cholesterol     Hypertension     Myocardial infarction (H) 2005       Objective   BP (!) 166/91 (BP Location: Right arm, Patient Position: Sitting, Cuff Size: Adult Large)   Pulse 53   Wt 88 kg (194 lb)   SpO2 98%   BMI 27.06 kg/m        PHYSICAL EXAMINATION  Physical Exam  HENT:      Ears:      Comments: Temporal artery: Bilaterally palpable without tenderness     Mouth/Throat:      Mouth: Mucous membranes are moist.    Eyes:      Conjunctiva/sclera: Conjunctivae normal.   Cardiovascular:      Pulses: Normal pulses.      Heart sounds: Normal heart sounds.      Comments: Pulse: DP  palpable  Pulmonary:      Effort: Pulmonary effort is normal.      Breath sounds: Normal breath sounds.   Musculoskeletal:         General: No swelling or tenderness.      Cervical back: Normal range of motion.      Right lower leg: Edema present.      Left lower leg: Edema present.   Skin:     Findings: No rash.   Neurological:      Mental Status: He is alert.       Lab review     RF:  Negative       Imaging review     X-ray hip bilateral, 3/2023: Degenerative changes    Assessment & Plan     # Polymyalgia Rheumatica   # Prednisone use, chronic   # Screening for chronic infections   # Longitudinal care        # Polymyalgia Rheumatica   Minor's symptoms are consistent with polymyalgia rheumatica with bilateral hip and shoulder stiffness, elevated inflammatory markers and resolution with steroids.  He is currently asymptomatic while on prednisone 50 mg daily.  He denies symptoms suggestive of GCA, inflammatory arthropathy.  There is no evidence of active arthritis on exam today.  No new labs for review    We discussed getting updated inflammatory markers today for trend.    We discussed polymyalgia rheumatica in length and the plan that he will be on prednisone for 12 to 18-month, if there is side effects from prednisone or if he develops flares while on prednisone then sarilumab [Kevzara] can be used as steroid sparing agent.    We discussed symptoms suggestive of GCA at length including new onset headache, scalp tenderness, jaw claudication that he would watch for and I recommended that he would keep us informed if you develop any of the symptoms.    Plan:  1-Taper prednisone to 30 mg daily for the next 2 weeks then [facility] and if he remains asymptomatic then prednisone will be tapered further by 5 mg every 2 weeks till  prednisone 20 mg and he will stay on this dose till his next follow-up.    2- start Pepcid for GI prophylaxis, Bactrim for PJP prophylaxis    # Prednisone use, chronic   # Screening for glucocorticoid induced osteoporosis   Plan: Check vitamin D, DEXA scan    # Screening for chronic infections   Plan: Screen for hepatitis B,C, QuantiFERON gold    # Longitudinal care  The longitudinal plan of care for the diagnosis(es)/condition(s) Polymyalgia  as documented were addressed during this visit. Due to the added complexity in care, I will continue to support Minor in the subsequent management and with ongoing continuity of care.        60 minutes spent by me on the date of the encounter doing chart review, history and exam, documentation and further activities per the note      Return in about 5 weeks (around 7/10/2024) for Follow up.    Valdez Woodson MD  [facility] RHEUMATOLOGY

## 2024-06-06 NOTE — LETTER
6/6/2024       RE: Minor Murcia  [address] [place] [id number]     Dear Colleague,    Thank you for referring your patient, Minor Murcia, to the [facility] RHEUMATOLOGY at [facility]. Please see a copy of my visit note below.    RHEUMATOLOGY OUTPATIENT CLINIC NOTE     Referring Provider: Isauro Gilliam MD    Subjective    Visit date June 6, 2024    Minor is a 64 year old male who presents today for consult for concerns of PMR    He was in his usual state of health till March 2024, He had bilateral hip pain with stiffness, he was evaluated by [facility], he received bilateral hip steroid injections with improvement but then had bilateral shoulder pain and stiffness, his pain continued to worsens. He was evaluated by his primary care provider, He was not able to turn in bed or transfer in bed.   Labs in May 1, 2024 showed ESR 64,CRP 72.5. He was then evaluated in the ER, prescribed prednisone 40 mg with significant improvement. Prednisone was tapered. He was then evaluated again by his primary provider in May 23, while off prednisone his symptoms recurred. He was prescribed prednisone 50 mg that he has been taking for 14 days.     He denies headaches, scalp tenderness, jaw claudications, arm/claudications.   He had some weight loss and loss appetite around March-April, he denies fevers.   He denies inflammatory arthritis in hands, wrists, elbows, shoulders, knees, or feet.   He denies sinusitis, epistaxis, hearing loss, inflammatory eye disease, skin rash, tingling or numbness, cough.     He has never smoked  Family history is positive for PMR in sister.     ROS    Current Medications   Prednisone 50 mg daily     Past Medical history   Past Medical History:   Diagnosis Date    Coronary artery disease     High cholesterol     Hypertension     Myocardial infarction (H) 2005       Objective  BP (!) 166/91 (BP Location: Right arm, Patient Position:  Sitting, Cuff Size: Adult Large)   Pulse 53   Wt 88 kg (194 lb)   SpO2 98%   BMI 27.06 kg/m        PHYSICAL EXAMINATION  Physical Exam  HENT:      Ears:      Comments: Temporal artery: Bilaterally palpable without tenderness     Mouth/Throat:      Mouth: Mucous membranes are moist.   Eyes:      Conjunctiva/sclera: Conjunctivae normal.   Cardiovascular:      Pulses: Normal pulses.      Heart sounds: Normal heart sounds.      Comments: Pulse: DP  palpable  Pulmonary:      Effort: Pulmonary effort is normal.      Breath sounds: Normal breath sounds.   Musculoskeletal:         General: No swelling or tenderness.      Cervical back: Normal range of motion.      Right lower leg: Edema present.      Left lower leg: Edema present.   Skin:     Findings: No rash.   Neurological:      Mental Status: He is alert.       Lab review     RF:  Negative       Imaging review     X-ray hip bilateral, 3/2023: Degenerative changes    Assessment & Plan    # Polymyalgia Rheumatica   # Prednisone use, chronic   # Screening for chronic infections   # Longitudinal care    # Polymyalgia Rheumatica   Minor's symptoms are consistent with polymyalgia rheumatica with bilateral hip and shoulder stiffness, elevated inflammatory markers and resolution with steroids.  He is currently asymptomatic while on prednisone 50 mg daily.  He denies symptoms suggestive of GCA, inflammatory arthropathy.  There is no evidence of active arthritis on exam today.  No new labs for review    We discussed getting updated inflammatory markers today for trend.    We discussed polymyalgia rheumatica in length and the plan that he will be on prednisone for 12 to 18-month, if there is side effects from prednisone or if he develops flares while on prednisone then sarilumab [Kevzara] can be used as steroid sparing agent.    We discussed symptoms suggestive of GCA at length including new onset headache, scalp tenderness, jaw claudication that he would watch for and I  recommended that he would keep us informed if you develop any of the symptoms.    Plan:  1-Taper prednisone to 30 mg daily for the next 2 weeks then he will report symptoms through MyChart and if he remains asymptomatic then prednisone will be tapered further by 5 mg every 2 weeks till prednisone 20 mg and he will stay on this dose till his next follow-up.    2- start Pepcid for GI prophylaxis, Bactrim for PJP prophylaxis    # Prednisone use, chronic   # Screening for glucocorticoid induced osteoporosis   Plan: Check vitamin D, DEXA scan    # Screening for chronic infections   Plan: Screen for hepatitis B,C, QuantiFERON gold    # Longitudinal care  The longitudinal plan of care for the diagnosis(es)/condition(s) Polymyalgia  as documented were addressed during this visit. Due to the added complexity in care, I will continue to support Minor in the subsequent management and with ongoing continuity of care.        60 minutes spent by me on the date of the encounter doing chart review, history and exam, documentation and further activities per the note      Return in about 5 weeks (around 7/10/2024) for Follow up.    Valdez Woodson MD  [facility] RHEUMATOLOGY

## 2024-06-06 NOTE — NURSING NOTE
Chief Complaint   Patient presents with    Consult     BP (!) 166/91 (BP Location: Right arm, Patient Position: Sitting, Cuff Size: Adult Large)   Pulse 53   Wt 88 kg (194 lb)   SpO2 98%   BMI 27.06 kg/m    Renetta COHEN

## 2024-06-07 ENCOUNTER — OFFICE VISIT (OUTPATIENT)
Dept: FAMILY MEDICINE | Facility: CLINIC | Age: 64
End: 2024-06-07
Payer: COMMERCIAL

## 2024-06-07 ENCOUNTER — MYC MEDICAL ADVICE (OUTPATIENT)
Dept: FAMILY MEDICINE | Facility: CLINIC | Age: 64
End: 2024-06-07

## 2024-06-07 VITALS
RESPIRATION RATE: 16 BRPM | HEART RATE: 43 BPM | DIASTOLIC BLOOD PRESSURE: 74 MMHG | OXYGEN SATURATION: 98 % | SYSTOLIC BLOOD PRESSURE: 152 MMHG | WEIGHT: 192.9 LBS | BODY MASS INDEX: 26.9 KG/M2 | TEMPERATURE: 97.9 F

## 2024-06-07 DIAGNOSIS — R00.1 BRADYCARDIA: ICD-10-CM

## 2024-06-07 DIAGNOSIS — I10 ESSENTIAL HYPERTENSION: ICD-10-CM

## 2024-06-07 DIAGNOSIS — I25.10 CORONARY ARTERY DISEASE INVOLVING NATIVE HEART WITHOUT ANGINA PECTORIS, UNSPECIFIED VESSEL OR LESION TYPE: ICD-10-CM

## 2024-06-07 DIAGNOSIS — M35.3 PMR (POLYMYALGIA RHEUMATICA) (H): Primary | ICD-10-CM

## 2024-06-07 DIAGNOSIS — R55 SYNCOPE, UNSPECIFIED SYNCOPE TYPE: ICD-10-CM

## 2024-06-07 LAB
GAMMA INTERFERON BACKGROUND BLD IA-ACNC: 0.01 IU/ML
M TB IFN-G BLD-IMP: NEGATIVE
M TB IFN-G CD4+ BCKGRND COR BLD-ACNC: 0.88 IU/ML
MITOGEN IGNF BCKGRD COR BLD-ACNC: -0.01 IU/ML
MITOGEN IGNF BCKGRD COR BLD-ACNC: 0 IU/ML
QUANTIFERON MITOGEN: 0.89 IU/ML
QUANTIFERON NIL TUBE: 0.01 IU/ML
QUANTIFERON TB1 TUBE: 0.01 IU/ML
QUANTIFERON TB2 TUBE: 0

## 2024-06-07 PROCEDURE — 99214 OFFICE O/P EST MOD 30 MIN: CPT | Performed by: FAMILY MEDICINE

## 2024-06-07 RX ORDER — ATORVASTATIN CALCIUM 80 MG/1
TABLET, FILM COATED ORAL
COMMUNITY
End: 2024-06-14

## 2024-06-07 RX ORDER — HYDROCHLOROTHIAZIDE 12.5 MG/1
12.5 TABLET ORAL DAILY
Qty: 30 TABLET | Refills: 5 | Status: SHIPPED | OUTPATIENT
Start: 2024-06-07 | End: 2024-06-24

## 2024-06-07 RX ORDER — HYDROCHLOROTHIAZIDE 12.5 MG/1
12.5 TABLET ORAL DAILY
Qty: 90 TABLET | OUTPATIENT
Start: 2024-06-07

## 2024-06-07 RX ORDER — METOPROLOL SUCCINATE 25 MG/1
12.5 TABLET, EXTENDED RELEASE ORAL DAILY
Status: SHIPPED
Start: 2024-06-07 | End: 2024-08-31

## 2024-06-07 RX ORDER — METOPROLOL SUCCINATE 25 MG/1
25 TABLET, EXTENDED RELEASE ORAL DAILY
Qty: 30 TABLET | Refills: 5 | Status: SHIPPED | OUTPATIENT
Start: 2024-06-07 | End: 2024-06-07

## 2024-06-07 ASSESSMENT — PAIN SCALES - GENERAL: PAINLEVEL: NO PAIN (0)

## 2024-06-07 NOTE — PROGRESS NOTES
Minor Murcia  BP (!) 152/74 (BP Location: Left arm, Patient Position: Sitting, Cuff Size: Adult Regular)   Pulse (!) 43   Temp 97.9  F (36.6  C) (Temporal)   Resp 16   Wt 87.5 kg (192 lb 14.4 oz)   SpO2 98%   BMI 26.90 kg/m       Assessment/Plan:                Minor was seen today for hypertension and recheck medication.    Diagnoses and all orders for this visit:    PMR (polymyalgia rheumatica) (H24)    Essential hypertension  -     hydroCHLOROthiazide 12.5 MG tablet; Take 1 tablet (12.5 mg) by mouth daily  -     metoprolol succinate ER (TOPROL XL) 25 MG 24 hr tablet; Take 1 tablet (25 mg) by mouth daily    Bradycardia    Syncope, unspecified syncope type    Coronary artery disease involving native heart without angina pectoris, unspecified vessel or lesion type  -     metoprolol succinate ER (TOPROL XL) 25 MG 24 hr tablet; Take 1 tablet (25 mg) by mouth daily         DISCUSSION  See discussion below.    Reduce metoprolol dose to 25.  (Had been 12.5 last year after our discussion due to bradycardia but had gone up to 50 mg most recently).  I question as to whether it would be truly necessary for him to stay on metoprolol long-term but will defer to cardiologist.    Add hydrochlorothiazide to current lisinopril for blood pressure control.      Subjective:     HPI:    Minor Murcia is a 64 year old male is here today to follow-up on several complex concerns.    He does have known underlying bilateral hip osteoarthritis he was going to have hip replacement surgery coming up in 3 weeks.  He had had a recent cortisone injection this spring and following that started to develop shoulder pain hip girdle pain that was severe.    He was placed on an initial course of prednisone with a laboratory workup that showed elevated CRP.  The prednisone helped with symptoms.  After that initial shorter course of prednisone was complete pain symptoms returned.      On March 2030 had presented to the emergency department with a  syncopal episode.  This occurred at the [facility].  He does admit to consuming several beers and having a THC gummy.  He was in some pain.    He was placed back on prednisone 50 mg and had again improvement in symptoms but was quite concerned.  I was able to speak with him recently by phone and we thought that his clinical picture was more consistent with polymyalgia rheumatica and likely to necessitate ongoing prednisone treatment and needing the help of her rheumatologist for confirmation of diagnosis and help with management.  He was able to get in urgently and saw a rheumatology yesterday.  Their consultation note is reviewed and discussed today.  He will step down slightly on his prednisone dose with a well outlined plan for treatment and evaluation in the near future in this regard.    Current concerns were addressed today are elevated blood pressure, bradycardia and the history of syncope.    He is bradycardic likely because his metoprolol dose increased somewhat unintentionally.  He is apparently taking 50 mg, at our last visit about a year ago we had him down to 12.5 mg.  This is likely a contributing factor for his syncopal episode.  The metoprolol is not likely a major contributing factor to improvement in blood pressure.  It is certainly something that is causing bradycardia and may have been a contributing factor to his syncopal episode.  He does have a normal ejection fraction on most recent echocardiogram, I question whether being on a beta-blocker is truly necessary but will defer this to his cardiologist.  At this point we will lower his dose from the 50 he was taking down to 25.    For improved blood pressure discussed the situation.  We are unlikely to get significant lowering of blood pressure by increasing lisinopril further, we would have more risk of hyperkalemia.  We will start him on hydrochlorothiazide 12.5 as I think this will help the most.    Otherwise overall doing well completed a  handicap parking form today given the situation.    ROS:  Complete review of systems is obtained.  Other than the specific considerations noted above complete review of systems is negative.          Objective:   Medications:  Current Outpatient Medications   Medication Sig Dispense Refill    acetaminophen (TYLENOL) 160 MG TBDP Take 500 mg by mouth 2 times daily as needed for mild pain      aspirin 81 MG EC tablet [ASPIRIN 81 MG EC TABLET] Take 81 mg by mouth daily.      atorvastatin (LIPITOR) 80 MG tablet       ezetimibe (ZETIA) 10 MG tablet Take 1 tablet (10 mg) by mouth daily 90 tablet 3    famotidine (PEPCID) 40 MG tablet Take 1 tablet (40 mg) by mouth daily for 90 days 90 tablet 0    hydroCHLOROthiazide 12.5 MG tablet Take 1 tablet (12.5 mg) by mouth daily 30 tablet 5    Ibuprofen (ADVIL PO) Take 600 mg by mouth 3 times daily as needed for moderate pain      lisinopril (ZESTRIL) 20 MG tablet TAKE 1 TABLET(20 MG) BY MOUTH DAILY 90 tablet 0    metoprolol succinate ER (TOPROL XL) 25 MG 24 hr tablet Take 1 tablet (25 mg) by mouth daily 30 tablet 5    multivitamin (CENTRUM SILVER) tablet Take 1 tablet by mouth daily      nitroglycerin (NITROSTAT) 0.4 MG SL tablet [NITROGLYCERIN (NITROSTAT) 0.4 MG SL TABLET] ONE TABLET UNDER TONGUE AS NEEDED FOR CHEST PAIN MAY REPEAT EVERY 5 MINUTES X 2. IF NO RELIEF DIAL 911 25 tablet 1    oxyCODONE (ROXICODONE) 5 MG tablet Take 1-2 tablets (5-10 mg) by mouth nightly as needed for severe pain 14 tablet 0    predniSONE (DELTASONE) 10 MG tablet Take 3 tablets (30 mg) by mouth daily for 14 days, THEN 2.5 tablets (25 mg) daily for 14 days, THEN 2 tablets (20 mg) daily for 28 days. 133 tablet 0    sildenafil (VIAGRA) 50 MG tablet Take 1 tablet (50 mg) by mouth daily as needed (ED) 30 tablet 1    sulfamethoxazole-trimethoprim (BACTRIM) 400-80 MG tablet Take 1 tablet by mouth daily for 90 days 90 tablet 0     No current facility-administered medications for this visit.        Allergies:    "No Known Allergies     Social History     Socioeconomic History    Marital status:      Spouse name: Not on file    Number of children: Not on file    Years of education: Not on file    Highest education level: Not on file   Occupational History    Not on file   Tobacco Use    Smoking status: Never    Smokeless tobacco: Never   Vaping Use    Vaping status: Never Used   Substance and Sexual Activity    Alcohol use: Yes     Alcohol/week: 1.0 - 2.0 standard drink of alcohol    Drug use: Yes     Frequency: 0.2 times per week     Types: Marijuana     Comment: \"Once per month\"    Sexual activity: Not on file   Other Topics Concern    Not on file   Social History Narrative    Not on file     Social Determinants of Health     Financial Resource Strain: Not on file   Food Insecurity: Not on file   Transportation Needs: Not on file   Physical Activity: Not on file   Stress: Not on file   Social Connections: Not on file   Interpersonal Safety: Low Risk  (5/1/2024)    Interpersonal Safety     Do you feel physically and emotionally safe where you currently live?: Yes     Within the past 12 months, have you been hit, slapped, kicked or otherwise physically hurt by someone?: No     Within the past 12 months, have you been humiliated or emotionally abused in other ways by your partner or ex-partner?: No   Housing Stability: Not on file       Family History   Problem Relation Age of Onset    Cancer Mother     Coronary Artery Disease Father         Most Recent Immunizations   Administered Date(s) Administered    COVID-19 Bivalent 12+ (Pfizer) 12/07/2022    COVID-19 MONOVALENT 12+ (Pfizer) 11/22/2021    COVID-19 Monovalent 18+ (Moderna) 02/11/2021    DT (PEDS <7y) 07/16/2003    Flu, Unspecified 09/21/2020    HepA, Unspecified 07/16/2003    Hepatitis A (ADULT 19+) 07/16/2003    Influenza (H1N1) 12/18/2009    Influenza (IIV3) PF 09/03/2013    Influenza Vaccine 65+ (Fluzone HD) 08/17/2020    Influenza Vaccine >6 months,quad, PF " 12/07/2022    Influenza Vaccine, 6+MO IM (QUADRIVALENT W/PRESERVATIVES) 11/22/2021    Influenza, seasonal, injectable, PF 09/12/2015    TD,PF 7+ (Tenivac) 09/20/2021    TDAP (Adacel,Boostrix) 05/20/2011    Zoster recombinant adjuvanted (SHINGRIX) 09/13/2018        Wt Readings from Last 3 Encounters:   06/07/24 87.5 kg (192 lb 14.4 oz)   06/06/24 88 kg (194 lb)   05/23/24 84.4 kg (186 lb)        BP Readings from Last 6 Encounters:   06/07/24 (!) 152/74   06/06/24 (!) 166/91   05/24/24 134/66   05/23/24 130/80   05/02/24 131/77   05/01/24 120/70        Hemoglobin A1C   Date Value Ref Range Status   05/01/2024 5.8 (H) 0.0 - 5.6 % Final     Comment:     Normal <5.7%   Prediabetes 5.7-6.4%    Diabetes 6.5% or higher     Note: Adopted from ADA consensus guidelines.              PHYSICAL EXAM:    BP (!) 152/74 (BP Location: Left arm, Patient Position: Sitting, Cuff Size: Adult Regular)   Pulse (!) 43   Temp 97.9  F (36.6  C) (Temporal)   Resp 16   Wt 87.5 kg (192 lb 14.4 oz)   SpO2 98%   BMI 26.90 kg/m           General Appearance:    Alert, cooperative, no distress   Eyes:   No scleral icterus or conjunctival irritation       Lungs:     Clear to auscultation bilaterally, respirations unlabored, no wheezes or crackles   Heart:    Regular rate and rhythm,  No murmur   Extremities:  No edema, no joint swelling or redness, no evidence of any injuries   Skin:  No concerning skin findings, no suspicious moles, no rashes   Neurologic:  On gross examination there is no motor or sensory deficit.  Patient walks with a normal gait

## 2024-06-09 NOTE — RESULT ENCOUNTER NOTE
Inflammatory markers have improved with normalized CRP    The plan remain as discussed in the last visit.     Valdez Woodson MD

## 2024-06-11 ENCOUNTER — ANCILLARY PROCEDURE (OUTPATIENT)
Dept: BONE DENSITY | Facility: CLINIC | Age: 64
End: 2024-06-11
Attending: STUDENT IN AN ORGANIZED HEALTH CARE EDUCATION/TRAINING PROGRAM
Payer: COMMERCIAL

## 2024-06-11 DIAGNOSIS — M85.80 OSTEOPENIA, UNSPECIFIED LOCATION: ICD-10-CM

## 2024-06-11 PROCEDURE — 77089 TBS DXA CAL W/I&R FX RISK: CPT | Performed by: RADIOLOGY

## 2024-06-11 PROCEDURE — 77080 DXA BONE DENSITY AXIAL: CPT | Mod: TC | Performed by: RADIOLOGY

## 2024-06-12 ENCOUNTER — OFFICE VISIT (OUTPATIENT)
Dept: CARDIOLOGY | Facility: CLINIC | Age: 64
End: 2024-06-12
Payer: COMMERCIAL

## 2024-06-12 VITALS
HEART RATE: 55 BPM | SYSTOLIC BLOOD PRESSURE: 153 MMHG | WEIGHT: 182 LBS | DIASTOLIC BLOOD PRESSURE: 74 MMHG | BODY MASS INDEX: 25.38 KG/M2 | RESPIRATION RATE: 14 BRPM

## 2024-06-12 DIAGNOSIS — I10 PRIMARY HYPERTENSION: ICD-10-CM

## 2024-06-12 DIAGNOSIS — E78.00 PURE HYPERCHOLESTEROLEMIA: ICD-10-CM

## 2024-06-12 DIAGNOSIS — I25.10 CORONARY ARTERY DISEASE INVOLVING NATIVE CORONARY ARTERY OF NATIVE HEART WITHOUT ANGINA PECTORIS: Primary | ICD-10-CM

## 2024-06-12 PROCEDURE — G2211 COMPLEX E/M VISIT ADD ON: HCPCS | Performed by: STUDENT IN AN ORGANIZED HEALTH CARE EDUCATION/TRAINING PROGRAM

## 2024-06-12 PROCEDURE — 99214 OFFICE O/P EST MOD 30 MIN: CPT | Performed by: STUDENT IN AN ORGANIZED HEALTH CARE EDUCATION/TRAINING PROGRAM

## 2024-06-12 RX ORDER — AMLODIPINE BESYLATE 5 MG/1
5 TABLET ORAL DAILY
Qty: 30 TABLET | Refills: 5 | Status: SHIPPED | OUTPATIENT
Start: 2024-06-12 | End: 2024-06-17

## 2024-06-12 NOTE — PROGRESS NOTES
[facility]   [address]  [place] [address]   [email or web address]   OFFICE: [phone]     CARDIOLOGY CLINIC NOTE     Thank you, Valentino Hernandez, for asking the [facility] Heart Care team to see Mr. Minor Murcia to evaluate New Patient        History of Present Illness   Mr. Minor Murcia is a 64 year old male with a significant past history of CAD s/p PCI to distal RCA and mid LAD (2005) c/b ISR s/p PCI of prox/mid LAD (2017), HTN, HLD, PMR,  who presents for evaluation.  The patient was recently diagnosed with PMR And is on high dose prednisone with a prolonged taper.  He notes no symptoms related to the heart currently.  He does note an episode of syncope last month which was attributed to vasovagal.  He notes no palpitations, chest pain.  His BP has been elevated.  Hydrochlorothiazide was recently started and he lost 12 lb of fluid in 1 week.           Medications  Allergies   Current Outpatient Medications   Medication Sig Dispense Refill    acetaminophen (TYLENOL) 160 MG TBDP Take 500 mg by mouth 2 times daily as needed for mild pain      amLODIPine (NORVASC) 5 MG tablet Take 1 tablet (5 mg) by mouth daily 30 tablet 5    aspirin 81 MG EC tablet [ASPIRIN 81 MG EC TABLET] Take 81 mg by mouth daily.      atorvastatin (LIPITOR) 80 MG tablet       ezetimibe (ZETIA) 10 MG tablet Take 1 tablet (10 mg) by mouth daily 90 tablet 3    famotidine (PEPCID) 40 MG tablet Take 1 tablet (40 mg) by mouth daily for 90 days 90 tablet 0    hydroCHLOROthiazide 12.5 MG tablet Take 1 tablet (12.5 mg) by mouth daily 30 tablet 5    Ibuprofen (ADVIL PO) Take 600 mg by mouth 3 times daily as needed for moderate pain      lisinopril (ZESTRIL) 20 MG tablet TAKE 1 TABLET(20 MG) BY MOUTH DAILY 90 tablet 0    metoprolol succinate ER (TOPROL XL) 25 MG 24 hr tablet Take 0.5 tablets (12.5 mg) by mouth daily      multivitamin (CENTRUM SILVER) tablet Take 1 tablet by mouth daily       nitroglycerin (NITROSTAT) 0.4 MG SL tablet [NITROGLYCERIN (NITROSTAT) 0.4 MG SL TABLET] ONE TABLET UNDER TONGUE AS NEEDED FOR CHEST PAIN MAY REPEAT EVERY 5 MINUTES X 2. IF NO RELIEF DIAL 911 25 tablet 1    oxyCODONE (ROXICODONE) 5 MG tablet Take 1-2 tablets (5-10 mg) by mouth nightly as needed for severe pain 14 tablet 0    predniSONE (DELTASONE) 10 MG tablet Take 3 tablets (30 mg) by mouth daily for 14 days, THEN 2.5 tablets (25 mg) daily for 14 days, THEN 2 tablets (20 mg) daily for 28 days. 133 tablet 0    sildenafil (VIAGRA) 50 MG tablet Take 1 tablet (50 mg) by mouth daily as needed (ED) 30 tablet 1    sulfamethoxazole-trimethoprim (BACTRIM) 400-80 MG tablet Take 1 tablet by mouth daily for 90 days 90 tablet 0      No Known Allergies     Physical Examination Review of Systems   Vitals: BP (!) 153/74 (BP Location: Right arm, Patient Position: Sitting, Cuff Size: Adult Regular)   Pulse 55   Resp 14   Wt 82.6 kg (182 lb)   BMI 25.38 kg/m    BMI= Body mass index is 25.38 kg/m .  Wt Readings from Last 3 Encounters:   06/12/24 82.6 kg (182 lb)   06/07/24 87.5 kg (192 lb 14.4 oz)   06/06/24 88 kg (194 lb)       General: pleasant male. No acute distress.   Neck: No JVD  Lungs: clear to auscultation  COR:  regular rate and rhythm, No murmurs, rubs, or gallops  Extrem: No edema        Please refer above for cardiac ROS details.       Past History     Family History:   Family History   Problem Relation Age of Onset    Cancer Mother     Coronary Artery Disease Father         Social History:   Social History     Socioeconomic History    Marital status:      Spouse name: Not on file    Number of children: Not on file    Years of education: Not on file    Highest education level: Not on file   Occupational History    Not on file   Tobacco Use    Smoking status: Never    Smokeless tobacco: Never   Vaping Use    Vaping status: Never Used   Substance and Sexual Activity    Alcohol use: Yes     Alcohol/week: 1.0 - 2.0  "standard drink of alcohol    Drug use: Yes     Frequency: 0.2 times per week     Types: Marijuana     Comment: \"Once per month\"    Sexual activity: Not on file   Other Topics Concern    Not on file   Social History Narrative    Not on file     Social Determinants of Health     Financial Resource Strain: Not on file   Food Insecurity: Not on file   Transportation Needs: Not on file   Physical Activity: Not on file   Stress: Not on file   Social Connections: Not on file   Interpersonal Safety: Low Risk  (5/1/2024)    Interpersonal Safety     Do you feel physically and emotionally safe where you currently live?: Yes     Within the past 12 months, have you been hit, slapped, kicked or otherwise physically hurt by someone?: No     Within the past 12 months, have you been humiliated or emotionally abused in other ways by your partner or ex-partner?: No   Housing Stability: Not on file            Lab Results    Chemistry/lipid CBC Cardiac Enzymes/BNP/TSH/INR   Lab Results   Component Value Date    CHOL 133 05/01/2024    HDL 40 05/01/2024    TRIG 57 05/01/2024    BUN 18.4 05/24/2024     05/24/2024    CO2 22 05/24/2024    Lab Results   Component Value Date    WBC 7.5 05/24/2024    HGB 10.1 (L) 05/24/2024    HCT 31.5 (L) 05/24/2024    MCV 91 05/24/2024     05/24/2024    Lab Results   Component Value Date     (H) 03/09/2018    TSH 0.38 05/29/2024          Cardiac Problems and Cardiac Diagnostics     Most Recent Cardiac testing:  ECG Personal interpretation  5/23/2024  NSR, PAcs    Stress test 9/26/022:   Interpretation Summary  1. Normal stress echocardiogram without evidence of stress induced ischemia.  2. Normal resting LV systolic performance with an ejection fraction of 60%.  There is normal improvement in left ventricular systolic performance with a  peak ejection fraction of 70-75%.  3. No ECG evidence of ischemia.  4. No anginal chest pain reported with exercise.  5. Good functional capacity for " age.    Cardiac cath 4/14/2017:  LM minimal dz  LAD 90% prox/mid LAD ISR  LCx mild dz  RCA mild dz     Successful PCI of prox/mid LAD with 2.5x32 Synergy ALONDRA, post dilated to high ny with 2.75 NC balloon  0% residual, NIK 3 flow pre and post       Assessment/Recommendations   Assessment:    Mr. Minor Murcia is a 64 year old male with a significant past history of CAD s/p PCI to distal RCA and mid LAD (2005) c/b ISR s/p PCI of prox/mid LAD (2017), HTN, HLD, PMR,  who presents for evaluation.      CAD   - s/p PCI to distal RCA and mid LAD (2005) c/b ISR s/p PCI of prox/mid LAD (2017)   - LDL 82.  Continue atorvastatin 80mg and zetia.  Continue to work on lifestyle to target LDL <70.  Consider switch to rosuvastatin in the future however do not want to do so right now while getting treatment for PMR   - Cont ASA 81mg   - Cont low dose metoprolol.  Mild resting bradycardia, asymptomatic   - Stress test 9/2022 wnl    HTN   - BP remains uncontrolled, likely driven by prednisone   - Getting significant diuretic effect from hydrochlorothiazide so will not increase this.  Add amlodipine 5mg qdaily   - Send BP log in 2 weeks    RTC 6 months    The longitudinal plan of care for CAD was addressed during this visit. Due to the added complexity in care, I will continue to support Minor Murcia  in the subsequent management of this condition(s) and with the ongoing continuity of care of this condition(s).           Julio César Ro DO [facility]  Non-invasive Cardiologist  [facility]

## 2024-06-12 NOTE — PATIENT INSTRUCTIONS
It was a pleasure meeting you today    In summary:    We will add amlodipine 5mg daily for blood pressure.  Please send me a BP log in 2  weeks.    Please call my nurse Rodney Sandoval at [phone] if you have any questions or issues.    We will schedule a follow up visit in  6 months      Julio César Ro DO [facility]  Non-invasive Cardiologist  [facility]

## 2024-06-12 NOTE — LETTER
6/12/2024    Valentino Lu MD, MD  [address]  [facility] [address]    RE: Minor Murcia       Dear Colleague,     I had the pleasure of seeing Minor Murcia in the [facility].    [facility]   [address]  [place] [address]   [email or web address]   OFFICE: [phone]     CARDIOLOGY CLINIC NOTE     Thank you, Valentino Hernandez, for asking the [facility] Heart Care team to see Mr. Minor Murcia to evaluate New Patient        History of Present Illness   Mr. Minor Murcia is a 64 year old male with a significant past history of CAD s/p PCI to distal RCA and mid LAD (2005) c/b ISR s/p PCI of prox/mid LAD (2017), HTN, HLD, PMR,  who presents for evaluation.  The patient was recently diagnosed with PMR And is on high dose prednisone with a prolonged taper.  He notes no symptoms related to the heart currently.  He does note an episode of syncope last month which was attributed to vasovagal.  He notes no palpitations, chest pain.  His BP has been elevated.  Hydrochlorothiazide was recently started and he lost 12 lb of fluid in 1 week.           Medications  Allergies   Current Outpatient Medications   Medication Sig Dispense Refill    acetaminophen (TYLENOL) 160 MG TBDP Take 500 mg by mouth 2 times daily as needed for mild pain      amLODIPine (NORVASC) 5 MG tablet Take 1 tablet (5 mg) by mouth daily 30 tablet 5    aspirin 81 MG EC tablet [ASPIRIN 81 MG EC TABLET] Take 81 mg by mouth daily.      atorvastatin (LIPITOR) 80 MG tablet       ezetimibe (ZETIA) 10 MG tablet Take 1 tablet (10 mg) by mouth daily 90 tablet 3    famotidine (PEPCID) 40 MG tablet Take 1 tablet (40 mg) by mouth daily for 90 days 90 tablet 0    hydroCHLOROthiazide 12.5 MG tablet Take 1 tablet (12.5 mg) by mouth daily 30 tablet 5    Ibuprofen (ADVIL PO) Take 600 mg by mouth 3 times daily as needed for moderate pain      lisinopril (ZESTRIL) 20 MG tablet TAKE 1 TABLET(20  MG) BY MOUTH DAILY 90 tablet 0    metoprolol succinate ER (TOPROL XL) 25 MG 24 hr tablet Take 0.5 tablets (12.5 mg) by mouth daily      multivitamin (CENTRUM SILVER) tablet Take 1 tablet by mouth daily      nitroglycerin (NITROSTAT) 0.4 MG SL tablet [NITROGLYCERIN (NITROSTAT) 0.4 MG SL TABLET] ONE TABLET UNDER TONGUE AS NEEDED FOR CHEST PAIN MAY REPEAT EVERY 5 MINUTES X 2. IF NO RELIEF DIAL 911 25 tablet 1    oxyCODONE (ROXICODONE) 5 MG tablet Take 1-2 tablets (5-10 mg) by mouth nightly as needed for severe pain 14 tablet 0    predniSONE (DELTASONE) 10 MG tablet Take 3 tablets (30 mg) by mouth daily for 14 days, THEN 2.5 tablets (25 mg) daily for 14 days, THEN 2 tablets (20 mg) daily for 28 days. 133 tablet 0    sildenafil (VIAGRA) 50 MG tablet Take 1 tablet (50 mg) by mouth daily as needed (ED) 30 tablet 1    sulfamethoxazole-trimethoprim (BACTRIM) 400-80 MG tablet Take 1 tablet by mouth daily for 90 days 90 tablet 0      No Known Allergies     Physical Examination Review of Systems   Vitals: BP (!) 153/74 (BP Location: Right arm, Patient Position: Sitting, Cuff Size: Adult Regular)   Pulse 55   Resp 14   Wt 82.6 kg (182 lb)   BMI 25.38 kg/m    BMI= Body mass index is 25.38 kg/m .  Wt Readings from Last 3 Encounters:   06/12/24 82.6 kg (182 lb)   06/07/24 87.5 kg (192 lb 14.4 oz)   06/06/24 88 kg (194 lb)       General: pleasant male. No acute distress.   Neck: No JVD  Lungs: clear to auscultation  COR:  regular rate and rhythm, No murmurs, rubs, or gallops  Extrem: No edema        Please refer above for cardiac ROS details.       Past History     Family History:   Family History   Problem Relation Age of Onset    Cancer Mother     Coronary Artery Disease Father         Social History:   Social History     Socioeconomic History    Marital status:      Spouse name: Not on file    Number of children: Not on file    Years of education: Not on file    Highest education level: Not on file   Occupational  "History    Not on file   Tobacco Use    Smoking status: Never    Smokeless tobacco: Never   Vaping Use    Vaping status: Never Used   Substance and Sexual Activity    Alcohol use: Yes     Alcohol/week: 1.0 - 2.0 standard drink of alcohol    Drug use: Yes     Frequency: 0.2 times per week     Types: Marijuana     Comment: \"Once per month\"    Sexual activity: Not on file   Other Topics Concern    Not on file   Social History Narrative    Not on file     Social Determinants of Health     Financial Resource Strain: Not on file   Food Insecurity: Not on file   Transportation Needs: Not on file   Physical Activity: Not on file   Stress: Not on file   Social Connections: Not on file   Interpersonal Safety: Low Risk  (5/1/2024)    Interpersonal Safety     Do you feel physically and emotionally safe where you currently live?: Yes     Within the past 12 months, have you been hit, slapped, kicked or otherwise physically hurt by someone?: No     Within the past 12 months, have you been humiliated or emotionally abused in other ways by your partner or ex-partner?: No   Housing Stability: Not on file            Lab Results    Chemistry/lipid CBC Cardiac Enzymes/BNP/TSH/INR   Lab Results   Component Value Date    CHOL 133 05/01/2024    HDL 40 05/01/2024    TRIG 57 05/01/2024    BUN 18.4 05/24/2024     05/24/2024    CO2 22 05/24/2024    Lab Results   Component Value Date    WBC 7.5 05/24/2024    HGB 10.1 (L) 05/24/2024    HCT 31.5 (L) 05/24/2024    MCV 91 05/24/2024     05/24/2024    Lab Results   Component Value Date     (H) 03/09/2018    TSH 0.38 05/29/2024          Cardiac Problems and Cardiac Diagnostics     Most Recent Cardiac testing:  ECG Personal interpretation  5/23/2024  NSR, PAcs    Stress test 9/26/022:   Interpretation Summary  1. Normal stress echocardiogram without evidence of stress induced ischemia.  2. Normal resting LV systolic performance with an ejection fraction of 60%.  There is normal " improvement in left ventricular systolic performance with a  peak ejection fraction of 70-75%.  3. No ECG evidence of ischemia.  4. No anginal chest pain reported with exercise.  5. Good functional capacity for age.    Cardiac cath 4/14/2017:  LM minimal dz  LAD 90% prox/mid LAD ISR  LCx mild dz  RCA mild dz     Successful PCI of prox/mid LAD with 2.5x32 Synergy ALONDRA, post dilated to high yn with 2.75 NC balloon  0% residual, NIK 3 flow pre and post       Assessment/Recommendations   Assessment:    Mr. Minor Murcia is a 64 year old male with a significant past history of CAD s/p PCI to distal RCA and mid LAD (2005) c/b ISR s/p PCI of prox/mid LAD (2017), HTN, HLD, PMR,  who presents for evaluation.      CAD   - s/p PCI to distal RCA and mid LAD (2005) c/b ISR s/p PCI of prox/mid LAD (2017)   - LDL 82.  Continue atorvastatin 80mg and zetia.  Continue to work on lifestyle to target LDL <70.  Consider switch to rosuvastatin in the future however do not want to do so right now while getting treatment for PMR   - Cont ASA 81mg   - Cont low dose metoprolol.  Mild resting bradycardia, asymptomatic   - Stress test 9/2022 wnl    HTN   - BP remains uncontrolled, likely driven by prednisone   - Getting significant diuretic effect from hydrochlorothiazide so will not increase this.  Add amlodipine 5mg qdaily   - Send BP log in 2 weeks    RTC 6 months    The longitudinal plan of care for CAD was addressed during this visit. Due to the added complexity in care, I will continue to support Minor Murcia  in the subsequent management of this condition(s) and with the ongoing continuity of care of this condition(s).           Julio César Ro DO [facility]  Non-invasive Cardiologist  [facility]         Thank you for allowing me to participate in the care of your patient.      Sincerely,     Julio César Ro DO     [facility]  cc:   Referred Self,

## 2024-06-14 ENCOUNTER — MYC REFILL (OUTPATIENT)
Dept: FAMILY MEDICINE | Facility: CLINIC | Age: 64
End: 2024-06-14
Payer: COMMERCIAL

## 2024-06-14 DIAGNOSIS — I25.10 CORONARY ARTERY DISEASE INVOLVING NATIVE HEART WITHOUT ANGINA PECTORIS, UNSPECIFIED VESSEL OR LESION TYPE: Primary | ICD-10-CM

## 2024-06-17 DIAGNOSIS — I25.10 CORONARY ARTERY DISEASE INVOLVING NATIVE HEART WITHOUT ANGINA PECTORIS, UNSPECIFIED VESSEL OR LESION TYPE: ICD-10-CM

## 2024-06-17 DIAGNOSIS — I10 PRIMARY HYPERTENSION: ICD-10-CM

## 2024-06-17 DIAGNOSIS — I10 ESSENTIAL HYPERTENSION: ICD-10-CM

## 2024-06-17 PROCEDURE — 93248 EXT ECG>7D<15D REV&INTERPJ: CPT | Performed by: INTERNAL MEDICINE

## 2024-06-17 RX ORDER — AMLODIPINE BESYLATE 5 MG/1
5 TABLET ORAL DAILY
Qty: 90 TABLET | Refills: 1 | Status: SHIPPED | OUTPATIENT
Start: 2024-06-17

## 2024-06-17 RX ORDER — ATORVASTATIN CALCIUM 80 MG/1
80 TABLET, FILM COATED ORAL DAILY
Qty: 90 TABLET | Refills: 3 | Status: SHIPPED | OUTPATIENT
Start: 2024-06-17

## 2024-06-21 RX ORDER — CALCIUM CARBONATE 300MG(750)
1 TABLET,CHEWABLE ORAL DAILY
COMMUNITY
Start: 2024-06-18

## 2024-06-24 ENCOUNTER — OFFICE VISIT (OUTPATIENT)
Dept: FAMILY MEDICINE | Facility: CLINIC | Age: 64
End: 2024-06-24
Payer: COMMERCIAL

## 2024-06-24 VITALS
DIASTOLIC BLOOD PRESSURE: 74 MMHG | SYSTOLIC BLOOD PRESSURE: 122 MMHG | OXYGEN SATURATION: 98 % | BODY MASS INDEX: 25.98 KG/M2 | RESPIRATION RATE: 18 BRPM | HEIGHT: 70 IN | WEIGHT: 181.5 LBS | HEART RATE: 65 BPM

## 2024-06-24 DIAGNOSIS — R35.0 URINARY FREQUENCY: Primary | ICD-10-CM

## 2024-06-24 LAB
ALBUMIN UR-MCNC: NEGATIVE MG/DL
APPEARANCE UR: CLEAR
BACTERIA #/AREA URNS HPF: ABNORMAL /HPF
BILIRUB UR QL STRIP: NEGATIVE
COLOR UR AUTO: YELLOW
GLUCOSE UR STRIP-MCNC: NEGATIVE MG/DL
HGB UR QL STRIP: NEGATIVE
KETONES UR STRIP-MCNC: NEGATIVE MG/DL
LEUKOCYTE ESTERASE UR QL STRIP: NEGATIVE
NITRATE UR QL: NEGATIVE
PH UR STRIP: 5.5 [PH] (ref 5–8)
RBC #/AREA URNS AUTO: ABNORMAL /HPF
SP GR UR STRIP: 1.01 (ref 1–1.03)
SQUAMOUS #/AREA URNS AUTO: ABNORMAL /LPF
UROBILINOGEN UR STRIP-ACNC: 0.2 E.U./DL
WBC #/AREA URNS AUTO: ABNORMAL /HPF

## 2024-06-24 PROCEDURE — 99213 OFFICE O/P EST LOW 20 MIN: CPT | Performed by: FAMILY MEDICINE

## 2024-06-24 PROCEDURE — 81001 URINALYSIS AUTO W/SCOPE: CPT | Performed by: FAMILY MEDICINE

## 2024-06-24 ASSESSMENT — PAIN SCALES - GENERAL: PAINLEVEL: NO PAIN (0)

## 2024-06-24 NOTE — PROGRESS NOTES
"Minor Murcia  /74   Pulse 65   Resp 18   Ht 1.778 m (5' 10\")   Wt 82.3 kg (181 lb 8 oz)   SpO2 98%   BMI 26.04 kg/m       Assessment/Plan:                Minor was seen today for recheck medication, hypertension, medicare visit, uti and weight loss.    Diagnoses and all orders for this visit:    Urinary frequency  -     UA with Microscopic reflex to Culture - lab collect; Future  -     UA with Microscopic reflex to Culture - lab collect         DISCUSSION  He has no finding on exam and his clinical history that he describes to me aside from urinary frequency is relatively benign.    Urine testing today shows a normal urinalysis without proteinuria hematuria or glucosuria.    I have a strong suspicion that he had fluid retention from being on the higher dose prednisone for some time leading up to the time that he was started on the hydrochlorothiazide.  I think he had a rather significant diuresis as a result of that which resulted in decreased swelling, weight loss and improved blood pressure control.    At this point I do not think any further investigation is necessary especially given that symptoms have already started to improve slightly as his prednisone dose is now decreased and he likely has had a more significant diuresis.  We will stop the hydrochlorothiazide, monitor blood pressure monitor for return of swelling or any other symptoms.  If there are further concerns he agrees to send me a message or seek evaluation as appropriate in which case we will evaluate further.  I have encouraged him to continue the amlodipine started by his cardiologist.  Subjective:     HPI:    Minor Murcia is a 64 year old male with a medical history significant for coronary disease with stent placement in 2005, 2017, hypertension hyperlipidemia and a recent diagnosis of polymyalgia rheumatica currently on prednisone.    This past spring after developing significant muscle pain he was placed on prednisone and ultimately " diagnosed with polymyalgia rheumatica.    He had a good response to 50 mg of prednisone and after seeing rheumatology it has since been tapered.    He came to see me fairly recently during the course of this diagnostic process on June 7, 2024.  He was hypertensive at the time and he was placed on hydrochlorothiazide for better blood pressure control.  Patient reports he began to experience urinary frequency including nocturia up to 6 times nightly.  He also noted a dramatic decrease in his weight over the past 2+ weeks since this time.  He denies dysuria.  He states that in the recent days his urinary frequency has tapered slightly and he reports nocturia only 3 times nightly last night.  He does noted decrease in swelling in his legs compared to prior.  He reports overall he feels well denies things like fevers chills or malaise.  His pain symptoms from the polymyalgia rheumatica remain controlled at this point in time.    It is also noted that shortly after he saw me on the seventh he saw his cardiologist on 12 June and was placed on amlodipine because of persistently high blood pressure.  He has been checking his blood pressure at home and is now noting readings in the 1 teens and sometimes as low as the 90s systolic.  He does not feel dizzy or lightheaded.    ROS:  Complete review of systems is obtained.  Other than the specific considerations noted above complete review of systems is negative.          Objective:   Medications:  Current Outpatient Medications   Medication Sig Dispense Refill    acetaminophen (TYLENOL) 160 MG TBDP Take 500 mg by mouth 2 times daily as needed for mild pain      amLODIPine (NORVASC) 5 MG tablet Take 1 tablet (5 mg) by mouth daily 90 tablet 1    aspirin 81 MG EC tablet [ASPIRIN 81 MG EC TABLET] Take 81 mg by mouth daily.      atorvastatin (LIPITOR) 80 MG tablet Take 1 tablet (80 mg) by mouth daily 90 tablet 3    ezetimibe (ZETIA) 10 MG tablet Take 1 tablet (10 mg) by mouth daily 90  tablet 3    famotidine (PEPCID) 40 MG tablet Take 1 tablet (40 mg) by mouth daily for 90 days 90 tablet 0    hydroCHLOROthiazide 12.5 MG tablet Take 1 tablet (12.5 mg) by mouth daily 30 tablet 5    Ibuprofen (ADVIL PO) Take 600 mg by mouth 3 times daily as needed for moderate pain      lisinopril (ZESTRIL) 20 MG tablet TAKE 1 TABLET(20 MG) BY MOUTH DAILY 90 tablet 0    Magnesium 400 MG TABS       metoprolol succinate ER (TOPROL XL) 25 MG 24 hr tablet Take 0.5 tablets (12.5 mg) by mouth daily      multivitamin (CENTRUM SILVER) tablet Take 1 tablet by mouth daily      nitroglycerin (NITROSTAT) 0.4 MG SL tablet [NITROGLYCERIN (NITROSTAT) 0.4 MG SL TABLET] ONE TABLET UNDER TONGUE AS NEEDED FOR CHEST PAIN MAY REPEAT EVERY 5 MINUTES X 2. IF NO RELIEF DIAL 911 25 tablet 1    predniSONE (DELTASONE) 10 MG tablet Take 3 tablets (30 mg) by mouth daily for 14 days, THEN 2.5 tablets (25 mg) daily for 14 days, THEN 2 tablets (20 mg) daily for 28 days. 133 tablet 0    sildenafil (VIAGRA) 50 MG tablet Take 1 tablet (50 mg) by mouth daily as needed (ED) 30 tablet 1    sulfamethoxazole-trimethoprim (BACTRIM) 400-80 MG tablet Take 1 tablet by mouth daily for 90 days 90 tablet 0    oxyCODONE (ROXICODONE) 5 MG tablet Take 1-2 tablets (5-10 mg) by mouth nightly as needed for severe pain (Patient not taking: Reported on 6/24/2024) 14 tablet 0     No current facility-administered medications for this visit.        Allergies:   No Known Allergies     Social History     Socioeconomic History    Marital status:      Spouse name: Not on file    Number of children: Not on file    Years of education: Not on file    Highest education level: Not on file   Occupational History    Not on file   Tobacco Use    Smoking status: Never    Smokeless tobacco: Never   Vaping Use    Vaping status: Never Used   Substance and Sexual Activity    Alcohol use: Yes     Alcohol/week: 1.0 - 2.0 standard drink of alcohol    Drug use: Yes     Frequency: 0.2  "times per week     Types: Marijuana     Comment: \"Once per month\"    Sexual activity: Not on file   Other Topics Concern    Not on file   Social History Narrative    Not on file     Social Determinants of Health     Financial Resource Strain: Not on file   Food Insecurity: Not on file   Transportation Needs: Not on file   Physical Activity: Not on file   Stress: Not on file   Social Connections: Not on file   Interpersonal Safety: Low Risk  (5/1/2024)    Interpersonal Safety     Do you feel physically and emotionally safe where you currently live?: Yes     Within the past 12 months, have you been hit, slapped, kicked or otherwise physically hurt by someone?: No     Within the past 12 months, have you been humiliated or emotionally abused in other ways by your partner or ex-partner?: No   Housing Stability: Not on file       Family History   Problem Relation Age of Onset    Cancer Mother     Coronary Artery Disease Father         Most Recent Immunizations   Administered Date(s) Administered    COVID-19 Bivalent 12+ (Pfizer) 12/07/2022    COVID-19 MONOVALENT 12+ (Pfizer) 11/22/2021    COVID-19 Monovalent 18+ (Moderna) 02/11/2021    DT (PEDS <7y) 07/16/2003    Flu, Unspecified 09/21/2020    HepA, Unspecified 07/16/2003    Hepatitis A (ADULT 19+) 07/16/2003    Influenza (H1N1) 12/18/2009    Influenza (IIV3) PF 09/03/2013    Influenza Vaccine 65+ (Fluzone HD) 08/17/2020    Influenza Vaccine >6 months,quad, PF 12/07/2022    Influenza Vaccine, 6+MO IM (QUADRIVALENT W/PRESERVATIVES) 11/22/2021    Influenza, seasonal, injectable, PF 09/12/2015    TD,PF 7+ (Tenivac) 09/20/2021    TDAP (Adacel,Boostrix) 05/20/2011    Zoster recombinant adjuvanted (SHINGRIX) 09/13/2018        Wt Readings from Last 3 Encounters:   06/24/24 82.3 kg (181 lb 8 oz)   06/12/24 82.6 kg (182 lb)   06/07/24 87.5 kg (192 lb 14.4 oz)        BP Readings from Last 6 Encounters:   06/24/24 122/74   06/12/24 (!) 153/74   06/07/24 (!) 152/74   06/06/24 (!) " "166/91   05/24/24 134/66   05/23/24 130/80        Hemoglobin A1C   Date Value Ref Range Status   05/01/2024 5.8 (H) 0.0 - 5.6 % Final     Comment:     Normal <5.7%   Prediabetes 5.7-6.4%    Diabetes 6.5% or higher     Note: Adopted from ADA consensus guidelines.              PHYSICAL EXAM:    /74   Pulse 65   Resp 18   Ht 1.778 m (5' 10\")   Wt 82.3 kg (181 lb 8 oz)   SpO2 98%   BMI 26.04 kg/m           General Appearance:    Alert, cooperative, no distress   Eyes:   No scleral icterus or conjunctival irritation       Lungs:     Clear to auscultation bilaterally, respirations unlabored, no wheezes or crackles   Heart:    Regular rate and rhythm,  No murmur   Extremities:  No edema, no joint swelling or redness, no evidence of any injuries   Skin:  No concerning skin findings, no suspicious moles, no rashes   Neurologic:  On gross examination there is no motor or sensory deficit.  Patient walks with a normal gait                                     Answers submitted by the patient for this visit:  General Questionnaire (Submitted on 6/20/2024)  Chief Complaint: Chronic problems general questions HPI Form  How many servings of fruits and vegetables do you eat daily?: 2-3  On average, how many sweetened beverages do you drink each day (Examples: soda, juice, sweet tea, etc.  Do NOT count diet or artificially sweetened beverages)?: 0  How many minutes a day do you exercise enough to make your heart beat faster?: 9 or less  How many days a week do you exercise enough to make your heart beat faster?: 3 or less  How many days per week do you miss taking your medication?: 0  General Concern (Submitted on 6/20/2024)  Chief Complaint: Chronic problems general questions HPI Form  What is the reason for your visit today?: Increase urination and weight loss  When did your symptoms begin?: 1-2 weeks ago  What are your symptoms?: Increase urination and weight loss  How would you describe these symptoms?: Severe  Are " your symptoms:: Staying the same  Have you had these symptoms before?: No

## 2024-07-03 ENCOUNTER — LAB (OUTPATIENT)
Dept: LAB | Facility: CLINIC | Age: 64
End: 2024-07-03
Payer: COMMERCIAL

## 2024-07-03 DIAGNOSIS — M35.3 PMR (POLYMYALGIA RHEUMATICA) (H): ICD-10-CM

## 2024-07-03 LAB
AST SERPL W P-5'-P-CCNC: 54 U/L (ref 0–45)
BASOPHILS # BLD AUTO: 0 10E3/UL (ref 0–0.2)
BASOPHILS NFR BLD AUTO: 0 %
CREAT SERPL-MCNC: 1.35 MG/DL (ref 0.67–1.17)
CRP SERPL-MCNC: <3 MG/L
EGFRCR SERPLBLD CKD-EPI 2021: 58 ML/MIN/1.73M2
EOSINOPHIL # BLD AUTO: 0 10E3/UL (ref 0–0.7)
EOSINOPHIL NFR BLD AUTO: 0 %
ERYTHROCYTE [DISTWIDTH] IN BLOOD BY AUTOMATED COUNT: 15.2 % (ref 10–15)
ERYTHROCYTE [SEDIMENTATION RATE] IN BLOOD BY WESTERGREN METHOD: 6 MM/HR (ref 0–20)
HCT VFR BLD AUTO: 39.3 % (ref 40–53)
HGB BLD-MCNC: 12.9 G/DL (ref 13.3–17.7)
IMM GRANULOCYTES # BLD: 0.1 10E3/UL
IMM GRANULOCYTES NFR BLD: 1 %
LYMPHOCYTES # BLD AUTO: 0.8 10E3/UL (ref 0.8–5.3)
LYMPHOCYTES NFR BLD AUTO: 7 %
MCH RBC QN AUTO: 29.3 PG (ref 26.5–33)
MCHC RBC AUTO-ENTMCNC: 32.8 G/DL (ref 31.5–36.5)
MCV RBC AUTO: 89 FL (ref 78–100)
MONOCYTES # BLD AUTO: 0.9 10E3/UL (ref 0–1.3)
MONOCYTES NFR BLD AUTO: 7 %
NEUTROPHILS # BLD AUTO: 10.3 10E3/UL (ref 1.6–8.3)
NEUTROPHILS NFR BLD AUTO: 85 %
NRBC # BLD AUTO: 0 10E3/UL
NRBC BLD AUTO-RTO: 0 /100
PLATELET # BLD AUTO: 273 10E3/UL (ref 150–450)
RBC # BLD AUTO: 4.41 10E6/UL (ref 4.4–5.9)
WBC # BLD AUTO: 12 10E3/UL (ref 4–11)

## 2024-07-03 PROCEDURE — 86140 C-REACTIVE PROTEIN: CPT

## 2024-07-03 PROCEDURE — 85025 COMPLETE CBC W/AUTO DIFF WBC: CPT

## 2024-07-03 PROCEDURE — 85652 RBC SED RATE AUTOMATED: CPT

## 2024-07-03 PROCEDURE — 84450 TRANSFERASE (AST) (SGOT): CPT

## 2024-07-03 PROCEDURE — 82565 ASSAY OF CREATININE: CPT

## 2024-07-03 PROCEDURE — 36415 COLL VENOUS BLD VENIPUNCTURE: CPT

## 2024-07-08 DIAGNOSIS — N17.9 AKI (ACUTE KIDNEY INJURY) (H): Primary | ICD-10-CM

## 2024-07-10 ENCOUNTER — OFFICE VISIT (OUTPATIENT)
Dept: RHEUMATOLOGY | Facility: CLINIC | Age: 64
End: 2024-07-10
Attending: STUDENT IN AN ORGANIZED HEALTH CARE EDUCATION/TRAINING PROGRAM
Payer: COMMERCIAL

## 2024-07-10 ENCOUNTER — LAB (OUTPATIENT)
Dept: LAB | Facility: CLINIC | Age: 64
End: 2024-07-10
Payer: COMMERCIAL

## 2024-07-10 VITALS
HEART RATE: 60 BPM | WEIGHT: 180 LBS | BODY MASS INDEX: 25.83 KG/M2 | DIASTOLIC BLOOD PRESSURE: 76 MMHG | SYSTOLIC BLOOD PRESSURE: 131 MMHG | OXYGEN SATURATION: 99 %

## 2024-07-10 DIAGNOSIS — M35.3 PMR (POLYMYALGIA RHEUMATICA) (H): ICD-10-CM

## 2024-07-10 DIAGNOSIS — N17.9 AKI (ACUTE KIDNEY INJURY) (H): ICD-10-CM

## 2024-07-10 DIAGNOSIS — M85.859 OSTEOPENIA OF HIP, UNSPECIFIED LATERALITY: Primary | ICD-10-CM

## 2024-07-10 DIAGNOSIS — M13.0 POLYARTHRITIS: ICD-10-CM

## 2024-07-10 LAB
AST SERPL W P-5'-P-CCNC: 30 U/L (ref 0–45)
BASOPHILS # BLD AUTO: 0 10E3/UL (ref 0–0.2)
BASOPHILS NFR BLD AUTO: 0 %
CREAT SERPL-MCNC: 1.07 MG/DL (ref 0.67–1.17)
CRP SERPL-MCNC: <3 MG/L
EGFRCR SERPLBLD CKD-EPI 2021: 77 ML/MIN/1.73M2
EOSINOPHIL # BLD AUTO: 0 10E3/UL (ref 0–0.7)
EOSINOPHIL NFR BLD AUTO: 0 %
ERYTHROCYTE [DISTWIDTH] IN BLOOD BY AUTOMATED COUNT: 15.4 % (ref 10–15)
ERYTHROCYTE [SEDIMENTATION RATE] IN BLOOD BY WESTERGREN METHOD: 6 MM/HR (ref 0–20)
HCT VFR BLD AUTO: 39.2 % (ref 40–53)
HGB BLD-MCNC: 12.6 G/DL (ref 13.3–17.7)
IMM GRANULOCYTES # BLD: 0 10E3/UL
IMM GRANULOCYTES NFR BLD: 0 %
LYMPHOCYTES # BLD AUTO: 0.6 10E3/UL (ref 0.8–5.3)
LYMPHOCYTES NFR BLD AUTO: 6 %
MCH RBC QN AUTO: 29.6 PG (ref 26.5–33)
MCHC RBC AUTO-ENTMCNC: 32.1 G/DL (ref 31.5–36.5)
MCV RBC AUTO: 92 FL (ref 78–100)
MONOCYTES # BLD AUTO: 0.4 10E3/UL (ref 0–1.3)
MONOCYTES NFR BLD AUTO: 4 %
NEUTROPHILS # BLD AUTO: 8 10E3/UL (ref 1.6–8.3)
NEUTROPHILS NFR BLD AUTO: 89 %
PLATELET # BLD AUTO: 189 10E3/UL (ref 150–450)
RBC # BLD AUTO: 4.25 10E6/UL (ref 4.4–5.9)
WBC # BLD AUTO: 9 10E3/UL (ref 4–11)

## 2024-07-10 PROCEDURE — 36415 COLL VENOUS BLD VENIPUNCTURE: CPT

## 2024-07-10 PROCEDURE — 85025 COMPLETE CBC W/AUTO DIFF WBC: CPT

## 2024-07-10 PROCEDURE — 82565 ASSAY OF CREATININE: CPT

## 2024-07-10 PROCEDURE — 84450 TRANSFERASE (AST) (SGOT): CPT

## 2024-07-10 PROCEDURE — 99214 OFFICE O/P EST MOD 30 MIN: CPT | Performed by: STUDENT IN AN ORGANIZED HEALTH CARE EDUCATION/TRAINING PROGRAM

## 2024-07-10 PROCEDURE — G2211 COMPLEX E/M VISIT ADD ON: HCPCS | Performed by: STUDENT IN AN ORGANIZED HEALTH CARE EDUCATION/TRAINING PROGRAM

## 2024-07-10 PROCEDURE — 86140 C-REACTIVE PROTEIN: CPT

## 2024-07-10 PROCEDURE — 99213 OFFICE O/P EST LOW 20 MIN: CPT | Performed by: STUDENT IN AN ORGANIZED HEALTH CARE EDUCATION/TRAINING PROGRAM

## 2024-07-10 PROCEDURE — 85652 RBC SED RATE AUTOMATED: CPT

## 2024-07-10 RX ORDER — PREDNISONE 5 MG/1
TABLET ORAL
Qty: 91 TABLET | Refills: 0 | Status: SHIPPED | OUTPATIENT
Start: 2024-08-07 | End: 2024-09-04

## 2024-07-10 RX ORDER — ALENDRONATE SODIUM 70 MG/1
70 TABLET ORAL
Qty: 12 TABLET | Refills: 0 | Status: SHIPPED | OUTPATIENT
Start: 2024-07-10 | End: 2024-09-04

## 2024-07-10 ASSESSMENT — ENCOUNTER SYMPTOMS
DOUBLE VISION: 0
HEADACHES: 0
BLURRED VISION: 0
BACK PAIN: 0
NECK PAIN: 0
WEIGHT LOSS: 0
COUGH: 0
MYALGIAS: 0
SHORTNESS OF BREATH: 0
FEVER: 0
CLAUDICATION: 0
PHOTOPHOBIA: 0

## 2024-07-10 ASSESSMENT — PAIN SCALES - GENERAL: PAINLEVEL: NO PAIN (0)

## 2024-07-10 NOTE — PROGRESS NOTES
RHEUMATOLOGY OUTPATIENT CLINIC NOTE     Referring Provider: Valdez Woodson MD  [address] [place] [address]    Lab review   RF:  Negative       Imaging review   X-ray hip bilateral, 3/2023: Degenerative changes    Subjective     Visit date July 10, 2024    Minor is a 64 year old male who presents today for follow up.    Since the last visit,    - Workup on 7/3/2024 showed: Normalized ESR, CRP, slightly elevated creatinine and AST, improved hemoglobin, WBC count and normal platelets.    Today, Minor mentioned the following:  He is doing overall well. He denies shoulder or hip stiffness  He is having insomnia from prednisone   He is eating healthier currently    Review of Systems   Constitutional:  Negative for fever and weight loss.   Eyes:  Negative for blurred vision, double vision and photophobia.   Respiratory:  Negative for cough and shortness of breath.    Cardiovascular:  Negative for claudication.   Musculoskeletal:  Negative for back pain, joint pain, myalgias and neck pain.   Neurological:  Negative for headaches.        Negative for Scalp tenderness, Jaw claudications       Current Medications   Prednisone 20 mg for 4 days     Past Medical history   Past Medical History:   Diagnosis Date    Coronary artery disease     High cholesterol     Hypertension     Myocardial infarction (H) 2005       Objective   /76 (BP Location: Left arm, Patient Position: Sitting, Cuff Size: Adult Regular)   Pulse 60   Wt 81.6 kg (180 lb)   SpO2 99%   BMI 25.83 kg/m        PHYSICAL EXAMINATION  Physical Exam  HENT:      Mouth/Throat:      Mouth: Mucous membranes are moist.   Eyes:      Conjunctiva/sclera: Conjunctivae normal.   Cardiovascular:      Heart sounds: Normal heart sounds.   Pulmonary:      Effort: Pulmonary effort is normal.      Breath sounds: Normal breath sounds.   Musculoskeletal:         General: No swelling or tenderness.   Skin:     Findings: No rash.   Neurological:      Mental  Status: He is alert.         Assessment & Plan     # Polymyalgia Rheumatica   # Degenerative arthritis in the knees  # Multiple comorbidities [coronary artery disease, hypertension, hyperlipidemia]  # Prednisone use, chronic   # Screening for chronic infections   # Longitudinal care    Minor is following with rheumatology for polymyalgia rheumatica.  Onset: 2024  Involvement:  Bilateral shoulder and hip stiffness, elevated inflammatory markers  No symptoms suggestive of GCA, no peripheral inflammatory arthropathy.  Comorbid conditions: Coronary artery disease, hypertension, hypercholesterolemia.  Treatment included: Prednisone    # Polymyalgia Rheumatica   He is overall doing well from PMR standpoint and he is asymptomatic.  He however is experiencing insomnia from prednisone as well as elevated blood pressure.    There is no evidence of active arthritis today on exam.  Labs show normalized inflammatory markers.    Plan:  - Taper prednisone as below to avoid steroid side effects  - Continue to monitor symptoms    Take prednisone as prescribed below:  Start with Prednisone 20 mg daily for 2 weeks  - Then Prednisone 15 mg daily for 2 weeks   - Then Prednisone 12.5 mg daily for 2 weeks   - Then Prednisone 10 mg daily for 2 weeks till the next follow up appointment     # Acute kidney injury  # Elevated AST  He started new medications by his primary care provider for BP control. I suspect this is either related to BP medications or bactrim.     Plan:  - Repeat Creatinine and AST  - Stop bactrim and Pepcid for now till results come back    # Prednisone use, chronic   # Screening for glucocorticoid induced osteoporosis   Vitamin D, 47 (2024)  DEXA scan: Osteopenia, major fracture risk 11.6%], hip fracture risk 2.6%.     Plan:  - Initiate alendronate 70 mg weeks [risks and side effects discussed at length, additional information added to the after visit summary ]  - Continue vitamin D supplementation  - [facility] pharmacy referral      # Screening for chronic infections  2024  Hepatitis B surface antigen: Negative  Hepatitis B core: Negative  Hepatitis B surface antibody: Negative   Hepatitis C antibody: Negative   QuantiFERON gold: Negative     # Longitudinal care  The longitudinal plan of care for the diagnosis(es)/condition(s) Polymyalgia  as documented were addressed during this visit. Due to the added complexity in care, I will continue to support Minor in the subsequent management and with ongoing continuity of care.            Return in about 2 months (around 9/10/2024) for Follow up.    Valdez Woodson MD  [facility] RHEUMATOLOGY

## 2024-07-10 NOTE — NURSING NOTE
Chief Complaint   Patient presents with    RECHECK     /76 (BP Location: Left arm, Patient Position: Sitting, Cuff Size: Adult Regular)   Pulse 60   Wt 81.6 kg (180 lb)   SpO2 99%   BMI 25.83 kg/m

## 2024-07-10 NOTE — LETTER
7/10/2024       RE: Minor Murcia  [address] [place] [id number]     Dear Colleague,    Thank you for referring your patient, Minor Murcia, to the [facility] RHEUMATOLOGY at [facility]. Please see a copy of my visit note below.    RHEUMATOLOGY OUTPATIENT CLINIC NOTE     Referring Provider: Valdez Woodson MD  [address] [place] [address]    Lab review   RF:  Negative       Imaging review   X-ray hip bilateral, 3/2023: Degenerative changes    Subjective    Visit date July 10, 2024    Minor is a 64 year old male who presents today for follow up.    Since the last visit,    - Workup on 7/3/2024 showed: Normalized ESR, CRP, slightly elevated creatinine and AST, improved hemoglobin, WBC count and normal platelets.    Today, Minor mentioned the following:  He is doing overall well. He denies shoulder or hip stiffness  He is having insomnia from prednisone   He is eating healthier currently    Review of Systems   Constitutional:  Negative for fever and weight loss.   Eyes:  Negative for blurred vision, double vision and photophobia.   Respiratory:  Negative for cough and shortness of breath.    Cardiovascular:  Negative for claudication.   Musculoskeletal:  Negative for back pain, joint pain, myalgias and neck pain.   Neurological:  Negative for headaches.        Negative for Scalp tenderness, Jaw claudications       Current Medications   Prednisone 20 mg for 4 days     Past Medical history   Past Medical History:   Diagnosis Date    Coronary artery disease     High cholesterol     Hypertension     Myocardial infarction (H) 2005       Objective  /76 (BP Location: Left arm, Patient Position: Sitting, Cuff Size: Adult Regular)   Pulse 60   Wt 81.6 kg (180 lb)   SpO2 99%   BMI 25.83 kg/m        PHYSICAL EXAMINATION  Physical Exam  HENT:      Mouth/Throat:      Mouth: Mucous membranes are moist.   Eyes:      Conjunctiva/sclera: Conjunctivae  normal.   Cardiovascular:      Heart sounds: Normal heart sounds.   Pulmonary:      Effort: Pulmonary effort is normal.      Breath sounds: Normal breath sounds.   Musculoskeletal:         General: No swelling or tenderness.   Skin:     Findings: No rash.   Neurological:      Mental Status: He is alert.         Assessment & Plan    # Polymyalgia Rheumatica   # Degenerative arthritis in the knees  # Multiple comorbidities [coronary artery disease, hypertension, hyperlipidemia]  # Prednisone use, chronic   # Screening for chronic infections   # Longitudinal care    Minor is following with rheumatology for polymyalgia rheumatica.  Onset: 2024  Involvement:  Bilateral shoulder and hip stiffness, elevated inflammatory markers  No symptoms suggestive of GCA, no peripheral inflammatory arthropathy.  Comorbid conditions: Coronary artery disease, hypertension, hypercholesterolemia.  Treatment included: Prednisone    # Polymyalgia Rheumatica   He is overall doing well from PMR standpoint and he is asymptomatic.  He however is experiencing insomnia from prednisone as well as elevated blood pressure.    There is no evidence of active arthritis today on exam.  Labs show normalized inflammatory markers.    Plan:  - Taper prednisone as below to avoid steroid side effects  - Continue to monitor symptoms    Take prednisone as prescribed below:  Start with Prednisone 20 mg daily for 2 weeks  - Then Prednisone 15 mg daily for 2 weeks   - Then Prednisone 12.5 mg daily for 2 weeks   - Then Prednisone 10 mg daily for 2 weeks till the next follow up appointment     # Acute kidney injury  # Elevated AST  He started new medications by his primary care provider for BP control. I suspect this is either related to BP medications or bactrim.     Plan:  - Repeat Creatinine and AST  - Stop bactrim and Pepcid for now till results come back    # Prednisone use, chronic   # Screening for glucocorticoid induced osteoporosis   Vitamin D, 47  (2024)  DEXA scan: Osteopenia, major fracture risk 11.6%], hip fracture risk 2.6%.     Plan:  - Initiate alendronate 70 mg weeks [risks and side effects discussed at length, additional information added to the after visit summary ]  - Continue vitamin D supplementation  - [facility] pharmacy referral     # Screening for chronic infections  2024  Hepatitis B surface antigen: Negative  Hepatitis B core: Negative  Hepatitis B surface antibody: Negative   Hepatitis C antibody: Negative   QuantiFERON gold: Negative     # Longitudinal care  The longitudinal plan of care for the diagnosis(es)/condition(s) Polymyalgia  as documented were addressed during this visit. Due to the added complexity in care, I will continue to support Minor in the subsequent management and with ongoing continuity of care.            Return in about 2 months (around 9/10/2024) for Follow up.    Valdez Woodson MD  [facility] RHEUMATOLOGY

## 2024-07-10 NOTE — PATIENT INSTRUCTIONS
Our plan for today is:     Medications:    Start prednisone 20 mg daily for the next 2 weeks then 15 mg daily for the next 2 weeks then 12.5 mg daily for 2 weeks then 10 mg daily till the next follow-up appointment.    Start alendronate once weekly

## 2024-07-22 ENCOUNTER — VIRTUAL VISIT (OUTPATIENT)
Dept: RHEUMATOLOGY | Facility: CLINIC | Age: 64
End: 2024-07-22
Attending: STUDENT IN AN ORGANIZED HEALTH CARE EDUCATION/TRAINING PROGRAM
Payer: COMMERCIAL

## 2024-07-22 DIAGNOSIS — I25.10 CARDIOVASCULAR DISEASE: Primary | ICD-10-CM

## 2024-07-22 DIAGNOSIS — Z78.9 TAKES DIETARY SUPPLEMENTS: ICD-10-CM

## 2024-07-22 DIAGNOSIS — I10 ESSENTIAL (PRIMARY) HYPERTENSION: ICD-10-CM

## 2024-07-22 DIAGNOSIS — E78.5 HYPERLIPIDEMIA, UNSPECIFIED HYPERLIPIDEMIA TYPE: ICD-10-CM

## 2024-07-22 DIAGNOSIS — M85.859 OSTEOPENIA OF HIP, UNSPECIFIED LATERALITY: Primary | ICD-10-CM

## 2024-07-22 DIAGNOSIS — Z71.85 VACCINE COUNSELING: ICD-10-CM

## 2024-07-22 DIAGNOSIS — M35.3 PMR (POLYMYALGIA RHEUMATICA) (H): ICD-10-CM

## 2024-07-22 RX ORDER — NITROGLYCERIN 0.4 MG/1
TABLET SUBLINGUAL
Qty: 25 TABLET | Refills: 1 | Status: SHIPPED | OUTPATIENT
Start: 2024-07-22

## 2024-07-22 RX ORDER — FAMOTIDINE 40 MG/1
40 TABLET, FILM COATED ORAL DAILY
COMMUNITY
End: 2024-09-04

## 2024-07-22 RX ORDER — SULFAMETHOXAZOLE AND TRIMETHOPRIM 400; 80 MG/1; MG/1
1 TABLET ORAL DAILY
COMMUNITY
End: 2024-09-04

## 2024-07-22 NOTE — Clinical Note
7/22/2024       RE: Minor Murcia  [address] [place] [id number]     Dear Colleague,    Thank you for referring your patient, Minor Murcia, to the [facility] [place] at [facility]. Please see a copy of my visit note below.    Medication Therapy Management (MTM) Encounter    ASSESSMENT:                            Medication Adherence/Access: {adherencechoices:964351}    Osteopenia:   {bone health goals:089708} :    Polymyalgia rheumatica (PMR):    Hypertension:   {Status:014569}  Hyperlipidemia:   {lipidassessment:734597}  Supplements:  {Status:182842}  Vaccines: Per ACIP guidelines, patient is eligible for Pneumococcal (Prevnar 20)     PLAN:                            Start alendronate 70 mg by mouth once daily  Start calcium carbonate 6000 mg once daily opposite when you take the MVI***  Request refill of ntg  Real will Could stop ***  Consider receiving Prevnar 20 (pneumococcal) vaccination    Follow-up: as neede    SUBJECTIVE/OBJECTIVE:                          Minor Murcia is a 64 year old male seen for an initial visit. He was referred to me from Valdez Woodson MD.      Reason for visit: Alendronate new start for osteopenia and comprehensive medication review.    Allergies/ADRs: Reviewed in chart  Past Medical History: Reviewed in chart  Tobacco: He reports that he has never smoked. He has never used smokeless tobacco.  Alcohol: not currently using  Caffeine: 1 cup of coffee every other day    Medication Adherence/Access: {fumedadherence:008777}    Osteopenia:   Alendronate (Fosamax) 70mg weekly (has been on current therapy done 2 doses***)    Patient is not experiencing side effects.  DEXA History: ***  Patient is getting {dietcalciumchoice:720742}. [place] milk, MVI has 210 mg calcium and 1000 units vitamin D  Risk factors: chronic corticosteroid use  ***    Polymyalgia rheumatica (PMR):  Prednisone taper - current dose: 15 mg  daily  Bactrim 400/80 mg once daily for PJP prophylaxis  Famotidine 40 mg once daily    Noticing more pain in his knees as his prednisone dose is decreased. Is curious if he can get cortisone shots in his knees at [facility].***    Hypertension  Amlodipine 5 mg once daily  Metoprolol succinate ER 12.5 mg once daily  Lisinopril 20 mg once daily    Patient reports no current medication side effects  Patient self monitors blood pressure.  Home BP monitoring checks it twice a day. Last reading was 104/65 mmHg .       Hyperlipidemia  Atorvastatin 80 mg daily  Ezetimibe (Zetia) 10 mg once daily    Patient reports no significant myalgias or other side effects.     Supplements  Magnesium 400 mg once daily  Multivitamin once daily    {supplement:277761}     Vaccines:  Immunization History   Administered Date(s) Administered    COVID-19 Bivalent 12+ (Pfizer) 12/07/2022    COVID-19 MONOVALENT 12+ (Pfizer) 11/22/2021    COVID-19 Monovalent 18+ (Moderna) 01/14/2021, 02/11/2021    DT (PEDS <7y) 07/16/2003    Flu, Unspecified 11/07/2007, 08/26/2016, 09/24/2016, 09/21/2020    HepA, Unspecified 07/16/2003    Hepatitis A (ADULT 19+) 07/16/2003    Influenza (H1N1) 12/18/2009    Influenza (IIV3) PF 11/18/2005, 11/21/2006, 11/07/2007, 08/22/2012, 09/03/2013    Influenza Vaccine 65+ (Fluzone HD) 08/17/2020    Influenza Vaccine >6 months,quad, PF 08/25/2016, 09/13/2018, 08/01/2019, 12/07/2022    Influenza Vaccine, 6+MO IM (QUADRIVALENT W/PRESERVATIVES) 10/01/2017, 11/22/2021    Influenza, seasonal, injectable, PF 08/12/2010, 09/12/2015    TD,PF 7+ (Tenivac) 09/20/2021    TDAP (Adacel,Boostrix) 05/20/2011    Zoster recombinant adjuvanted (SHINGRIX) 03/07/2018, 09/13/2018     Today's Vitals: There were no vitals taken for this visit.  ----------------    I spent {[facility] total time 3:812037} with this patient today. All changes were made via collaborative practice agreement with Valdez Woodson A copy of the visit note was provided  to the patient's provider(s).    A summary of these recommendations was sent via [employer].    Real Neri, PharmD  Medication Therapy Management Pharmacist  [facility]  Phone: [phone]    Telemedicine Visit Details  Type of service:  Telephone visit  Start Time: 10:31 AM  End Time: 11:03 AM     Medication Therapy Recommendations  No medication therapy recommendations to display       Medication Therapy Management (MTM) Encounter    ASSESSMENT:                            Medication Adherence/Access: No issues identified    Osteopenia: Stable. Should repeat DEXA approximately 7/2026. Patient is not meeting RDI of calcium 1000mg/day. Patient would benefit from additional supplementation with calcium carbonate 600 mg once daily.    Polymyalgia rheumatica (PMR): Recommend continue prednisone taper as directed. Discussed how Kevzara might be an option in place of prednisone in the future. Will verify with his rheumatologist if he needs to continue Bactrim for PJP prophylaxis as his dose of prednisone is <20 mg/day.    Hypertension: Stable. Patient is meeting blood pressure goal of < 130/80mmHg.    Hyperlipidemia:   Stable. Patient will be due for fasting labs in 5/1/2025.    Supplements: Patient would benefit from starting calcium carbonate 600 mg once daily.    Vaccines: Per ACIP guidelines, patient is eligible for Pneumococcal (Prevnar 20)     PLAN:                            Start alendronate 70 mg by mouth once daily  Start calcium carbonate 600 mg once daily. Take this at least 4 hours before or after your multivitamin.  Request refill of ntg  Real will Could stop ***  Consider receiving Prevnar 20 (pneumococcal) vaccination    Follow-up: as neede    SUBJECTIVE/OBJECTIVE:                          Minor Murcia is a 64 year old male seen for an initial visit. He was referred to me from Valdez Woodson MD.      Reason for visit: Alendronate new start for osteopenia and comprehensive  medication review.    Allergies/ADRs: Reviewed in chart  Past Medical History: Reviewed in chart  Tobacco: He reports that he has never smoked. He has never used smokeless tobacco.  Alcohol: not currently using  Caffeine: 1 cup of coffee every other day    Medication Adherence/Access: {[facility]:840895}    Osteopenia:   Alendronate (Fosamax) 70mg weekly (has been on current therapy done 2 doses***)    Patient is not experiencing side effects.  DEXA History: ***  Patient is getting {dietcalciumchoice:270532}. [place] milk, MVI has 210 mg calcium and 1000 units vitamin D  Risk factors: chronic corticosteroid use  ***    Polymyalgia rheumatica (PMR):  Prednisone taper - current dose: 15 mg daily  Bactrim 400/80 mg once daily for PJP prophylaxis  Famotidine 40 mg once daily    Noticing more pain in his knees as his prednisone dose is decreased. Is curious if he can get cortisone shots in his knees at [facility].***    Hypertension  Amlodipine 5 mg once daily  Metoprolol succinate ER 12.5 mg once daily  Lisinopril 20 mg once daily    Patient reports no current medication side effects  Patient self monitors blood pressure.  Home BP monitoring checks it twice a day. Last reading was 104/65 mmHg .       Hyperlipidemia  Atorvastatin 80 mg daily  Ezetimibe (Zetia) 10 mg once daily    Patient reports no significant myalgias or other side effects.     Supplements  Magnesium 400 mg once daily  Multivitamin once daily    {supplement:562254}     Vaccines:  Immunization History   Administered Date(s) Administered     COVID-19 Bivalent 12+ (Pfizer) 12/07/2022     COVID-19 MONOVALENT 12+ (Pfizer) 11/22/2021     COVID-19 Monovalent 18+ (Moderna) 01/14/2021, 02/11/2021     DT (PEDS <7y) 07/16/2003     Flu, Unspecified 11/07/2007, 08/26/2016, 09/24/2016, 09/21/2020     HepA, Unspecified 07/16/2003     Hepatitis A (ADULT 19+) 07/16/2003     Influenza (H1N1) 12/18/2009     Influenza (IIV3) PF 11/18/2005, 11/21/2006,  11/07/2007, 08/22/2012, 09/03/2013     Influenza Vaccine 65+ (Fluzone HD) 08/17/2020     Influenza Vaccine >6 months,quad, PF 08/25/2016, 09/13/2018, 08/01/2019, 12/07/2022     Influenza Vaccine, 6+MO IM (QUADRIVALENT W/PRESERVATIVES) 10/01/2017, 11/22/2021     Influenza, seasonal, injectable, PF 08/12/2010, 09/12/2015     TD,PF 7+ (Tenivac) 09/20/2021     TDAP (Adacel,Boostrix) 05/20/2011     Zoster recombinant adjuvanted (SHINGRIX) 03/07/2018, 09/13/2018     Today's Vitals: There were no vitals taken for this visit.  ----------------    I spent {[facility] total time 3:953796} with this patient today. All changes were made via collaborative practice agreement with Valdez Woodson. A copy of the visit note was provided to the patient's provider(s).    A summary of these recommendations was sent via [employer].    Real Neri, PharmD  Medication Therapy Management Pharmacist  [facility]  Phone: [phone]    Telemedicine Visit Details  Type of service:  Telephone visit  Start Time: 10:31 AM  End Time: 11:03 AM     Medication Therapy Recommendations  No medication therapy recommendations to display         Again, thank you for allowing me to participate in the care of your patient.      Sincerely,    Real Neri CAN

## 2024-07-22 NOTE — PATIENT INSTRUCTIONS
"Recommendations from today's MTM visit:                                                    MTM (medication therapy management) is a service provided by a clinical pharmacist designed to help you get the most of out of your medicines.   Today we reviewed what your medicines are for, how to know if they are working, that your medicines are safe and how to make your medicine regimen as easy as possible.      Continue current medication regimen  Start calcium carbonate 600 mg once daily. Take this at least 4 hours before or after your multivitamin.  Real will request refill of nitroglycerin from Dr. Tabitha Noble will discuss Bactrim and famotidine therapy with Dr. Woodson  Consider receiving Prevnar 20 (pneumococcal) vaccination    Follow-up: as needed    It was great speaking with you today.  I value your experience and would be very thankful for your time in providing feedback in our clinic survey. In the next few days, you may receive an email or text message from [employer] with a link to a survey related to your  clinical pharmacist.\"     To schedule another MTM appointment, please call the clinic directly or you may call the MTM scheduling line at [phone] or toll-free at [phone].     My Clinical Pharmacist's contact information:                                                      Please feel free to contact me with any questions or concerns you have.      Real Neri, PharmD  Medication Therapy Management Pharmacist  [facility]  Phone: [phone]   "

## 2024-07-22 NOTE — Clinical Note
Detail Level: Detailed He is tolerating alendronate well (completed 2 doses so far) and will be starting a calcium supplement as well.  Do you want him to continue Bactrim? His prednisone dose is 15 mg/day.  Real

## 2024-07-22 NOTE — PROGRESS NOTES
Medication Therapy Management (MTM) Encounter    ASSESSMENT:                            Medication Adherence/Access: No issues identified    Osteopenia: Stable. Should repeat DEXA approximately 7/2026. Patient is not meeting RDI of calcium 1000mg/day. Patient would benefit from additional supplementation with calcium carbonate 600 mg once daily.    Polymyalgia rheumatica (PMR): Recommend continue prednisone taper as directed. Discussed how Kevzara might be an option in place of prednisone in the future. Will verify with his rheumatologist if he needs to continue Bactrim for PJP prophylaxis as his dose of prednisone is <20 mg/day.    Hypertension: Stable. Patient is meeting blood pressure goal of < 130/80mmHg.    Hyperlipidemia: Stable. Patient will be due for fasting labs in 5/1/2025.    Supplements: Patient would benefit from starting calcium carbonate 600 mg once daily.    Vaccines: Per ACIP guidelines, patient is eligible for Pneumococcal (Prevnar 20)     PLAN:                            Continue current medication regimen  Start calcium carbonate 600 mg once daily. Take this at least 4 hours before or after your multivitamin.  Real will request refill of nitroglycerin from Dr. Tabitha Noble will discuss Bactrim and famotidine therapy with Dr. Woodson  Consider receiving Prevnar 20 (pneumococcal) vaccination    Follow-up: as needed    SUBJECTIVE/OBJECTIVE:                          Minor Murcia is a 64 year old male seen for an initial visit. He was referred to me from Valdez Woodson MD.      Reason for visit: Alendronate new start for osteopenia and comprehensive medication review.    Allergies/ADRs: Reviewed in chart  Past Medical History: Reviewed in chart  Tobacco: He reports that he has never smoked. He has never used smokeless tobacco.  Alcohol: not currently using  Caffeine: 1 cup of coffee every other day    Medication Adherence/Access: no issues reported    Osteopenia:   Alendronate (Fosamax) 70 mg  weekly (started 2 weeks ago)    Patient is not experiencing side effects.  DEXA History: 6/11/2024 which showed osteopenia  Patient is getting  2 serving(s)/day of calcium in their diet. He uses almond milk in his cereal for breakfast, occasionally eats cottage cheese and cheese.  Risk factors: chronic corticosteroid use    Polymyalgia rheumatica (PMR):  Prednisone taper - current dose: 15 mg daily  Bactrim 400/80 mg once daily for PJP prophylaxis  Famotidine 40 mg once daily    Reports he is noticing more pain in his knees as his prednisone dose is decreased. He also sees an orthopedic provider at [facility] and is curious if he can get cortisone shots in his knees.    Hypertension   Amlodipine 5 mg once daily  Metoprolol succinate ER 12.5 mg once daily  Lisinopril 20 mg once daily    Patient reports no current medication side effects  Patient self monitors blood pressure.  Home BP monitoring checks it twice a day. Last reading was 104/65 mmHg .       Hyperlipidemia   Atorvastatin 80 mg daily  Ezetimibe (Zetia) 10 mg once daily    Patient reports no significant myalgias or other side effects.     Supplements   Magnesium 400 mg once daily  Multivitamin once daily    No reported issues at this time. Patient is curious if he should start calcium supplementation.     Vaccines:  Immunization History   Administered Date(s) Administered    COVID-19 Bivalent 12+ (Pfizer) 12/07/2022    COVID-19 MONOVALENT 12+ (Pfizer) 11/22/2021    COVID-19 Monovalent 18+ (Moderna) 01/14/2021, 02/11/2021    DT (PEDS <7y) 07/16/2003    Flu, Unspecified 11/07/2007, 08/26/2016, 09/24/2016, 09/21/2020    HepA, Unspecified 07/16/2003    Hepatitis A (ADULT 19+) 07/16/2003    Influenza (H1N1) 12/18/2009    Influenza (IIV3) PF 11/18/2005, 11/21/2006, 11/07/2007, 08/22/2012, 09/03/2013    Influenza Vaccine 65+ (Fluzone HD) 08/17/2020    Influenza Vaccine >6 months,quad, PF 08/25/2016, 09/13/2018, 08/01/2019, 12/07/2022    Influenza Vaccine,  6+MO IM (QUADRIVALENT W/PRESERVATIVES) 10/01/2017, 11/22/2021    Influenza, seasonal, injectable, PF 08/12/2010, 09/12/2015    TD,PF 7+ (Tenivac) 09/20/2021    TDAP (Adacel,Boostrix) 05/20/2011    Zoster recombinant adjuvanted (SHINGRIX) 03/07/2018, 09/13/2018     Today's Vitals: There were no vitals taken for this visit.  ----------------    I spent 32 minutes with this patient today. All changes were made via collaborative practice agreement with Valdez Woodson. A copy of the visit note was provided to the patient's provider(s).    A summary of these recommendations was sent via [employer].    Real Neri, PharmD  Medication Therapy Management Pharmacist  [facility]  Phone: [phone]    Telemedicine Visit Details  Type of service:  Telephone visit  Start Time: 10:31 AM  End Time: 11:03 AM     Medication Therapy Recommendations  Osteopenia of hip, unspecified laterality    Rationale: Synergistic therapy - Needs additional medication therapy - Indication   Recommendation: Start Medication - Calcium 600+D 600-10 MG-MCG Tabs   Status: Patient Agreed - Adherence/Education   Note: Plans to buy OTC         PMR (polymyalgia rheumatica) (H24)    Current Medication: famotidine (PEPCID) 40 MG tablet   Rationale: No medical indication at this time - Unnecessary medication therapy - Indication   Recommendation: Discontinue Medication   Status: Contact Provider - Awaiting Response          Current Medication: sulfamethoxazole-trimethoprim (BACTRIM) 400-80 MG tablet   Rationale: No medical indication at this time - Unnecessary medication therapy - Indication   Recommendation: Discontinue Medication   Status: Contact Provider - Awaiting Response         Vaccine counseling    Rationale: Preventive therapy - Needs additional medication therapy - Indication   Recommendation: Provide Education   Status: Patient Agreed - Adherence/Education

## 2024-08-07 ENCOUNTER — TRANSFERRED RECORDS (OUTPATIENT)
Dept: HEALTH INFORMATION MANAGEMENT | Facility: CLINIC | Age: 64
End: 2024-08-07
Payer: COMMERCIAL

## 2024-08-16 ENCOUNTER — MYC REFILL (OUTPATIENT)
Dept: FAMILY MEDICINE | Facility: CLINIC | Age: 64
End: 2024-08-16
Payer: COMMERCIAL

## 2024-08-16 DIAGNOSIS — I10 ESSENTIAL HYPERTENSION: ICD-10-CM

## 2024-08-16 RX ORDER — LISINOPRIL 20 MG/1
20 TABLET ORAL DAILY
Qty: 90 TABLET | Refills: 2 | Status: SHIPPED | OUTPATIENT
Start: 2024-08-16

## 2024-08-29 ENCOUNTER — LAB (OUTPATIENT)
Dept: LAB | Facility: CLINIC | Age: 64
End: 2024-08-29
Payer: COMMERCIAL

## 2024-08-29 DIAGNOSIS — M35.3 PMR (POLYMYALGIA RHEUMATICA) (H): ICD-10-CM

## 2024-08-29 DIAGNOSIS — N17.9 AKI (ACUTE KIDNEY INJURY) (H): ICD-10-CM

## 2024-08-29 LAB
AST SERPL W P-5'-P-CCNC: 33 U/L (ref 0–45)
BASOPHILS # BLD AUTO: 0 10E3/UL (ref 0–0.2)
BASOPHILS NFR BLD AUTO: 0 %
CREAT SERPL-MCNC: 1.08 MG/DL (ref 0.67–1.17)
CRP SERPL-MCNC: <3 MG/L
EGFRCR SERPLBLD CKD-EPI 2021: 76 ML/MIN/1.73M2
EOSINOPHIL # BLD AUTO: 0.1 10E3/UL (ref 0–0.7)
EOSINOPHIL NFR BLD AUTO: 1 %
ERYTHROCYTE [DISTWIDTH] IN BLOOD BY AUTOMATED COUNT: 16 % (ref 10–15)
ERYTHROCYTE [SEDIMENTATION RATE] IN BLOOD BY WESTERGREN METHOD: 10 MM/HR (ref 0–20)
HCT VFR BLD AUTO: 36.7 % (ref 40–53)
HGB BLD-MCNC: 12 G/DL (ref 13.3–17.7)
IMM GRANULOCYTES # BLD: 0 10E3/UL
IMM GRANULOCYTES NFR BLD: 1 %
LYMPHOCYTES # BLD AUTO: 1.5 10E3/UL (ref 0.8–5.3)
LYMPHOCYTES NFR BLD AUTO: 21 %
MCH RBC QN AUTO: 29.8 PG (ref 26.5–33)
MCHC RBC AUTO-ENTMCNC: 32.7 G/DL (ref 31.5–36.5)
MCV RBC AUTO: 91 FL (ref 78–100)
MONOCYTES # BLD AUTO: 0.7 10E3/UL (ref 0–1.3)
MONOCYTES NFR BLD AUTO: 9 %
NEUTROPHILS # BLD AUTO: 4.9 10E3/UL (ref 1.6–8.3)
NEUTROPHILS NFR BLD AUTO: 68 %
NRBC # BLD AUTO: 0 10E3/UL
NRBC BLD AUTO-RTO: 0 /100
PLATELET # BLD AUTO: 196 10E3/UL (ref 150–450)
RBC # BLD AUTO: 4.03 10E6/UL (ref 4.4–5.9)
WBC # BLD AUTO: 7.2 10E3/UL (ref 4–11)

## 2024-08-29 PROCEDURE — 36415 COLL VENOUS BLD VENIPUNCTURE: CPT

## 2024-08-29 PROCEDURE — 82565 ASSAY OF CREATININE: CPT

## 2024-08-29 PROCEDURE — 85004 AUTOMATED DIFF WBC COUNT: CPT

## 2024-08-29 PROCEDURE — 85652 RBC SED RATE AUTOMATED: CPT

## 2024-08-29 PROCEDURE — 84450 TRANSFERASE (AST) (SGOT): CPT

## 2024-08-29 PROCEDURE — 86140 C-REACTIVE PROTEIN: CPT

## 2024-08-31 ENCOUNTER — MYC REFILL (OUTPATIENT)
Dept: FAMILY MEDICINE | Facility: CLINIC | Age: 64
End: 2024-08-31
Payer: COMMERCIAL

## 2024-08-31 DIAGNOSIS — I25.10 CORONARY ARTERY DISEASE INVOLVING NATIVE HEART WITHOUT ANGINA PECTORIS, UNSPECIFIED VESSEL OR LESION TYPE: ICD-10-CM

## 2024-08-31 DIAGNOSIS — I10 ESSENTIAL HYPERTENSION: ICD-10-CM

## 2024-09-03 RX ORDER — METOPROLOL SUCCINATE 25 MG/1
12.5 TABLET, EXTENDED RELEASE ORAL DAILY
Qty: 90 TABLET | Refills: 2 | Status: SHIPPED | OUTPATIENT
Start: 2024-09-03

## 2024-09-04 ENCOUNTER — OFFICE VISIT (OUTPATIENT)
Dept: RHEUMATOLOGY | Facility: CLINIC | Age: 64
End: 2024-09-04
Attending: STUDENT IN AN ORGANIZED HEALTH CARE EDUCATION/TRAINING PROGRAM
Payer: COMMERCIAL

## 2024-09-04 VITALS
SYSTOLIC BLOOD PRESSURE: 107 MMHG | WEIGHT: 181 LBS | DIASTOLIC BLOOD PRESSURE: 63 MMHG | HEIGHT: 70 IN | HEART RATE: 61 BPM | OXYGEN SATURATION: 99 % | BODY MASS INDEX: 25.91 KG/M2

## 2024-09-04 DIAGNOSIS — M35.3 PMR (POLYMYALGIA RHEUMATICA) (H): Primary | ICD-10-CM

## 2024-09-04 DIAGNOSIS — M85.859 OSTEOPENIA OF HIP, UNSPECIFIED LATERALITY: ICD-10-CM

## 2024-09-04 PROCEDURE — 99213 OFFICE O/P EST LOW 20 MIN: CPT | Performed by: STUDENT IN AN ORGANIZED HEALTH CARE EDUCATION/TRAINING PROGRAM

## 2024-09-04 PROCEDURE — G2211 COMPLEX E/M VISIT ADD ON: HCPCS | Performed by: STUDENT IN AN ORGANIZED HEALTH CARE EDUCATION/TRAINING PROGRAM

## 2024-09-04 PROCEDURE — 99214 OFFICE O/P EST MOD 30 MIN: CPT | Performed by: STUDENT IN AN ORGANIZED HEALTH CARE EDUCATION/TRAINING PROGRAM

## 2024-09-04 RX ORDER — ALENDRONATE SODIUM 70 MG/1
70 TABLET ORAL
Qty: 12 TABLET | Refills: 1 | Status: SHIPPED | OUTPATIENT
Start: 2024-09-04

## 2024-09-04 RX ORDER — PREDNISONE 1 MG/1
TABLET ORAL
Qty: 150 TABLET | Refills: 1 | Status: SHIPPED | OUTPATIENT
Start: 2024-09-04

## 2024-09-04 RX ORDER — PREDNISONE 5 MG/1
TABLET ORAL
Qty: 30 TABLET | Refills: 1 | Status: SHIPPED | OUTPATIENT
Start: 2024-09-04 | End: 2024-09-30

## 2024-09-04 ASSESSMENT — PAIN SCALES - GENERAL: PAINLEVEL: NO PAIN (0)

## 2024-09-04 ASSESSMENT — ENCOUNTER SYMPTOMS
SHORTNESS OF BREATH: 0
CLAUDICATION: 0
WEIGHT LOSS: 0
MYALGIAS: 0
FEVER: 0
NECK PAIN: 0
PHOTOPHOBIA: 0
DOUBLE VISION: 0
BLURRED VISION: 0
COUGH: 0
BACK PAIN: 0
HEADACHES: 0

## 2024-09-04 NOTE — PROGRESS NOTES
"RHEUMATOLOGY OUTPATIENT CLINIC NOTE     Referring Provider: Valdez Woodson MD    Lab review   RF:  Negative       Imaging review   X-ray hip bilateral, 3/2023: Degenerative changes    Subjective     Visit date September 4, 2024    Minor is a 64 year old male who presents today for follow up.    Since the last visit,    - Workup on 8/29/2024 showed:     Hemoglobin overall stable  WBC  unremarkable  Platelets  unremarkable  ESR  unremarkable  CRP  unremarkable  AST  unremarkable  Creatinine  unremarkable    Today, Minor mentioned the following:    He is doing overall well.  He denies shoulder or hip stiffness or pain  He is noticing some pain in both knees, activity related  He remains with insomnia since starting prednisone     Review of Systems   Constitutional:  Negative for fever and weight loss.   Eyes:  Negative for blurred vision, double vision and photophobia.   Respiratory:  Negative for cough and shortness of breath.    Cardiovascular:  Negative for claudication.   Musculoskeletal:  Negative for back pain, myalgias and neck pain.   Neurological:  Negative for headaches.        Negative for Scalp tenderness, Jaw claudications       Current Medications   Prednisone 10 mg for 10 days   Alendronate once weekly   Calcium and vitamin D supplementation    Past Medical history   Past Medical History:   Diagnosis Date    Coronary artery disease     High cholesterol     Hypertension     Myocardial infarction (H) 2005       Objective   /63   Pulse 61   Ht 1.778 m (5' 10\")   Wt 82.1 kg (181 lb)   SpO2 99%   BMI 25.97 kg/m        PHYSICAL EXAMINATION  Physical Exam  HENT:      Mouth/Throat:      Mouth: Mucous membranes are moist.   Eyes:      Conjunctiva/sclera: Conjunctivae normal.   Cardiovascular:      Heart sounds: Normal heart sounds.   Pulmonary:      Effort: Pulmonary effort is normal.      Breath sounds: Normal breath sounds.   Musculoskeletal:         General: No swelling or tenderness.   Skin:    "  Findings: No rash.   Neurological:      Mental Status: He is alert.         Assessment & Plan     # Polymyalgia Rheumatica   # Degenerative arthritis in the knees  # Multiple comorbidities [coronary artery disease, hypertension, hyperlipidemia]  # Prednisone use, chronic   # Screening for chronic infections   # Longitudinal care    Minor is following with rheumatology for polymyalgia rheumatica.  Onset: 2024  Involvement:  Bilateral shoulder and hip stiffness, elevated inflammatory markers  No symptoms suggestive of GCA, no peripheral inflammatory arthropathy.  Comorbid conditions: Coronary artery disease, hypertension, hypercholesterolemia.  Treatment included: Prednisone    # Polymyalgia Rheumatica   He is overall doing well from PMR standpoint and he is asymptomatic.    He however is experiencing insomnia from prednisone   There is no evidence of active arthritis today on exam.  Labs show normalized inflammatory markers.    We discussed lowering his prednisone using a faster prednisone taper to avoid side effects from steroids. We will start lab surveillance with inflammatory markers monthly to monitor his disease and he is advised to reach out if he notice any recurrence of symptoms.   If he develop recurrence then we will discuss either initiation of low dose prednisone vs Sarilumab use.    Plan:  - Taper prednisone as below to avoid steroid side effects  - Continue to monitor symptoms  - Inflammatory markers every month  - Stop pepcid and bactrim    Take prednisone 10 mg daily for a total of 2 weeks then  prednisone 9 mg daily for 1 week then    prednisone 8 mg daily for 1 week then    prednisone 7 mg daily for 1 week then    prednisone 6 mg daily for 1 week then    prednisone 5 mg daily for 1 week then    prednisone 4 mg daily for 1 week then    prednisone 3 mg daily for 1 week then    prednisone 2 mg daily for 1 week then    prednisone 1 mg daily for 1 week then Stop      # Prednisone use, chronic   #  Screening for glucocorticoid induced osteoporosis   Vitamin D, 47 (2024)  DEXA scan: Osteopenia, major fracture risk 11.6%], hip fracture risk 2.6%.     Plan:  - Continue alendronate 70 mg weeks  - Continue vitamin D supplementation    # Screening for chronic infections  2024  Hepatitis B surface antigen: Negative  Hepatitis B core: Negative  Hepatitis B surface antibody: Negative   Hepatitis C antibody: Negative   QuantiFERON gold: Negative     # Longitudinal care  The longitudinal plan of care for the diagnosis(es)/condition(s) Polymyalgia  as documented were addressed during this visit. Due to the added complexity in care, I will continue to support Minor in the subsequent management and with ongoing continuity of care.      33 MINUTES SPENT BY ME on the date of service doing chart review, history, exam, documentation & further activities per the note.      Return in about 3 months (around 12/4/2024) for Follow up.    Valdez Woodson MD  [facility]

## 2024-09-04 NOTE — NURSING NOTE
"Chief Complaint   Patient presents with    RECHECK     Follow Up     /63   Pulse 61   Ht 1.778 m (5' 10\")   Wt 82.1 kg (181 lb)   SpO2 99%   BMI 25.97 kg/m    Alla Dominique on 9/4/2024 at 8:52 AM    "

## 2024-09-04 NOTE — LETTER
"9/4/2024       RE: Minor Murcia  [address] [place] [id number]     Dear Colleague,    Thank you for referring your patient, Minor Murcia, to the [facility] RHEUMATOLOGY at [facility]. Please see a copy of my visit note below.    RHEUMATOLOGY OUTPATIENT CLINIC NOTE     Referring Provider: Valdez Woodson MD    Lab review   RF:  Negative       Imaging review   X-ray hip bilateral, 3/2023: Degenerative changes    Subjective    Visit date September 4, 2024    Minor is a 64 year old male who presents today for follow up.    Since the last visit,    - Workup on 8/29/2024 showed:     Hemoglobin overall stable  WBC  unremarkable  Platelets  unremarkable  ESR  unremarkable  CRP  unremarkable  AST  unremarkable  Creatinine  unremarkable    Today, Minor mentioned the following:    He is doing overall well.  He denies shoulder or hip stiffness or pain  He is noticing some pain in both knees, activity related  He remains with insomnia since starting prednisone     Review of Systems   Constitutional:  Negative for fever and weight loss.   Eyes:  Negative for blurred vision, double vision and photophobia.   Respiratory:  Negative for cough and shortness of breath.    Cardiovascular:  Negative for claudication.   Musculoskeletal:  Negative for back pain, myalgias and neck pain.   Neurological:  Negative for headaches.        Negative for Scalp tenderness, Jaw claudications       Current Medications   Prednisone 10 mg for 10 days   Alendronate once weekly   Calcium and vitamin D supplementation    Past Medical history   Past Medical History:   Diagnosis Date     Coronary artery disease      High cholesterol      Hypertension      Myocardial infarction (H) 2005       Objective  /63   Pulse 61   Ht 1.778 m (5' 10\")   Wt 82.1 kg (181 lb)   SpO2 99%   BMI 25.97 kg/m        PHYSICAL EXAMINATION  Physical Exam  HENT:      Mouth/Throat:      Mouth: Mucous membranes are " moist.   Eyes:      Conjunctiva/sclera: Conjunctivae normal.   Cardiovascular:      Heart sounds: Normal heart sounds.   Pulmonary:      Effort: Pulmonary effort is normal.      Breath sounds: Normal breath sounds.   Musculoskeletal:         General: No swelling or tenderness.   Skin:     Findings: No rash.   Neurological:      Mental Status: He is alert.         Assessment & Plan    # Polymyalgia Rheumatica   # Degenerative arthritis in the knees  # Multiple comorbidities [coronary artery disease, hypertension, hyperlipidemia]  # Prednisone use, chronic   # Screening for chronic infections   # Longitudinal care    Minor is following with rheumatology for polymyalgia rheumatica.  Onset: 2024  Involvement:  Bilateral shoulder and hip stiffness, elevated inflammatory markers  No symptoms suggestive of GCA, no peripheral inflammatory arthropathy.  Comorbid conditions: Coronary artery disease, hypertension, hypercholesterolemia.  Treatment included: Prednisone    # Polymyalgia Rheumatica   He is overall doing well from PMR standpoint and he is asymptomatic.    He however is experiencing insomnia from prednisone   There is no evidence of active arthritis today on exam.  Labs show normalized inflammatory markers.    We discussed lowering his prednisone using a faster prednisone taper to avoid side effects from steroids. We will start lab surveillance with inflammatory markers monthly to monitor his disease and he is advised to reach out if he notice any recurrence of symptoms.   If he develop recurrence then we will discuss either initiation of low dose prednisone vs Sarilumab use.    Plan:  - Taper prednisone as below to avoid steroid side effects  - Continue to monitor symptoms  - Inflammatory markers every month  - Stop pepcid and bactrim    Take prednisone 10 mg daily for a total of 2 weeks then  prednisone 9 mg daily for 1 week then    prednisone 8 mg daily for 1 week then    prednisone 7 mg daily for 1 week then     prednisone 6 mg daily for 1 week then    prednisone 5 mg daily for 1 week then    prednisone 4 mg daily for 1 week then    prednisone 3 mg daily for 1 week then    prednisone 2 mg daily for 1 week then    prednisone 1 mg daily for 1 week then Stop      # Prednisone use, chronic   # Screening for glucocorticoid induced osteoporosis   Vitamin D, 47 (2024)  DEXA scan: Osteopenia, major fracture risk 11.6%], hip fracture risk 2.6%.     Plan:  - Continue alendronate 70 mg weeks  - Continue vitamin D supplementation    # Screening for chronic infections  2024  Hepatitis B surface antigen: Negative  Hepatitis B core: Negative  Hepatitis B surface antibody: Negative   Hepatitis C antibody: Negative   QuantiFERON gold: Negative     # Longitudinal care  The longitudinal plan of care for the diagnosis(es)/condition(s) Polymyalgia  as documented were addressed during this visit. Due to the added complexity in care, I will continue to support Minor in the subsequent management and with ongoing continuity of care.      33 MINUTES SPENT BY ME on the date of service doing chart review, history, exam, documentation & further activities per the note.      Return in about 3 months (around 12/4/2024) for Follow up.    Valdez Woodson MD  [facility] RHEUMATOLOGY      Again, thank you for allowing me to participate in the care of your patient.      Sincerely,    Valdez Woodson MD

## 2024-09-04 NOTE — PATIENT INSTRUCTIONS
Our plan for today is:    - Lab tests:  Inflammatory markers (CRP, erythrocyte sedimentation rate) to be checked last week for each month  Labs (CBC,AST,Creatinine, CRP and erythrocyte sedimentation rate) 1 week before each visit     - Medications:  Prednisone tapering schedule:   Take prednisone 10 mg daily for a total of 2 weeks then prednisone 9 mg daily for 1 week then  prednisone 8 mg daily for 1 week then  prednisone 7 mg daily for 1 week then  prednisone 6 mg daily for 1 week then  prednisone 5 mg daily for 1 week then  prednisone 4 mg daily for 1 week then  prednisone 3 mg daily for 1 week then  prednisone 2 mg daily for 1 week then  prednisone 1 mg daily for 1 week then Stop     Continue on weekly Alendronate and vitamin D supplementation

## 2024-09-21 ENCOUNTER — MYC REFILL (OUTPATIENT)
Dept: CARDIOLOGY | Facility: CLINIC | Age: 64
End: 2024-09-21
Payer: COMMERCIAL

## 2024-09-21 DIAGNOSIS — I25.10 CORONARY ARTERY DISEASE INVOLVING NATIVE HEART WITHOUT ANGINA PECTORIS, UNSPECIFIED VESSEL OR LESION TYPE: ICD-10-CM

## 2024-09-21 DIAGNOSIS — E78.2 MIXED HYPERLIPIDEMIA: ICD-10-CM

## 2024-09-23 RX ORDER — EZETIMIBE 10 MG/1
10 TABLET ORAL DAILY
Qty: 90 TABLET | Refills: 3 | OUTPATIENT
Start: 2024-09-23

## 2024-09-23 RX ORDER — EZETIMIBE 10 MG/1
10 TABLET ORAL DAILY
Qty: 90 TABLET | Refills: 3 | Status: SHIPPED | OUTPATIENT
Start: 2024-09-23

## 2024-09-24 ENCOUNTER — LAB (OUTPATIENT)
Dept: LAB | Facility: CLINIC | Age: 64
End: 2024-09-24
Payer: COMMERCIAL

## 2024-09-24 DIAGNOSIS — M35.3 PMR (POLYMYALGIA RHEUMATICA) (H): ICD-10-CM

## 2024-09-24 LAB
CRP SERPL-MCNC: 3.55 MG/L
ERYTHROCYTE [SEDIMENTATION RATE] IN BLOOD BY WESTERGREN METHOD: 9 MM/HR (ref 0–20)

## 2024-09-24 PROCEDURE — 85652 RBC SED RATE AUTOMATED: CPT

## 2024-09-24 PROCEDURE — 36415 COLL VENOUS BLD VENIPUNCTURE: CPT

## 2024-09-24 PROCEDURE — 86140 C-REACTIVE PROTEIN: CPT

## 2024-09-28 ENCOUNTER — MYC MEDICAL ADVICE (OUTPATIENT)
Dept: RHEUMATOLOGY | Facility: CLINIC | Age: 64
End: 2024-09-28
Payer: COMMERCIAL

## 2024-09-28 DIAGNOSIS — M35.3 PMR (POLYMYALGIA RHEUMATICA) (H): ICD-10-CM

## 2024-09-30 ENCOUNTER — MYC MEDICAL ADVICE (OUTPATIENT)
Dept: RHEUMATOLOGY | Facility: CLINIC | Age: 64
End: 2024-09-30
Payer: COMMERCIAL

## 2024-09-30 NOTE — TELEPHONE ENCOUNTER
"Called pt to discuss recommendations from Dr Woodson;   \"- Increase prednisone to prednisone 10 mg daily for the next 4 weeks   - then repeat labs in 4 weeks   - Afterwards, we will assess then if slower taper for prednisone can be pursued or addition of Kevzara as steroid sparing agent.\"    Pt verbalized understanding. Pt requested more prednisone. Order pended and sent to provider to review and sign.    Last Written Prescription Date:  9/4/24  Last Fill Quantity: 30,  # refills: 1   Last office visit: 9/4/2024   Future Office Visit:  12/4/24    Peyton Garcia RN            "

## 2024-09-30 NOTE — TELEPHONE ENCOUNTER
Patient calling to follow-up with a nurse regarding his current symptoms. Please call back to discuss further. Thank you.

## 2024-09-30 NOTE — TELEPHONE ENCOUNTER
Returned call to patient. Patient reports that appx 2 weeks ago upon decreasing prednisone taper. Patient reports was taking 8 mgh prednisone at the time. Patient reports the pain started in Left thumb, hand and bilateral shoulders. It was mild but worsened upon decrease to 7 mg the following week. Patient reports pain continued worsening and when he decreased to 6 mg on Friday, pain also started in right hand. Patient reports over the weekend, pain became so severe that he took an additional 10 mg prednisone on Sunday. Patient reports this helped but as of today, pain is coming back. Message routed to provider to advise.    Nathaly Love RN

## 2024-10-01 RX ORDER — PREDNISONE 5 MG/1
10 TABLET ORAL DAILY
Qty: 60 TABLET | Refills: 0 | Status: SHIPPED | OUTPATIENT
Start: 2024-10-01

## 2024-10-03 ENCOUNTER — TELEPHONE (OUTPATIENT)
Dept: RHEUMATOLOGY | Facility: CLINIC | Age: 64
End: 2024-10-03
Payer: COMMERCIAL

## 2024-10-03 DIAGNOSIS — M35.3 PMR (POLYMYALGIA RHEUMATICA) (H): ICD-10-CM

## 2024-10-03 DIAGNOSIS — M35.3 PMR (POLYMYALGIA RHEUMATICA) (H): Primary | ICD-10-CM

## 2024-10-03 RX ORDER — PREDNISONE 5 MG/1
TABLET ORAL
Qty: 30 TABLET | Refills: 2 | Status: SHIPPED | OUTPATIENT
Start: 2024-10-03 | End: 2024-10-03

## 2024-10-03 RX ORDER — PREDNISONE 5 MG/1
TABLET ORAL
Qty: 168 TABLET | Refills: 0 | Status: SHIPPED | OUTPATIENT
Start: 2024-10-03 | End: 2024-11-01

## 2024-10-03 NOTE — TELEPHONE ENCOUNTER
[facility] Call [facility]    Phone Message    May a detailed message be left on voicemail: yes     Reason for Call: Medication Question or concern regarding medication   Prescription Clarification  Name of Medication:   predniSONE (DELTASONE) 5 MG tablet       Prescribing Provider:   Valdez Woodson MD        Pharmacy:   [id number] [address] AT [place] [address]      What on the order needs clarification?     The pharmacy was calling to get clarification on the directions please review and follow up thank you.      Action Taken: Message routed to:  Other:   [facility]          Travel Screening: Not Applicable     Date of Service:

## 2024-10-03 NOTE — TELEPHONE ENCOUNTER
Pt scheduled for 4 wk follow-up on 11/1 and will complete labs on a walk-in basis at [facility]

## 2024-10-04 ENCOUNTER — LAB (OUTPATIENT)
Dept: LAB | Facility: CLINIC | Age: 64
End: 2024-10-04
Payer: COMMERCIAL

## 2024-10-04 DIAGNOSIS — M35.3 PMR (POLYMYALGIA RHEUMATICA) (H): ICD-10-CM

## 2024-10-04 LAB
AST SERPL W P-5'-P-CCNC: 33 U/L (ref 0–45)
BASOPHILS # BLD AUTO: 0 10E3/UL (ref 0–0.2)
BASOPHILS NFR BLD AUTO: 0 %
CREAT SERPL-MCNC: 1.15 MG/DL (ref 0.67–1.17)
CRP SERPL-MCNC: 3.5 MG/L
EGFRCR SERPLBLD CKD-EPI 2021: 71 ML/MIN/1.73M2
EOSINOPHIL # BLD AUTO: 0.1 10E3/UL (ref 0–0.7)
EOSINOPHIL NFR BLD AUTO: 1 %
ERYTHROCYTE [DISTWIDTH] IN BLOOD BY AUTOMATED COUNT: 13.8 % (ref 10–15)
ERYTHROCYTE [SEDIMENTATION RATE] IN BLOOD BY WESTERGREN METHOD: 16 MM/HR (ref 0–20)
HCT VFR BLD AUTO: 36.7 % (ref 40–53)
HGB BLD-MCNC: 12.1 G/DL (ref 13.3–17.7)
IMM GRANULOCYTES # BLD: 0.1 10E3/UL
IMM GRANULOCYTES NFR BLD: 1 %
LYMPHOCYTES # BLD AUTO: 1.9 10E3/UL (ref 0.8–5.3)
LYMPHOCYTES NFR BLD AUTO: 24 %
MCH RBC QN AUTO: 30.7 PG (ref 26.5–33)
MCHC RBC AUTO-ENTMCNC: 33 G/DL (ref 31.5–36.5)
MCV RBC AUTO: 93 FL (ref 78–100)
MONOCYTES # BLD AUTO: 1 10E3/UL (ref 0–1.3)
MONOCYTES NFR BLD AUTO: 13 %
NEUTROPHILS # BLD AUTO: 4.9 10E3/UL (ref 1.6–8.3)
NEUTROPHILS NFR BLD AUTO: 61 %
NRBC # BLD AUTO: 0 10E3/UL
NRBC BLD AUTO-RTO: 0 /100
PLATELET # BLD AUTO: 263 10E3/UL (ref 150–450)
RBC # BLD AUTO: 3.94 10E6/UL (ref 4.4–5.9)
WBC # BLD AUTO: 8 10E3/UL (ref 4–11)

## 2024-10-04 PROCEDURE — 86140 C-REACTIVE PROTEIN: CPT

## 2024-10-04 PROCEDURE — 84450 TRANSFERASE (AST) (SGOT): CPT

## 2024-10-04 PROCEDURE — 36415 COLL VENOUS BLD VENIPUNCTURE: CPT

## 2024-10-04 PROCEDURE — 82565 ASSAY OF CREATININE: CPT

## 2024-10-04 PROCEDURE — 85652 RBC SED RATE AUTOMATED: CPT

## 2024-10-04 PROCEDURE — 85004 AUTOMATED DIFF WBC COUNT: CPT

## 2024-10-04 PROCEDURE — 85018 HEMOGLOBIN: CPT

## 2024-10-23 ENCOUNTER — LAB (OUTPATIENT)
Dept: LAB | Facility: CLINIC | Age: 64
End: 2024-10-23
Payer: COMMERCIAL

## 2024-10-23 ENCOUNTER — ANCILLARY PROCEDURE (OUTPATIENT)
Dept: GENERAL RADIOLOGY | Facility: CLINIC | Age: 64
End: 2024-10-23
Attending: STUDENT IN AN ORGANIZED HEALTH CARE EDUCATION/TRAINING PROGRAM
Payer: COMMERCIAL

## 2024-10-23 DIAGNOSIS — M13.0 POLYARTHRITIS: ICD-10-CM

## 2024-10-23 LAB — RHEUMATOID FACT SERPL-ACNC: <10 IU/ML

## 2024-10-23 PROCEDURE — 86431 RHEUMATOID FACTOR QUANT: CPT

## 2024-10-23 PROCEDURE — 81374 HLA I TYPING 1 ANTIGEN LR: CPT

## 2024-10-23 PROCEDURE — 73130 X-RAY EXAM OF HAND: CPT | Mod: TC | Performed by: RADIOLOGY

## 2024-10-23 PROCEDURE — 73630 X-RAY EXAM OF FOOT: CPT | Mod: TC | Performed by: RADIOLOGY

## 2024-10-23 PROCEDURE — 86200 CCP ANTIBODY: CPT

## 2024-10-23 PROCEDURE — 36415 COLL VENOUS BLD VENIPUNCTURE: CPT

## 2024-10-24 LAB — CCP AB SER IA-ACNC: 0.9 U/ML

## 2024-10-28 LAB
B LOCUS: NORMAL
B27TEST METHOD: NORMAL

## 2024-11-01 ENCOUNTER — OFFICE VISIT (OUTPATIENT)
Dept: RHEUMATOLOGY | Facility: CLINIC | Age: 64
End: 2024-11-01
Attending: STUDENT IN AN ORGANIZED HEALTH CARE EDUCATION/TRAINING PROGRAM
Payer: COMMERCIAL

## 2024-11-01 VITALS
WEIGHT: 178 LBS | HEART RATE: 54 BPM | SYSTOLIC BLOOD PRESSURE: 109 MMHG | DIASTOLIC BLOOD PRESSURE: 64 MMHG | OXYGEN SATURATION: 99 % | BODY MASS INDEX: 25.54 KG/M2

## 2024-11-01 DIAGNOSIS — M35.3 PMR (POLYMYALGIA RHEUMATICA) (H): Primary | ICD-10-CM

## 2024-11-01 DIAGNOSIS — M85.859 OSTEOPENIA OF HIP, UNSPECIFIED LATERALITY: ICD-10-CM

## 2024-11-01 PROCEDURE — G2211 COMPLEX E/M VISIT ADD ON: HCPCS | Performed by: STUDENT IN AN ORGANIZED HEALTH CARE EDUCATION/TRAINING PROGRAM

## 2024-11-01 PROCEDURE — 99215 OFFICE O/P EST HI 40 MIN: CPT | Performed by: STUDENT IN AN ORGANIZED HEALTH CARE EDUCATION/TRAINING PROGRAM

## 2024-11-01 PROCEDURE — 99213 OFFICE O/P EST LOW 20 MIN: CPT | Performed by: STUDENT IN AN ORGANIZED HEALTH CARE EDUCATION/TRAINING PROGRAM

## 2024-11-01 RX ORDER — PREDNISONE 5 MG/1
TABLET ORAL
Qty: 154 TABLET | Refills: 0 | Status: SHIPPED | OUTPATIENT
Start: 2024-11-01 | End: 2024-12-27

## 2024-11-01 ASSESSMENT — ENCOUNTER SYMPTOMS
DOUBLE VISION: 0
BACK PAIN: 0
WEIGHT LOSS: 0
FEVER: 0
NECK PAIN: 0
COUGH: 0
SHORTNESS OF BREATH: 0
MYALGIAS: 0
BLURRED VISION: 0
HEADACHES: 0
PHOTOPHOBIA: 0
CLAUDICATION: 0

## 2024-11-01 ASSESSMENT — PAIN SCALES - GENERAL: PAINLEVEL_OUTOF10: MODERATE PAIN (4)

## 2024-11-01 NOTE — PROGRESS NOTES
RHEUMATOLOGY OUTPATIENT CLINIC NOTE     Referring Provider: Valdez Woodson MD    Lab review     RF:  Negative    CCP Ab: Negative      HLA B27: Negative         Imaging review     X-ray both hands 10/2024  Normal joint spaces and alignment. No erosive change. No fracture    X-ray both feet 10/2024  Normal joint spaces and alignment. No erosive change. No fracture. Bilateral type II accessory naviculars. Small plantar and Achilles heel spurs bilaterally.    X-ray hip bilateral, 3/2023: Degenerative changes    Subjective     Visit date September 4, 2024    Minor is a 64 year old male who presents today for follow up.    Since the last visit,    He had flare of PMR in the shoulders, bilateral hand and wrist while on prednisone 6 mg daily so prednisone was increased to 10 mg daily then increased to prednisone 25 mg daily to help with symptoms with improvement however not as previously experienced     - Workup on 10/23/2024 showed:   Hemoglobin overall stable  WBC  within normal limits   Platelets  within normal limits   ESR  within normal limits   CRP  within normal limits   AST  within normal limits   Creatinine  within normal limits     X-ray both hands 10/2024  Normal joint spaces and alignment. No erosive change. No fracture    X-ray both feet 10/2024  Normal joint spaces and alignment. No erosive change. No fracture. Bilateral type II accessory naviculars. Small plantar and Achilles heel spurs bilaterally.    Today, Minor mentioned the following:    He remains with pain and swelling in the hands (involving wrists, MCPs, PIPs without DIP involvement)   He has morning stiffness for 1-2 hours   He does not feel prednisone had the same benefit as when used at the first time    He now has minimal shoulder or hip stiffness     Review of Systems   Constitutional:  Negative for fever and weight loss.   Eyes:  Negative for blurred vision, double vision and photophobia.   Respiratory:  Negative for cough and shortness of  breath.    Cardiovascular:  Negative for claudication.   Musculoskeletal:  Negative for back pain, myalgias and neck pain.   Neurological:  Negative for headaches.        Negative for Scalp tenderness, Jaw claudications       Current Medications   Prednisone 20 mg for 14 days   Alendronate once weekly   Calcium and vitamin D supplementation    Past Medical history   Past Medical History:   Diagnosis Date    Coronary artery disease     High cholesterol     Hypertension     Myocardial infarction (H) 2005       Objective   /64 (BP Location: Right arm, Patient Position: Sitting, Cuff Size: Adult Regular)   Pulse 54   Wt 80.7 kg (178 lb)   SpO2 99%   BMI 25.54 kg/m        PHYSICAL EXAMINATION  Physical Exam  HENT:      Mouth/Throat:      Mouth: Mucous membranes are moist.   Eyes:      Conjunctiva/sclera: Conjunctivae normal.   Pulmonary:      Effort: Pulmonary effort is normal.      Breath sounds: Normal breath sounds.   Musculoskeletal:         General: Swelling and tenderness present.      Comments: Swelling and tenderness in right wrist   Swelling without tenderness in few PIPs   Tenderness in bilateral CMC joints  Range of motion in shoulders and hips are good  Right knee swelling and tenderness   No swelling or tenderness in elbows,feet    Skin:     Findings: No rash.   Neurological:      Mental Status: He is alert.         Assessment & Plan     # Polymyalgia Rheumatica   # Inflammatory arthritis   # Degenerative arthritis in the knees  # Multiple comorbidities [coronary artery disease, hypertension, hyperlipidemia]  # Prednisone use, chronic   # Screening for chronic infections   # Longitudinal care    Minor is following with rheumatology for polymyalgia rheumatica.  Onset: 2024  Involvement:  Bilateral shoulder and hip stiffness, elevated inflammatory markers  No symptoms suggestive of GCA, no peripheral inflammatory arthropathy.  Comorbid conditions: Coronary artery disease, hypertension,  hypercholesterolemia.  Treatment included: Prednisone    # Polymyalgia Rheumatica   # Inflammatory arthritis     He had flare from polymyalgia rheumatica when prednisone was lowered at 6 mg and he remained with symptoms despite increasing prednisone requiring prednisone 25 mg to keep his symptoms under better control.  He is also experiencing inflammatory arthritis involving wrists, MCPs and PIPs along with some shoulder and hip stiffness.    It is possible that he had inflammatory arthritis and initially presented with PMR.    We discussed additional therapy to prednisone to better control his underlying polymyalgia rheumatica and inflammatory arthritis, options would be addition of methotrexate or sarilumab.  Sarilumab is FDA approved treatment for relapsing polymyalgia rheumatica and methotrexate has been used for both inflammatory arthritis as well as polymyalgia rheumatica.    Risks and side effects of both were discussed at length as below, he is undecided about which options.    I will send him information about both medications and we will set up an [facility] pharmacy visit to discuss both medications for counseling and education then prior authorization of medication of his choosing.    In the meantime, we will continue prednisone to keep his inflammatory symptoms under control.    Plan:  - Taper prednisone as below   - [facility] follow up for either Sarliumab or methotrexate initiation     Take prednisone 17.5 mg daily for a total of 2 weeks then  prednisone 15 mg daily for 2 weeks then    prednisone 12.5 mg daily for 2 week then    prednisone 10 mg daily for 2 week then      # High risk medication requiring lab tests for toxicity monitoring     The adverse reactions of MTX was discussed which includes cytopenia, immunosuppression, infection, pneumonitis, dizziness, nausea, stomach upset, risk of falls, and derangements in LFTs, risk of malignancy. Recommended against use of alcohol while on MTX.   Patient  counseled about the risk of Alopecia, infection, liver dysfunction and myelosuppression, including the importance of taking daily Folic acid.     I have discussed with potential adverse effects with use of Sarliumab such as suppression of immune system making pt more prone to infections, to hold the medication if the patient has any signs of any infection and to restart only after infection has cleared, reactivation of infections particularly TB and hepatitis B, C, fungal infections, risk of diverticulitis, risk of malignancy.    # Prednisone use, chronic   # Screening for glucocorticoid induced osteoporosis   Vitamin D, 47 (2024)  DEXA scan: Osteopenia, major fracture risk 11.6%], hip fracture risk 2.6%.     Plan:  - Continue alendronate 70 mg weeks  - Continue vitamin D supplementation    # Screening for chronic infections  2024  Hepatitis B surface antigen: Negative  Hepatitis B core: Negative  Hepatitis B surface antibody: Negative   Hepatitis C antibody: Negative   QuantiFERON gold: Negative     # Longitudinal care  The longitudinal plan of care for the diagnosis(es)/condition(s) Polymyalgia  as documented were addressed during this visit. Due to the added complexity in care, I will continue to support Minor in the subsequent management and with ongoing continuity of care.              44 MINUTES SPENT BY ME on the date of service doing chart review, history, exam, documentation & further activities per the note.      Return in about 3 months (around 2/1/2025) for Follow up.    Valdez Woodson MD  [facility] RHEUMATOLOGY

## 2024-11-01 NOTE — NURSING NOTE
Chief Complaint   Patient presents with    RECHECK     *4 week f/u per Chintan      /64 (BP Location: Right arm, Patient Position: Sitting, Cuff Size: Adult Regular)   Pulse 54   Wt 80.7 kg (178 lb)   SpO2 99%   BMI 25.54 kg/m    Renetta Dalton [facility]

## 2024-11-01 NOTE — LETTER
11/1/2024       RE: Minor Murcia  [address] [place] [id number]     Dear Colleague,    Thank you for referring your patient, Minor Murcia, to the [facility] RHEUMATOLOGY at [facility]. Please see a copy of my visit note below.    RHEUMATOLOGY OUTPATIENT CLINIC NOTE     Referring Provider: Valdez Woodson MD    Lab review     RF:  Negative    CCP Ab: Negative      HLA B27: Negative         Imaging review     X-ray both hands 10/2024  Normal joint spaces and alignment. No erosive change. No fracture    X-ray both feet 10/2024  Normal joint spaces and alignment. No erosive change. No fracture. Bilateral type II accessory naviculars. Small plantar and Achilles heel spurs bilaterally.    X-ray hip bilateral, 3/2023: Degenerative changes    Subjective    Visit date September 4, 2024    Minor is a 64 year old male who presents today for follow up.    Since the last visit,    He had flare of PMR in the shoulders, bilateral hand and wrist while on prednisone 6 mg daily so prednisone was increased to 10 mg daily then increased to prednisone 25 mg daily to help with symptoms with improvement however not as previously experienced     - Workup on 10/23/2024 showed:   Hemoglobin overall stable  WBC  within normal limits   Platelets  within normal limits   ESR  within normal limits   CRP  within normal limits   AST  within normal limits   Creatinine  within normal limits     X-ray both hands 10/2024  Normal joint spaces and alignment. No erosive change. No fracture    X-ray both feet 10/2024  Normal joint spaces and alignment. No erosive change. No fracture. Bilateral type II accessory naviculars. Small plantar and Achilles heel spurs bilaterally.    Today, Minor mentioned the following:    He remains with pain and swelling in the hands (involving wrists, MCPs, PIPs without DIP involvement)   He has morning stiffness for 1-2 hours   He does not feel prednisone had the  same benefit as when used at the first time    He now has minimal shoulder or hip stiffness     Review of Systems   Constitutional:  Negative for fever and weight loss.   Eyes:  Negative for blurred vision, double vision and photophobia.   Respiratory:  Negative for cough and shortness of breath.    Cardiovascular:  Negative for claudication.   Musculoskeletal:  Negative for back pain, myalgias and neck pain.   Neurological:  Negative for headaches.        Negative for Scalp tenderness, Jaw claudications       Current Medications   Prednisone 20 mg for 14 days   Alendronate once weekly   Calcium and vitamin D supplementation    Past Medical history   Past Medical History:   Diagnosis Date     Coronary artery disease      High cholesterol      Hypertension      Myocardial infarction (H) 2005       Objective  /64 (BP Location: Right arm, Patient Position: Sitting, Cuff Size: Adult Regular)   Pulse 54   Wt 80.7 kg (178 lb)   SpO2 99%   BMI 25.54 kg/m        PHYSICAL EXAMINATION  Physical Exam  HENT:      Mouth/Throat:      Mouth: Mucous membranes are moist.   Eyes:      Conjunctiva/sclera: Conjunctivae normal.   Pulmonary:      Effort: Pulmonary effort is normal.      Breath sounds: Normal breath sounds.   Musculoskeletal:         General: Swelling and tenderness present.      Comments: Swelling and tenderness in right wrist   Swelling without tenderness in few PIPs   Tenderness in bilateral CMC joints  Range of motion in shoulders and hips are good  Right knee swelling and tenderness   No swelling or tenderness in elbows,feet    Skin:     Findings: No rash.   Neurological:      Mental Status: He is alert.         Assessment & Plan    # Polymyalgia Rheumatica   # Inflammatory arthritis   # Degenerative arthritis in the knees  # Multiple comorbidities [coronary artery disease, hypertension, hyperlipidemia]  # Prednisone use, chronic   # Screening for chronic infections   # Longitudinal care    Minor nix  following with rheumatology for polymyalgia rheumatica.  Onset: 2024  Involvement:  Bilateral shoulder and hip stiffness, elevated inflammatory markers  No symptoms suggestive of GCA, no peripheral inflammatory arthropathy.  Comorbid conditions: Coronary artery disease, hypertension, hypercholesterolemia.  Treatment included: Prednisone    # Polymyalgia Rheumatica   # Inflammatory arthritis     He had flare from polymyalgia rheumatica when prednisone was lowered at 6 mg and he remained with symptoms despite increasing prednisone requiring prednisone 25 mg to keep his symptoms under better control.  He is also experiencing inflammatory arthritis involving wrists, MCPs and PIPs along with some shoulder and hip stiffness.    It is possible that he had inflammatory arthritis and initially presented with PMR.    We discussed additional therapy to prednisone to better control his underlying polymyalgia rheumatica and inflammatory arthritis, options would be addition of methotrexate or sarilumab.  Sarilumab is FDA approved treatment for relapsing polymyalgia rheumatica and methotrexate has been used for both inflammatory arthritis as well as polymyalgia rheumatica.    Risks and side effects of both were discussed at length as below, he is undecided about which options.    I will send him information about both medications and we will set up an [facility] pharmacy visit to discuss both medications for counseling and education then prior authorization of medication of his choosing.    In the meantime, we will continue prednisone to keep his inflammatory symptoms under control.    Plan:  - Taper prednisone as below   - [facility] follow up for either Sarliumab or methotrexate initiation     Take prednisone 17.5 mg daily for a total of 2 weeks then  prednisone 15 mg daily for 2 weeks then    prednisone 12.5 mg daily for 2 week then    prednisone 10 mg daily for 2 week then      # High risk medication requiring lab tests for  toxicity monitoring     The adverse reactions of MTX was discussed which includes cytopenia, immunosuppression, infection, pneumonitis, dizziness, nausea, stomach upset, risk of falls, and derangements in LFTs, risk of malignancy. Recommended against use of alcohol while on MTX.   Patient counseled about the risk of Alopecia, infection, liver dysfunction and myelosuppression, including the importance of taking daily Folic acid.     I have discussed with potential adverse effects with use of Sarliumab such as suppression of immune system making pt more prone to infections, to hold the medication if the patient has any signs of any infection and to restart only after infection has cleared, reactivation of infections particularly TB and hepatitis B, C, fungal infections, risk of diverticulitis, risk of malignancy.    # Prednisone use, chronic   # Screening for glucocorticoid induced osteoporosis   Vitamin D, 47 (2024)  DEXA scan: Osteopenia, major fracture risk 11.6%], hip fracture risk 2.6%.     Plan:  - Continue alendronate 70 mg weeks  - Continue vitamin D supplementation    # Screening for chronic infections  2024  Hepatitis B surface antigen: Negative  Hepatitis B core: Negative  Hepatitis B surface antibody: Negative   Hepatitis C antibody: Negative   QuantiFERON gold: Negative     # Longitudinal care  The longitudinal plan of care for the diagnosis(es)/condition(s) Polymyalgia  as documented were addressed during this visit. Due to the added complexity in care, I will continue to support Minor in the subsequent management and with ongoing continuity of care.              44 MINUTES SPENT BY ME on the date of service doing chart review, history, exam, documentation & further activities per the note.      Return in about 3 months (around 2/1/2025) for Follow up.    Valdez Woodson MD  [facility] RHEUMATOLOGY      Again, thank you for allowing me to participate in the care of your patient.       Sincerely,    Valdez Woodson MD

## 2024-11-01 NOTE — Clinical Note
Minor Ocampo has flare of PMR and possible inflammatory arthritis. I offered him Sarilumab and methotrexate. He is undecided between both but seems leaning more towards Sarilumab. Can you arrange for a visit to discuss both with him and proceed with whichever he decide.  I am leaning slightly more towards Sarilumab. Thank you

## 2024-11-06 ENCOUNTER — VIRTUAL VISIT (OUTPATIENT)
Dept: PHARMACY | Facility: CLINIC | Age: 64
End: 2024-11-06
Attending: STUDENT IN AN ORGANIZED HEALTH CARE EDUCATION/TRAINING PROGRAM
Payer: COMMERCIAL

## 2024-11-06 DIAGNOSIS — M85.80 OSTEOPENIA: ICD-10-CM

## 2024-11-06 DIAGNOSIS — M19.90 INFLAMMATORY ARTHRITIS: ICD-10-CM

## 2024-11-06 DIAGNOSIS — M35.3 PMR (POLYMYALGIA RHEUMATICA) (H): Primary | ICD-10-CM

## 2024-11-06 DIAGNOSIS — E78.5 HYPERLIPIDEMIA, UNSPECIFIED HYPERLIPIDEMIA TYPE: ICD-10-CM

## 2024-11-06 DIAGNOSIS — Z71.85 VACCINE COUNSELING: ICD-10-CM

## 2024-11-06 DIAGNOSIS — I25.10 CARDIOVASCULAR DISEASE: ICD-10-CM

## 2024-11-06 DIAGNOSIS — I10 HTN (HYPERTENSION): ICD-10-CM

## 2024-11-06 RX ORDER — CALCIUM CARBONATE/VITAMIN D3 600 MG-10
1 TABLET ORAL DAILY
COMMUNITY

## 2024-11-06 NOTE — Clinical Note
Good morning, patient wanting to start on sarilumab. Please let me know if you would like to personally order the prescription or if you would prefer I order it. Thank you!

## 2024-11-06 NOTE — PATIENT INSTRUCTIONS
"Recommendations from today's MTM visit:                                                      [facility] pharmacist to update Dr. Woodson on decision to start sarilumab. Will get prescription ordered to [facility].  [facility] pharmacist to update patient as we hear about approval of sarilumab.  Patient to reach out to [facility] pharmacist if he would like more teaching on the injection once it arrives.  Consider the following vaccine: RSV    Follow-up: with Dr. Woodson 12/4/2024, with me via "[employer] and for follow-up in about 3 months    It was great speaking with you today.  I value your experience and would be very thankful for your time in providing feedback in our clinic survey. In the next few days, you may receive an email or text message from [employer] with a link to a survey related to your  clinical pharmacist.\"     To schedule another MTM appointment, please call the clinic directly or you may call the MTM scheduling line at [phone].    My Clinical Pharmacist's contact information:                                                      Please feel free to contact me with any questions or concerns you have.      Eliz Armendariz, PharmD  Medication Therapy Management Pharmacist  [facility] Rheumatology - Dermatology  Phone: [phone]   "

## 2024-11-06 NOTE — PROGRESS NOTES
Medication Therapy Management (MTM) Encounter    ASSESSMENT:                            Medication Adherence/Access: No issues identified.    PMR (polymyalgia rheumatica)/Inflammatory Arthritis      Needs improvement. Minor is continuing to have some pain and stiffness in his hands and knees despite therapy with prednisone and would benefit from starting therapy with methotrexate or sarilumab. Minor would prefer therapy with sarilumab. Provided education on sarilumab today including dosing, general administration, side effects (both common/serious), precautions, monitoring and time to efficacy. Discussed data on malignancy and risk of serious infection in depth. Discussed potential need to hold therapy in the setting of signs/symptoms of active infection. Discussed minor drug interaction with atorvastatin, should continue to take this medication while on sarilumab. We will check cholesterol levels while on therapy. Discussed the insurance and specialty pharmacy processes. Encouraged him to contact the rheumatology clinic in the event he has questions on this. Minor displayed good understanding of the plan and was agreeable to going forward with sarilumab.     Vaccines  Encouraged indicated non-live vaccines and avoidance of live vaccines. Patient is up-to-date on: PCV-20, TDaP, Shingrix, influenza, Covid-19. Per ACIP guidelines, patient is eligible for RSV. Informed patient of recommended vaccinations given age and comorbid conditions.     Hypertension  Stable, continue current regimen.     Hyperlipidemia /Cardiovascular  Stable, continue current regimen.     Bone Health   Stable, continue current regimen. If you are not eating dairy products you also need 400 IU of vitamin D per day which can be obtained in either a multivitamin or in some of the Calcium tablets.     PLAN:                            [facility] pharmacist to update Dr. Woodson on decision to start sarilumab. Will get prescription ordered to [facility].  [facility] pharmacist to update patient as we hear about approval of sarilumab.  Patient to reach out to [facility] pharmacist if he would like more teaching on the injection once it arrives.  Consider the following vaccine: RSV    Follow-up: with Dr. Woodson 12/4/2024, with me via [employer] and for follow-up in about 3 months    SUBJECTIVE/OBJECTIVE:                          Minor Murcia is a 64 year old male seen for an initial visit. He was referred to me from Valdez Silveira MD.     Reason for visit: Sarilumab vs methotrexate.    Allergies/ADRs: Reviewed in chart  Past Medical History: Reviewed in chart  Tobacco: He reports that he has never smoked. He has never used smokeless tobacco.  Alcohol: Reviewed in chart    Medication Adherence/Access: no issues reported.    PMR (polymyalgia rheumatica)/Inflammatory Arthritis    - Prednisone 20 mg daily for a total of 2 weeks then (current dose)  - Prednisone 17.5 mg daily for a total of 2 weeks then  - prednisone 15 mg daily for 2 weeks then    - prednisone 12.5 mg daily for 2 weeks then    - prednisone 10 mg daily for 2 weeks    Side effects: none reported.    Patient reports still having pain in his hands. Is still able to use them without issue. He is having small flares about every third day, rates pain about 3-4/10 but the issue is mostly related to stiffness. This is drastically better than in the past where his pain made it feel like his wrists were broken. He additionally has arthritis in both knees and hips. Some pain in his knees again now that he is on a lower dose of the prednisone. Hip pain is intermittent. Some shoulder stiffness but this is very rare.    Specialist: Valdez Silveira MD, Rheumatology. Last visit on 11/1/2024.     Pre-Biologic Screening:   Hep B Surface Antibody Non-reactive (6/6/2024)    Hep B Core Antibody  Non-reactive (6/6/2024)    Hep B Surface Antigen Non-reactive (6/6/2024)    Hep C Antibody  Non-reactive  (6/6/2024)    HIV Antigen Antibody Non-reactive (6/6/2024)    Quantiferon TB Gold Negative (6/6/2024)      Labs last checked 10/4/2024    Vaccines  Patient is up-to-date on: PCV-20, TDaP, Shingrix, influenza, Covid-19  Eligible for: RSV    Immunization History   Covid-19 vaccine (8650-2536 version)  Up-to-date   Influenza (annual) Up-to-date, avoid live FluMist   Pneumococcal  Prevnar-20: 9/24/2024 Complete   Tetanus/Tdap  Up-to-date   Shingrix Complete   RSV (only for >= 60 years old)  Eligible to receive   All patients on biologics should avoid live vaccines (varicella/VZV, intranasal influenza, MMR, or yellow fever vaccine (if traveling))       Hypertension   Amlodipine 5 mg once daily  Metoprolol succinate ER 12.5 mg once daily  Lisinopril 20 mg once daily  Patient reports no current medication side effects  Patient self monitors blood pressure.  Home BP monitoring checks it twice a day. Last reading was 104/65 mmHg .      Hyperlipidemia /Cardiovascular  Atorvastatin 80 mg daily  Aspirin 81 mg once daily   Ezetimibe (Zetia) 10 mg once daily  Nitroglycerin 0.4 mg as needed   Patient reports no significant myalgias or other side effects.    Bone Health   Calcium and Vitamin D   alendronate (Fosamax) 70mg weekly   Risk factors: chronic corticosteroid use     Today's Vitals: There were no vitals taken for this visit.  ----------------    I spent 38 minutes with this patient today. All changes were made via collaborative practice agreement with Valdez Woodson A copy of the visit note was provided to the patient's provider(s).    A summary of these recommendations was sent via [employer].    Wanda WenD  Medication Therapy Management Pharmacist  [facility] Rheumatology - Dermatology  Phone: [phone]    Telemedicine Visit Details  The patient's medications can be safely assessed via a telemedicine encounter.  Type of service:  Telephone visit  Originating Location (pt. Location):  Home    Distant Location (provider location):  On-site  Start Time: 10:30 AM  End Time: 11:08 AM     Medication Therapy Recommendations  No medication therapy recommendations to display

## 2024-11-08 DIAGNOSIS — M35.3 PMR (POLYMYALGIA RHEUMATICA) (H): Primary | ICD-10-CM

## 2024-11-26 ENCOUNTER — NURSE TRIAGE (OUTPATIENT)
Dept: FAMILY MEDICINE | Facility: CLINIC | Age: 64
End: 2024-11-26
Payer: COMMERCIAL

## 2024-11-26 ENCOUNTER — MYC MEDICAL ADVICE (OUTPATIENT)
Dept: FAMILY MEDICINE | Facility: CLINIC | Age: 64
End: 2024-11-26
Payer: COMMERCIAL

## 2024-11-26 NOTE — TELEPHONE ENCOUNTER
See nurse triage from 11/26/24.    Nadia Hancock, RN, BSN  [facility]

## 2024-11-26 NOTE — TELEPHONE ENCOUNTER
"Nurse Triage SBAR    Is this a 2nd Level Triage? NO    Situation:   See M[employer] message from the patient, into the clinic, on 11/26/24, regarding a positive COVID-19 test, with current COVID-19 symptoms:    \"Tested positive for Covid yesterday, symptoms started Saturday. Should i take Paxlovid with everything I've got going on?\"    Background:   Hx of CAD, ASCVD, HLD, impaired fasting glucose, PMR (polymyalgia rheumatica), and arthralgia-patient is also taking oral prednisone    Assessment:   Patient reports testing positive for COVID -19 yesterday, Monday, 11/25/24, at home, with current COVID-19 symptoms    Current COVID-19 symptoms started on 11/21/24, Saturday    Current symptoms:  Fatigue, sinus congestion, runny nose    Reports feeling better than yesterday    Denies chest pain, trouble breathing, chills, fever, loss of smell or taste, muscle pain, headache, sore throat, dizziness, lightheadedness, earache, vomiting, diarrhea, nausea, rash, or dysuria    Patient would like to receive Paxlovid for current COVID-19 infection    Protocol Recommended Disposition:   Discuss With PCP And Callback By Nurse Within 1 Hour    Recommendation:   Gave care advice. Recommended that the patient meet with a virtual provider to discuss Paxlovid treatment options as patient is currently taking amlodipine, prednisone, and atorvastatin.    Patient verbalized understanding and agrees with the plan.    Writer assisted the patient with scheduling a virtual visit, with a provider, tomorrow, Wednesday, 11/27/24, at 9:00 am, to discuss Paxlovid treatment options.    Denies other questions or concerns at this time.    Virtual appt. 11/27/24 at 9:00 am    Does the patient meet one of the following criteria for ADS visit consideration? 16+ years old, with an [facility] PCP     TIP  Providers, please consider if this condition is appropriate for management at one of our Acute and Diagnostic Services sites.     If patient is a good candidate, " please use dotphrase <dot>triageresponse and select Refer to ADS to document.     RN COVID TREATMENT VISIT  11/26/24      The patient has been triaged and does not require a higher level of care.    Minor Murcia  64 year old  Current weight? 178#    Has the patient been seen by a primary care provider at an [facility] or [facility] within the past two years? Yes.   Have you been in close proximity to/do you have a known exposure to a person with a confirmed case of influenza? No.     General treatment eligibility:  Date of positive COVID test (PCR or at home)?  11/25/24    Are you or have you been hospitalized for this COVID-19 infection? No.   Have you received monoclonal antibodies or antiviral treatment for COVID-19 since this positive test? No.   Do you have any of the following conditions that place you at risk of being very sick from COVID-19?   - Age 50 years or older  - Heart conditions such as cardiomyopathies, congenital heart defects, coronary artery disease, heart arrhythmias, heart failure, hypertension, valve disorders   Yes, patient has at least one high risk condition as noted above.     Current COVID symptoms:   - fatigue  - congestion and runny nose  Yes. Patient has at least one symptom as selected.     How many days since symptoms started? 5 days or less. Established patient, 12 years or older weighing at least 88.2 lbs, who has symptoms that started in the past 5 days, has not been hospitalized nor received treatment already, and is at risk for being very sick from COVID-19.     Treatment eligibility by RN:  Are you currently pregnant or nursing? No  Do you have a clinically significant hypersensitivity to nirmatrelvir or ritonavir, or toxic epidermal necrolysis (TEN) or Pruett-Luke Syndrome? No  Do you have a history of hepatitis, any hepatic impairment on the Problem List (such as Child-Ness Class C, cirrhosis, fatty liver disease, alcoholic liver disease), or  was the last liver lab (hepatic panel, ALT, AST, ALK Phos, bilirubin) elevated in the past 6 months? No  Do you have any history of severe renal impairment (eGFR < 30mL/min)? No    Is patient eligible to continue? Yes, patient meets all eligibility requirements for the RN COVID treatment (as denoted by all no responses above).     Current Outpatient Medications   Medication Sig Dispense Refill    acetaminophen (TYLENOL) 160 MG TBDP Take 500 mg by mouth 2 times daily as needed for mild pain      alendronate (FOSAMAX) 70 MG tablet Take 1 tablet (70 mg) by mouth every 7 days. 12 tablet 1    amLODIPine (NORVASC) 5 MG tablet Take 1 tablet (5 mg) by mouth daily 90 tablet 1    aspirin 81 MG EC tablet [ASPIRIN 81 MG EC TABLET] Take 81 mg by mouth daily.      atorvastatin (LIPITOR) 80 MG tablet Take 1 tablet (80 mg) by mouth daily 90 tablet 3    calcium carbonate-vitamin D (CALTRATE) 600-10 MG-MCG per tablet Take 1 tablet by mouth daily.      ezetimibe (ZETIA) 10 MG tablet Take 1 tablet (10 mg) by mouth daily. 90 tablet 3    Ibuprofen (ADVIL PO) Take 600 mg by mouth 3 times daily as needed for moderate pain      lisinopril (ZESTRIL) 20 MG tablet Take 1 tablet (20 mg) by mouth daily 90 tablet 2    Magnesium 400 MG TABS Take 1 tablet by mouth daily      metoprolol succinate ER (TOPROL XL) 25 MG 24 hr tablet Take 0.5 tablets (12.5 mg) by mouth daily. 90 tablet 2    multivitamin (CENTRUM SILVER) tablet Take 1 tablet by mouth daily      nitroGLYcerin (NITROSTAT) 0.4 MG sublingual tablet For chest pain place 1 tablet under the tongue every 5 minutes for 3 doses. If symptoms persist 5 minutes after 1st dose call 911. 25 tablet 1    oxyCODONE (ROXICODONE) 5 MG tablet Take 1-2 tablets (5-10 mg) by mouth nightly as needed for severe pain (Patient not taking: Reported on 11/6/2024) 14 tablet 0    predniSONE (DELTASONE) 1 MG tablet Combine with prednisone 5 mg to reach the following: Take prednisone 10 mg daily for a total of 2 weeks  then  prednisone 9 mg daily for 1 week then  prednisone 8 mg daily for 1 week then  prednisone 7 mg daily for 1 week then  prednisone 6 mg daily for 1 week then  prednisone 5 mg daily for 1 week then  prednisone 4 mg daily for 1 week then  prednisone 3 mg daily for 1 week then  prednisone 2 mg daily for 1 week then  prednisone 1 mg daily for 1 week then Stop 150 tablet 1    predniSONE (DELTASONE) 5 MG tablet Take 3.5 tablets (17.5 mg) by mouth daily for 14 days, THEN 3 tablets (15 mg) daily for 14 days, THEN 2.5 tablets (12.5 mg) daily for 14 days, THEN 2 tablets (10 mg) daily for 14 days. 154 tablet 0    predniSONE (DELTASONE) 5 MG tablet Take 2 tablets (10 mg) by mouth daily. Take 2 tablets (10 mg) by mouth daily for 4 weeks 60 tablet 0    sarilumab (KEVZARA) 200 MG/1.14ML injection Inject 1.14 mLs (200 mg) subcutaneously every 14 days. 2.28 mL 2    sildenafil (VIAGRA) 50 MG tablet Take 1 tablet (50 mg) by mouth daily as needed (ED) 30 tablet 1       Medications from List 1 of the standing order (on medications that exclude the use of Paxlovid) that patient is taking: NONE. Is patient taking Bromide's Wort? No  Is patient taking Bromide's Wort or any meds from List 1? No.   Medications from List 2 of the standing order (on meds that provider needs to adjust) that patient is taking: amlodipine (Norvasc), explained a provider visit is necessary to discuss medication adjustments while taking Paxlovid. Is patient on any of the meds from List 2? Yes. Patient will be scheduled or transferred to a  at the end of this call.     Lab Results   Component Value Date    ALT 22 05/24/2024      AST   Date Value Ref Range Status   10/04/2024 33 0 - 45 U/L Final      Lab Results   Component Value Date    ALKPHOS 66 05/24/2024      Recent Labs   Lab Test 05/24/24  0724 05/23/24  2042 05/01/24  1639 03/13/23  0910 08/02/22  1135   BILITOTAL 0.3 0.5 0.6 1.0 1.0      GFR Estimate   Date Value Ref Range Status   10/04/2024  71 >60 mL/min/1.73m2 Final     Comment:     eGFR calculated using 2021 CKD-EPI equation.   08/29/2024 76 >60 mL/min/1.73m2 Final     Comment:     eGFR calculated using 2021 CKD-EPI equation.   07/10/2024 77 >60 mL/min/1.73m2 Final     Comment:     eGFR calculated using 2021 CKD-EPI equation.   03/09/2018 >60 >60 mL/min/1.73m2 Final     Reason for Disposition   HIGH RISK patient (e.g., weak immune system, age > 64 years, obesity with BMI of 30 or higher, pregnant, chronic lung disease) and COVID symptoms (e.g., cough, fever) (Exceptions: Already seen by doctor or NP/PA and no new or worsening symptoms.)    Additional Information   Negative: SEVERE difficulty breathing (e.g., struggling for each breath, speaks in single words)   Negative: Difficult to awaken or acting confused (e.g., disoriented, slurred speech)   Negative: Bluish (or gray) lips or face now   Negative: Shock suspected (e.g., cold/pale/clammy skin, too weak to stand, low BP, rapid pulse)   Negative: Sounds like a life-threatening emergency to the triager   Negative: Diagnosed or suspected COVID-19 and symptoms lasting 3 or more weeks   Negative: COVID-19 exposure and no symptoms   Negative: COVID-19 vaccine reaction suspected (e.g., fever, headache, muscle aches) occurring 1 to 3 days after getting vaccine   Negative: COVID-19 vaccine, questions about   Negative: Exposure to someone known to have influenza (flu test positive) and flu-like symptoms (e.g., cough, runny nose, sore throat, SOB; with or without fever)   Negative: Possible COVID-19 symptoms and triager concerned about severity of symptoms or other causes   Negative: COVID-19 and breastfeeding, questions about   Negative: SEVERE or constant chest pain or pressure  (Exception: Mild central chest pain, present only when coughing.)   Negative: MODERATE difficulty breathing (e.g., speaks in phrases, SOB even at rest, pulse 100-120)   Negative: Headache and stiff neck (can't touch chin to chest)    "Negative: Oxygen level (e.g., pulse oximetry) 90% or lower   Negative: Chest pain or pressure  (Exception: MILD central chest pain, present only when coughing.)   Negative: Drinking very little and dehydration suspected (e.g., no urine > 12 hours, very dry mouth, very lightheaded)   Negative: Patient sounds very sick or weak to the triager   Negative: Fever > 103 F (39.4 C)   Negative: Fever > 101 F (38.3 C) and over 60 years of age   Negative: MILD difficulty breathing (e.g., minimal/no SOB at rest, SOB with walking, pulse <100)   Negative: Fever > 100 F (37.8 C) and bedridden (e.g., CVA, chronic illness, recovering from surgery)    Answer Assessment - Initial Assessment Questions  1. SYMPTOMS: \"What is your main symptom or concern?\" (e.g., cough, fever, shortness of breath, muscle aches)        Chest congestion and runny nose    2. ONSET: \"When did the symptoms start?\"         Saturday, 11/21/24    3. COUGH: \"Do you have a cough?\" If Yes, ask: \"How bad is the cough?\"          None noted    4. FEVER: \"Do you have a fever?\" If Yes, ask: \"What is your temperature, how was it measured, and when did it start?\"        No fever    5. BREATHING DIFFICULTY: \"Are you having any difficulty breathing?\" (e.g., normal; shortness of breath, wheezing, unable to speak)         None noted    6. BETTER-SAME-WORSE: \"Are you getting better, staying the same or getting worse compared to yesterday?\"  If getting worse, ask, \"In what way?\"        better    7. OTHER SYMPTOMS: \"Do you have any other symptoms?\"  (e.g., chills, fatigue, headache, loss of smell or taste, muscle pain, sore throat)        Denies chest pain, trouble breathing, chills, loss of smell or taste, muscle pain, headache, sore throat,, dizziness, lightheadedness, earache, vomiting, diarrhea, nausea, rash, dysuria    Fatigue, sinus congestion, runny nose    8. COVID-19 DIAGNOSIS: \"How do you know that you have COVID?\" (e.g., positive lab test or self-test, diagnosed by " "doctor or NP/PA, symptoms after exposure).        Home test positive on 11/25/24, Monday    9. COVID-19 EXPOSURE: \"Was there any known exposure to COVID before the symptoms began?\"         None noted    10. COVID-19 VACCINE: \"Have you had the COVID-19 vaccine?\" If Yes, ask: \"When did you last get it?\"          See immunizations    11. HIGH RISK DISEASE: \"Do you have any chronic medical problems?\" (e.g., asthma, heart or lung disease, weak immune system, obesity, etc.)          Hx of CAD, ASCVD, HLD, impaired fasting glucose, PMR (polymyalgia rheumatica), and arthralgia-patient is also taking oral prednisone    12. PREGNANCY: \"Is there any chance you are pregnant?\" \"When was your last menstrual period?\"        N/A       13. O2 SATURATION MONITOR:  \"Do you use an oxygen saturation monitor (pulse oximeter) at home?\" If Yes, ask \"What is your reading (oxygen level) today?\" \"What is your usual oxygen saturation reading?\" (e.g., 95%)          N/A    Protocols used: COVID-19 - Diagnosed or [id number]    Nadia Hancock RN, BSN  [facility]  "

## 2024-11-29 ENCOUNTER — LAB (OUTPATIENT)
Dept: LAB | Facility: CLINIC | Age: 64
End: 2024-11-29
Payer: COMMERCIAL

## 2024-11-29 DIAGNOSIS — M35.3 PMR (POLYMYALGIA RHEUMATICA) (H): ICD-10-CM

## 2024-11-29 LAB
AST SERPL W P-5'-P-CCNC: 26 U/L (ref 0–45)
BASOPHILS # BLD AUTO: 0 10E3/UL (ref 0–0.2)
BASOPHILS NFR BLD AUTO: 0 %
CREAT SERPL-MCNC: 1.21 MG/DL (ref 0.67–1.17)
CRP SERPL-MCNC: <3 MG/L
EGFRCR SERPLBLD CKD-EPI 2021: 66 ML/MIN/1.73M2
EOSINOPHIL # BLD AUTO: 0.2 10E3/UL (ref 0–0.7)
EOSINOPHIL NFR BLD AUTO: 2 %
ERYTHROCYTE [DISTWIDTH] IN BLOOD BY AUTOMATED COUNT: 12.8 % (ref 10–15)
ERYTHROCYTE [SEDIMENTATION RATE] IN BLOOD BY WESTERGREN METHOD: 12 MM/HR (ref 0–20)
HCT VFR BLD AUTO: 42 % (ref 40–53)
HGB BLD-MCNC: 13.8 G/DL (ref 13.3–17.7)
IMM GRANULOCYTES # BLD: 0.1 10E3/UL
IMM GRANULOCYTES NFR BLD: 1 %
LYMPHOCYTES # BLD AUTO: 1.9 10E3/UL (ref 0.8–5.3)
LYMPHOCYTES NFR BLD AUTO: 18 %
MCH RBC QN AUTO: 30.5 PG (ref 26.5–33)
MCHC RBC AUTO-ENTMCNC: 32.9 G/DL (ref 31.5–36.5)
MCV RBC AUTO: 93 FL (ref 78–100)
MONOCYTES # BLD AUTO: 0.5 10E3/UL (ref 0–1.3)
MONOCYTES NFR BLD AUTO: 5 %
NEUTROPHILS # BLD AUTO: 8.1 10E3/UL (ref 1.6–8.3)
NEUTROPHILS NFR BLD AUTO: 75 %
NRBC # BLD AUTO: 0 10E3/UL
NRBC BLD AUTO-RTO: 0 /100
PLATELET # BLD AUTO: 240 10E3/UL (ref 150–450)
RBC # BLD AUTO: 4.52 10E6/UL (ref 4.4–5.9)
WBC # BLD AUTO: 10.8 10E3/UL (ref 4–11)

## 2024-11-29 PROCEDURE — 82565 ASSAY OF CREATININE: CPT

## 2024-11-29 PROCEDURE — 85041 AUTOMATED RBC COUNT: CPT

## 2024-11-29 PROCEDURE — 85652 RBC SED RATE AUTOMATED: CPT

## 2024-11-29 PROCEDURE — 86140 C-REACTIVE PROTEIN: CPT

## 2024-11-29 PROCEDURE — 36415 COLL VENOUS BLD VENIPUNCTURE: CPT

## 2024-11-29 PROCEDURE — 84450 TRANSFERASE (AST) (SGOT): CPT

## 2024-11-29 PROCEDURE — 85025 COMPLETE CBC W/AUTO DIFF WBC: CPT

## 2024-11-30 ENCOUNTER — MYC MEDICAL ADVICE (OUTPATIENT)
Dept: RHEUMATOLOGY | Facility: CLINIC | Age: 64
End: 2024-11-30
Payer: COMMERCIAL

## 2024-12-02 NOTE — TELEPHONE ENCOUNTER
"Call to patient to provider recommendations from Dr. Woodson:     \"Seems like he tested positive for COVID few days ago.Increased joint pain could be from COVID illness. I would watch the symptoms for now and use tylenol as needed.\"    Patient verbalized understanding and has no further questions at this time.     Nathaly Love RN    "

## 2024-12-04 ENCOUNTER — OFFICE VISIT (OUTPATIENT)
Dept: RHEUMATOLOGY | Facility: CLINIC | Age: 64
End: 2024-12-04
Attending: STUDENT IN AN ORGANIZED HEALTH CARE EDUCATION/TRAINING PROGRAM
Payer: COMMERCIAL

## 2024-12-04 VITALS
HEART RATE: 58 BPM | BODY MASS INDEX: 25.68 KG/M2 | SYSTOLIC BLOOD PRESSURE: 105 MMHG | DIASTOLIC BLOOD PRESSURE: 64 MMHG | OXYGEN SATURATION: 97 % | WEIGHT: 179 LBS

## 2024-12-04 DIAGNOSIS — M31.8 SYSTEMIC VASCULITIS (H): ICD-10-CM

## 2024-12-04 DIAGNOSIS — K76.9 LIVER LESION: Primary | ICD-10-CM

## 2024-12-04 DIAGNOSIS — M35.3 PMR (POLYMYALGIA RHEUMATICA) (H): ICD-10-CM

## 2024-12-04 PROCEDURE — 99214 OFFICE O/P EST MOD 30 MIN: CPT | Performed by: STUDENT IN AN ORGANIZED HEALTH CARE EDUCATION/TRAINING PROGRAM

## 2024-12-04 PROCEDURE — 99213 OFFICE O/P EST LOW 20 MIN: CPT | Performed by: STUDENT IN AN ORGANIZED HEALTH CARE EDUCATION/TRAINING PROGRAM

## 2024-12-04 PROCEDURE — G2211 COMPLEX E/M VISIT ADD ON: HCPCS | Performed by: STUDENT IN AN ORGANIZED HEALTH CARE EDUCATION/TRAINING PROGRAM

## 2024-12-04 ASSESSMENT — ENCOUNTER SYMPTOMS
CLAUDICATION: 0
SHORTNESS OF BREATH: 0
PHOTOPHOBIA: 0
DOUBLE VISION: 0
HEADACHES: 0
BACK PAIN: 0
BLURRED VISION: 0
WEIGHT LOSS: 0
COUGH: 0
NECK PAIN: 0
FEVER: 0
MYALGIAS: 0

## 2024-12-04 ASSESSMENT — PAIN SCALES - GENERAL: PAINLEVEL_OUTOF10: MODERATE PAIN (5)

## 2024-12-04 NOTE — PROGRESS NOTES
RHEUMATOLOGY OUTPATIENT CLINIC NOTE     Referring Provider: Valdez Woodson MD    Lab review     RF:  Negative    CCP Ab: Negative    HLA B27: Negative      Imaging review     X-ray both hands 10/2024  Normal joint spaces and alignment. No erosive change. No fracture    X-ray both feet 10/2024  Normal joint spaces and alignment. No erosive change. No fracture. Bilateral type II accessory naviculars. Small plantar and Achilles heel spurs bilaterally.    X-ray hip bilateral, 3/2023: Degenerative changes    Subjective     Visit date December 4, 2024    Minor is a 64 year old male who presents today for follow up.    Since the last visit,    He continued on prednisone with slow taper however noted recurrence of symptoms when prednisone was lowered to 15 mg daily.    - Workup on 11/29/2024 showed:   Hemoglobin within normal limits   WBC  within normal limits   Platelets  within normal limits   ESR  within normal limits   CRP  within normal limits   AST  within normal limits   Creatinine  elevated    Today, Minor mentioned the following:    He has been having pain in the hands (involving wrists, MCPs, PIPs without DIP involvement)   He has morning stiffness for few hours  He has been having on and off shoulder stiffness as well  He denies new arthritis in the elbows, knees, feet.     He had COVID 12 days ago, he did not have fever, he had sick contact, chest congestion and runny nose.     Review of Systems   Constitutional:  Negative for fever and weight loss.   Eyes:  Negative for blurred vision, double vision and photophobia.   Respiratory:  Negative for cough and shortness of breath.    Cardiovascular:  Negative for claudication.   Musculoskeletal:  Negative for back pain, myalgias and neck pain.   Neurological:  Negative for headaches.        Negative for Scalp tenderness, Jaw claudications       Current Medications   Prednisone 15 mg for 7 days   Alendronate once weekly   Calcium and vitamin D  supplementation    Past Medical history   Past Medical History:   Diagnosis Date    Coronary artery disease     High cholesterol     Hypertension     Myocardial infarction (H) 2005       Objective   /64 (BP Location: Right arm, Patient Position: Sitting, Cuff Size: Adult Regular)   Pulse 58   Wt 81.2 kg (179 lb)   SpO2 97%   BMI 25.68 kg/m        PHYSICAL EXAMINATION  Physical Exam  HENT:      Head: Normocephalic.      Mouth/Throat:      Mouth: Mucous membranes are moist.   Eyes:      Conjunctiva/sclera: Conjunctivae normal.   Pulmonary:      Effort: Pulmonary effort is normal.      Breath sounds: Normal breath sounds.   Musculoskeletal:         General: No swelling or tenderness.      Comments: No noticeable swelling in the hands (MCP, PIP, DIP), wrists, elbows, knees, ankles, feet.     Range of motion in the shoulders is limited  Range of motion in the  hips are within accepted range for age.     SJC:0/28  TJC: 0/28    Pain scale is 4/10   Skin:     Findings: No rash.   Neurological:      Mental Status: He is alert.         Assessment & Plan     # Polymyalgia Rheumatica   # Inflammatory arthritis     # Degenerative arthritis in the knees  # Multiple comorbidities [coronary artery disease, hypertension, hyperlipidemia]  # Dilated ascending aorta   # lung nodule     # Prednisone use, chronic   # Screening for chronic infections   # Longitudinal care    Minor is following with rheumatology for polymyalgia rheumatica.  Onset: 2024  Involvement:  Bilateral shoulder and hip stiffness, elevated inflammatory markers No symptoms suggestive of GCA, responded well to prednisone and experienced flare with predominant peripheral arthritis.  Comorbid conditions: Coronary artery disease, hypertension, hypercholesterolemia.  Treatment included: Prednisone    # Polymyalgia Rheumatica   # Inflammatory arthritis   # Dilated ascending aorta   # lung nodule     Minor is presenting today for follow-up, he remains with symptoms  indicative of inflammatory arthritis despite prednisone, he had COVID 2 weeks ago which increased his joint pain, he remains on prednisone and is tolerating the medication thus far.  Labs overall stable as in HPI with exception of elevated creatinine.    We attempted to get sarilumab for uncontrolled PMR however he does not have insurance coverage and he does not qualify for patient assistance program.     He continues to not feel well despite prednisone, his CTA of the chest showing mild dilatation of the ascending aorta while this could be related to hypertension and atherosclerosis however given PMR and systemic symptoms therefore evaluation with PET scan would be reasonable to rule out an underlying giant cell arteritis, this would be helpful to evaluate for the extent of active PMR symptoms, inflammatory arthritis and assist in tailoring his treatment regimen.    If there is large vessel vasculitis on PET scan then we will request insurance authorization for tocilizumab.    If there is no evidence of giant cell arteritis on PET scan then we will proceed with an alternative agent, given elevated creatinine then methotrexate use and dosage can be limited therefore we would proceed with leflunomide DMARD therapy and steroid sparing agent.    In the meantime, he will continue slow taper of prednisone to avoid prednisone side effects and monitoring his symptoms.    Plan:  - Taper prednisone as below     - [facility] follow up for either tocilizumab or leflunomide initiation based on PET scan results    Prednisone tapering schedule  Take prednisone 15 mg daily for 2 weeks then    prednisone 12.5 mg daily for 2 week then    prednisone 10 mg daily till the next appointment    # High risk medication requiring lab tests for toxicity monitoring     I have discussed in detail potential adverse effects including suppression of immune system making pt prone to infections, myelosuppression, liver toxicity, avoidance of  alcohol, importance of regular follow up to monitor for medication toxicity. Patient verbalized understanding and agrees to procced.     # Prednisone use, chronic   # Screening for glucocorticoid induced osteoporosis   Vitamin D, 47 (2024)  DEXA scan: Osteopenia, major fracture risk 11.6%], hip fracture risk 2.6%.     Plan:  - Continue alendronate 70 mg weeks  - Continue vitamin D supplementation    # Screening for chronic infections  2024  Hepatitis B surface antigen: Negative  Hepatitis B core: Negative  Hepatitis B surface antibody: Negative   Hepatitis C antibody: Negative   QuantiFERON gold: Negative     # Longitudinal care  The longitudinal plan of care for the diagnosis(es)/condition(s) Polymyalgia  as documented were addressed during this visit. Due to the added complexity in care, I will continue to support Minor in the subsequent management and with ongoing continuity of care.    Review of the result(s) of each unique test - as HPI  Ordering of each unique test  Prescription drug management          Return in about 2 months (around 2/4/2025).    Valdez Woodson MD  [facility] RHEUMATOLOGY

## 2024-12-04 NOTE — Clinical Note
Sj Wellington, seems like Kevzara was not feasible. I requested PET scan and If positive then we can proceed with Actemra and if negative then we can proceed with leflunomide. His creatinine is a bit elevated and I am not sure if we would be able to get to full dose methotrexate for him. Thank you

## 2024-12-04 NOTE — PATIENT INSTRUCTIONS
Our plan for today is:    - Tests:    PET scan     - Medications:    Continue prednisone taper      If the PET scan is negative for large blood vessel inflammation then we will plan for starting leflunomide     If the PET scan showed inflammation in the large blood vessels then we will plan on starting Actemra

## 2024-12-04 NOTE — NURSING NOTE
Chief Complaint   Patient presents with    RECHECK     3 mo f/u     /64 (BP Location: Right arm, Patient Position: Sitting, Cuff Size: Adult Regular)   Pulse 58   Wt 81.2 kg (179 lb)   SpO2 97%   BMI 25.68 kg/m

## 2024-12-04 NOTE — LETTER
12/4/2024       RE: Minor Murcia  [address] [place] [id number]     Dear Colleague,    Thank you for referring your patient, Minor Murcia, to the [facility] RHEUMATOLOGY at [facility]. Please see a copy of my visit note below.    RHEUMATOLOGY OUTPATIENT CLINIC NOTE     Referring Provider: Valdez Woodson MD    Lab review     RF:  Negative    CCP Ab: Negative    HLA B27: Negative      Imaging review     X-ray both hands 10/2024  Normal joint spaces and alignment. No erosive change. No fracture    X-ray both feet 10/2024  Normal joint spaces and alignment. No erosive change. No fracture. Bilateral type II accessory naviculars. Small plantar and Achilles heel spurs bilaterally.    X-ray hip bilateral, 3/2023: Degenerative changes    Subjective    Visit date December 4, 2024    Minor is a 64 year old male who presents today for follow up.    Since the last visit,    He continued on prednisone with slow taper however noted recurrence of symptoms when prednisone was lowered to 15 mg daily.    - Workup on 11/29/2024 showed:   Hemoglobin within normal limits   WBC  within normal limits   Platelets  within normal limits   ESR  within normal limits   CRP  within normal limits   AST  within normal limits   Creatinine  elevated    Today, Minor mentioned the following:    He has been having pain in the hands (involving wrists, MCPs, PIPs without DIP involvement)   He has morning stiffness for few hours  He has been having on and off shoulder stiffness as well  He denies new arthritis in the elbows, knees, feet.     He had COVID 12 days ago, he did not have fever, he had sick contact, chest congestion and runny nose.     Review of Systems   Constitutional:  Negative for fever and weight loss.   Eyes:  Negative for blurred vision, double vision and photophobia.   Respiratory:  Negative for cough and shortness of breath.    Cardiovascular:  Negative for claudication.    Musculoskeletal:  Negative for back pain, myalgias and neck pain.   Neurological:  Negative for headaches.        Negative for Scalp tenderness, Jaw claudications       Current Medications   Prednisone 15 mg for 7 days   Alendronate once weekly   Calcium and vitamin D supplementation    Past Medical history   Past Medical History:   Diagnosis Date     Coronary artery disease      High cholesterol      Hypertension      Myocardial infarction (H) 2005       Objective  /64 (BP Location: Right arm, Patient Position: Sitting, Cuff Size: Adult Regular)   Pulse 58   Wt 81.2 kg (179 lb)   SpO2 97%   BMI 25.68 kg/m        PHYSICAL EXAMINATION  Physical Exam  HENT:      Head: Normocephalic.      Mouth/Throat:      Mouth: Mucous membranes are moist.   Eyes:      Conjunctiva/sclera: Conjunctivae normal.   Pulmonary:      Effort: Pulmonary effort is normal.      Breath sounds: Normal breath sounds.   Musculoskeletal:         General: No swelling or tenderness.      Comments: No noticeable swelling in the hands (MCP, PIP, DIP), wrists, elbows, knees, ankles, feet.     Range of motion in the shoulders is limited  Range of motion in the  hips are within accepted range for age.     SJC:0/28  TJC: 0/28    Pain scale is 4/10   Skin:     Findings: No rash.   Neurological:      Mental Status: He is alert.         Assessment & Plan    # Polymyalgia Rheumatica   # Inflammatory arthritis     # Degenerative arthritis in the knees  # Multiple comorbidities [coronary artery disease, hypertension, hyperlipidemia]  # Dilated ascending aorta   # lung nodule     # Prednisone use, chronic   # Screening for chronic infections   # Longitudinal care    Minor is following with rheumatology for polymyalgia rheumatica.  Onset: 2024  Involvement:  Bilateral shoulder and hip stiffness, elevated inflammatory markers No symptoms suggestive of GCA, responded well to prednisone and experienced flare with predominant peripheral arthritis.  Comorbid  conditions: Coronary artery disease, hypertension, hypercholesterolemia.  Treatment included: Prednisone    # Polymyalgia Rheumatica   # Inflammatory arthritis   # Dilated ascending aorta   # lung nodule     Minor is presenting today for follow-up, he remains with symptoms indicative of inflammatory arthritis despite prednisone, he had COVID 2 weeks ago which increased his joint pain, he remains on prednisone and is tolerating the medication thus far.  Labs overall stable as in HPI with exception of elevated creatinine.    We attempted to get sarilumab for uncontrolled PMR however he does not have insurance coverage and he does not qualify for patient assistance program.     He continues to not feel well despite prednisone, his CTA of the chest showing mild dilatation of the ascending aorta while this could be related to hypertension and atherosclerosis however given PMR and systemic symptoms therefore evaluation with PET scan would be reasonable to rule out an underlying giant cell arteritis, this would be helpful to evaluate for the extent of active PMR symptoms, inflammatory arthritis and assist in tailoring his treatment regimen.    If there is large vessel vasculitis on PET scan then we will request insurance authorization for tocilizumab.    If there is no evidence of giant cell arteritis on PET scan then we will proceed with an alternative agent, given elevated creatinine then methotrexate use and dosage can be limited therefore we would proceed with leflunomide DMARD therapy and steroid sparing agent.    In the meantime, he will continue slow taper of prednisone to avoid prednisone side effects and monitoring his symptoms.    Plan:  - Taper prednisone as below     - MTM Pharmacy follow up for either tocilizumab or leflunomide initiation based on PET scan results    Prednisone tapering schedule  Take prednisone 15 mg daily for 2 weeks then    prednisone 12.5 mg daily for 2 week then    prednisone 10 mg daily  till the next appointment    # High risk medication requiring lab tests for toxicity monitoring     I have discussed in detail potential adverse effects including suppression of immune system making pt prone to infections, myelosuppression, liver toxicity, avoidance of alcohol, importance of regular follow up to monitor for medication toxicity. Patient verbalized understanding and agrees to procced.     # Prednisone use, chronic   # Screening for glucocorticoid induced osteoporosis   Vitamin D, 47 (2024)  DEXA scan: Osteopenia, major fracture risk 11.6%], hip fracture risk 2.6%.     Plan:  - Continue alendronate 70 mg weeks  - Continue vitamin D supplementation    # Screening for chronic infections  2024  Hepatitis B surface antigen: Negative  Hepatitis B core: Negative  Hepatitis B surface antibody: Negative   Hepatitis C antibody: Negative   QuantiFERON gold: Negative     # Longitudinal care  The longitudinal plan of care for the diagnosis(es)/condition(s) Polymyalgia  as documented were addressed during this visit. Due to the added complexity in care, I will continue to support Minor in the subsequent management and with ongoing continuity of care.    Review of the result(s) of each unique test - as HPI  Ordering of each unique test  Prescription drug management          Return in about 2 months (around 2/4/2025).    Valdez Woodson MD  [facility] RHEUMATOLOGY      Again, thank you for allowing me to participate in the care of your patient.      Sincerely,    Valdez Woodson MD

## 2024-12-05 ENCOUNTER — MYC REFILL (OUTPATIENT)
Dept: FAMILY MEDICINE | Facility: CLINIC | Age: 64
End: 2024-12-05
Payer: COMMERCIAL

## 2024-12-05 DIAGNOSIS — I10 ESSENTIAL HYPERTENSION: ICD-10-CM

## 2024-12-05 DIAGNOSIS — I25.10 CORONARY ARTERY DISEASE INVOLVING NATIVE HEART WITHOUT ANGINA PECTORIS, UNSPECIFIED VESSEL OR LESION TYPE: ICD-10-CM

## 2024-12-05 RX ORDER — METOPROLOL SUCCINATE 25 MG/1
12.5 TABLET, EXTENDED RELEASE ORAL DAILY
Qty: 90 TABLET | Refills: 2 | OUTPATIENT
Start: 2024-12-05

## 2024-12-09 ENCOUNTER — VIRTUAL VISIT (OUTPATIENT)
Dept: PHARMACY | Facility: CLINIC | Age: 64
End: 2024-12-09
Attending: STUDENT IN AN ORGANIZED HEALTH CARE EDUCATION/TRAINING PROGRAM

## 2024-12-09 DIAGNOSIS — M19.90 INFLAMMATORY ARTHRITIS: ICD-10-CM

## 2024-12-09 DIAGNOSIS — M35.3 PMR (POLYMYALGIA RHEUMATICA) (H): Primary | ICD-10-CM

## 2024-12-09 NOTE — Clinical Note
Good afternoon! Discussed both options with Minor. He had mentioned that if we go the route of Actemra that it would be IV infusion, would this be for the entirety of therapy or is there a potential he will transition to subcutaneous? Thank you!

## 2024-12-09 NOTE — PROGRESS NOTES
Medication Therapy Management (MTM) Encounter    ASSESSMENT:                            Medication Adherence/Access: See below for considerations.    PMR (polymyalgia rheumatica)/Inflammatory Arthritis   Needs improvement. Minor is continuing to have some pain and stiffness in his hands and knees despite therapy with prednisone and would benefit from starting therapy with Actemra or leflunomide pending PET scan results. If positive the plan is to proceed with Actemra and if negative then will proceed with leflunomide Provided education on both leflunomide and Actemra today including dosing, general administration, side effects (both common/serious), precautions, monitoring and time to efficacy. Reviewed the process of receiving Actemra in the infusion center and went through location options. Also discussed the billing process for infusions. Discussed data on risk of serious infection in depth. Since our last visit Minor was able to get his RSV vaccine and is currently up-to-date on all vaccines. Discussed potential need to hold therapy in the setting of signs/symptoms of active infection and encouraged him to contact the rheumatology clinic in the event he has questions on this. Minor was wondering about drug interactions with both leflunomide or Actemra with his current medications. Both medications have minor interactions with cholesterol medications atorvastatin and ezetimibe. No need to empirically change therapy, recommend following cholesterol labs and monitoring for signs of toxicity including muscle aches/pains. Minor displayed good understanding of the plan and did not have any further questions at this time. He will reach out if questions arise once a therapy decision is made.    PLAN:                            Patient to start Actemra if PET scan positive, patient to start leflunomide if PET scan negative.  Dr. Woodson and I will be in contact and will have the correct medication prescribed once we have  results from the PET scan.  If we go the route of Actemra infusion, patient would prefer to use [facility].  Phone number [phone]  Both Actemra and Leflunomide have minor/mild interactions with your cholesterol medications. No change in therapy needed, we will check your cholesterol levels on therapy. Monitor for muscle aches and reach out if these occur.    Follow-up: with Dr. Woodson 2/6/2025, with me in around 3 months for follow-up and [facility] as needed.    SUBJECTIVE/OBJECTIVE:                          Minor Murcia is a 64 year old male seen for      Reason for visit: Actemra/Leflunomide start pending PET scan results.    Allergies/ADRs: None  Past Medical History: Reviewed in chart  Tobacco: He reports that he has never smoked. He has never used smokeless tobacco.  Alcohol: Less than 1 beverages / week    Medication Adherence/Access: no issues reported.    PMR (polymyalgia rheumatica)/Inflammatory Arthritis    - Prednisone 15 mg daily for 2 weeks then    - Prednisone 12.5 mg daily for 2 weeks then    - Prednisone 10 mg daily until next appointment with Dr. Woodson  - Alendronate 70 mg once weekly  - Calcium carbonate - vitamin D 600-10 mg-mcg once daily      Side effects: none reported.     Patient reports still having pain in his hands and some morning stiffness. Having improvement with prednisone but some recurrence of arthritis symptoms once down to 15 mg daily. Recovering from COVID ~2 weeks ago which made his joint stiffness worse. Had previously discussed starting Kevzara but encountered difficulty with insurance coverage and per visit with Dr. Woodson would not be likely to get to full dose of methotrexate. Minor feels okay about new plan and is interested in more information regarding the two new medication options (Acemra and leflunomide) pending his PET scan results.    Affected areas: hands, hips, knees, shoulders - hands are the worst     Specialist: Valdez Silveira  MD NICOL[facility], Rheumatology. Last visit on 12/4/2024.      Pre-Biologic Screening:   Hep B Surface Antibody Non-reactive (6/6/2024)    Hep B Core Antibody  Non-reactive (6/6/2024)    Hep B Surface Antigen Non-reactive (6/6/2024)    Hep C Antibody  Non-reactive (6/6/2024)    HIV Antigen Antibody Non-reactive (6/6/2024)    Quantiferon TB Gold Negative (6/6/2024)       Labs last checked: 11/29/2024    Immunization History   Covid-19 vaccine ([id number] version)  Up-to-date   Influenza (annual) Up-to-date, avoid live FluMist   Pneumococcal  Prevnar-20: Received 9/24/2024 Complete   Tetanus/Tdap  Up-to-date   Shingrix Complete   RSV (only for >= 60 years old)  Complete   All patients on biologics should avoid live vaccines (varicella/VZV, intranasal influenza, MMR, or yellow fever vaccine (if traveling))       Today's Vitals: There were no vitals taken for this visit.  ----------------    I spent 15 minutes with this patient today. All changes were made via collaborative practice agreement with Valdez Silveira MD  A copy of the visit note was provided to the patient's provider(s).    A summary of these recommendations was sent via [employer].    Wanda WenD  Medication Therapy Management Pharmacist  [facility] Rheumatology - Dermatology  Phone: [phone]    Telemedicine Visit Details  The patient's medications can be safely assessed via a telemedicine encounter.  Type of service:  Telephone visit  Originating Location (pt. Location): Home    Distant Location (provider location):  Off-site  Start Time: 11:30 AM  End Time: 11:45 AM     Medication Therapy Recommendations  No medication therapy recommendations to display

## 2024-12-09 NOTE — PATIENT INSTRUCTIONS
"Recommendations from today's MTM visit:                                                      Patient to start Actemra if PET scan positive, patient to start leflunomide if PET scan negative.  Dr. Woodson and I will be in contact and will have the correct medication prescribed once we have results from the PET scan.  If we go the route of Actemra infusion, patient would prefer to use [facility].  Phone number [phone]  Both Actemra and Leflunomide have minor/mild interactions with your cholesterol medications. No change in therapy needed, we will check your cholesterol levels on therapy. Monitor for muscle aches and reach out if these occur.    Follow-up: with Dr. Woodson 2/6/2025, with me in around 3 months for follow-up and via [employer] as needed.    It was great speaking with you today.  I value your experience and would be very thankful for your time in providing feedback in our clinic survey. In the next few days, you may receive an email or text message from [employer] with a link to a survey related to your  clinical pharmacist.\"     To schedule another MTM appointment, please call the clinic directly or you may call the MTM scheduling line at [phone].    My Clinical Pharmacist's contact information:                                                      Please feel free to contact me with any questions or concerns you have.      Take care!    Eliz Armendariz, PharmD  Medication Therapy Management Pharmacist  [facility] Rheumatology - Dermatology  "

## 2024-12-11 DIAGNOSIS — R20.2 TINGLING: Primary | ICD-10-CM

## 2024-12-13 ENCOUNTER — LAB (OUTPATIENT)
Dept: LAB | Facility: CLINIC | Age: 64
End: 2024-12-13
Payer: COMMERCIAL

## 2024-12-13 DIAGNOSIS — R20.2 TINGLING: ICD-10-CM

## 2024-12-13 LAB
FOLATE SERPL-MCNC: 16.6 NG/ML (ref 4.6–34.8)
VIT B12 SERPL-MCNC: 643 PG/ML (ref 232–1245)

## 2024-12-13 PROCEDURE — 82746 ASSAY OF FOLIC ACID SERUM: CPT

## 2024-12-13 PROCEDURE — 36415 COLL VENOUS BLD VENIPUNCTURE: CPT

## 2024-12-13 PROCEDURE — 82607 VITAMIN B-12: CPT

## 2024-12-23 DIAGNOSIS — M31.6 GCA (GIANT CELL ARTERITIS) (H): Primary | ICD-10-CM

## 2024-12-23 DIAGNOSIS — Z29.89 NEED FOR PNEUMOCYSTIS PROPHYLAXIS: ICD-10-CM

## 2024-12-23 DIAGNOSIS — Z79.2 PROPHYLACTIC ANTIBIOTIC: ICD-10-CM

## 2024-12-23 DIAGNOSIS — M35.3 PMR (POLYMYALGIA RHEUMATICA) (H): ICD-10-CM

## 2024-12-23 RX ORDER — HEPARIN SODIUM,PORCINE 10 UNIT/ML
5-20 VIAL (ML) INTRAVENOUS DAILY PRN
OUTPATIENT
Start: 2024-12-30

## 2024-12-23 RX ORDER — METHYLPREDNISOLONE SODIUM SUCCINATE 40 MG/ML
40 INJECTION INTRAMUSCULAR; INTRAVENOUS
Start: 2024-12-30

## 2024-12-23 RX ORDER — DIPHENHYDRAMINE HYDROCHLORIDE 50 MG/ML
50 INJECTION INTRAMUSCULAR; INTRAVENOUS
Start: 2024-12-30

## 2024-12-23 RX ORDER — ALBUTEROL SULFATE 90 UG/1
1-2 INHALANT RESPIRATORY (INHALATION)
Start: 2024-12-30

## 2024-12-23 RX ORDER — ALBUTEROL SULFATE 0.83 MG/ML
2.5 SOLUTION RESPIRATORY (INHALATION)
OUTPATIENT
Start: 2024-12-30

## 2024-12-23 RX ORDER — HEPARIN SODIUM (PORCINE) LOCK FLUSH IV SOLN 100 UNIT/ML 100 UNIT/ML
5 SOLUTION INTRAVENOUS
OUTPATIENT
Start: 2024-12-30

## 2024-12-23 RX ORDER — ACETAMINOPHEN 325 MG/1
650 TABLET ORAL ONCE
OUTPATIENT
Start: 2024-12-30

## 2024-12-23 RX ORDER — METHYLPREDNISOLONE SODIUM SUCCINATE 125 MG/2ML
125 INJECTION INTRAMUSCULAR; INTRAVENOUS ONCE
OUTPATIENT
Start: 2024-12-30

## 2024-12-23 RX ORDER — FAMOTIDINE 40 MG/1
40 TABLET, FILM COATED ORAL DAILY
Qty: 90 TABLET | Refills: 0 | Status: SHIPPED | OUTPATIENT
Start: 2024-12-23

## 2024-12-23 RX ORDER — ATOVAQUONE 750 MG/5ML
1500 SUSPENSION ORAL DAILY
Qty: 900 ML | Refills: 0 | Status: SHIPPED | OUTPATIENT
Start: 2024-12-23 | End: 2025-03-23

## 2024-12-23 RX ORDER — PREDNISONE 10 MG/1
TABLET ORAL
Qty: 193 TABLET | Refills: 0 | Status: SHIPPED | OUTPATIENT
Start: 2024-12-23 | End: 2025-02-17

## 2024-12-23 RX ORDER — DIPHENHYDRAMINE HCL 25 MG
25 CAPSULE ORAL ONCE
OUTPATIENT
Start: 2024-12-30

## 2024-12-23 RX ORDER — DIPHENHYDRAMINE HYDROCHLORIDE 50 MG/ML
25 INJECTION INTRAMUSCULAR; INTRAVENOUS
Start: 2024-12-30

## 2024-12-23 RX ORDER — EPINEPHRINE 1 MG/ML
0.3 INJECTION, SOLUTION, CONCENTRATE INTRAVENOUS EVERY 5 MIN PRN
OUTPATIENT
Start: 2024-12-30

## 2024-12-29 ENCOUNTER — MYC REFILL (OUTPATIENT)
Dept: CARDIOLOGY | Facility: CLINIC | Age: 64
End: 2024-12-29
Payer: COMMERCIAL

## 2024-12-29 DIAGNOSIS — I10 PRIMARY HYPERTENSION: ICD-10-CM

## 2024-12-31 ENCOUNTER — APPOINTMENT (OUTPATIENT)
Dept: RADIOLOGY | Facility: CLINIC | Age: 64
End: 2024-12-31
Attending: EMERGENCY MEDICINE
Payer: COMMERCIAL

## 2024-12-31 ENCOUNTER — APPOINTMENT (OUTPATIENT)
Dept: CARDIOLOGY | Facility: CLINIC | Age: 64
End: 2024-12-31
Attending: INTERNAL MEDICINE
Payer: COMMERCIAL

## 2024-12-31 ENCOUNTER — HOSPITAL ENCOUNTER (OUTPATIENT)
Facility: CLINIC | Age: 64
Setting detail: OBSERVATION
Discharge: HOME | End: 2024-12-31
Attending: EMERGENCY MEDICINE | Admitting: INTERNAL MEDICINE
Payer: COMMERCIAL

## 2024-12-31 VITALS
DIASTOLIC BLOOD PRESSURE: 84 MMHG | SYSTOLIC BLOOD PRESSURE: 136 MMHG | HEART RATE: 55 BPM | RESPIRATION RATE: 22 BRPM | OXYGEN SATURATION: 98 % | WEIGHT: 180 LBS | HEIGHT: 70 IN | BODY MASS INDEX: 25.77 KG/M2 | TEMPERATURE: 97.8 F

## 2024-12-31 DIAGNOSIS — R07.9 CHEST PAIN, UNSPECIFIED TYPE: ICD-10-CM

## 2024-12-31 LAB
ALBUMIN UR-MCNC: NEGATIVE MG/DL
ANION GAP SERPL CALCULATED.3IONS-SCNC: 10 MMOL/L (ref 7–15)
APPEARANCE UR: CLEAR
ATRIAL RATE - MUSE: 53 BPM
BASOPHILS # BLD AUTO: 0 10E3/UL (ref 0–0.2)
BASOPHILS NFR BLD AUTO: 0 %
BILIRUB UR QL STRIP: NEGATIVE
BUN SERPL-MCNC: 29.2 MG/DL (ref 8–23)
CALCIUM SERPL-MCNC: 8.4 MG/DL (ref 8.8–10.4)
CHLORIDE SERPL-SCNC: 106 MMOL/L (ref 98–107)
COLOR UR AUTO: ABNORMAL
CREAT SERPL-MCNC: 1.03 MG/DL (ref 0.67–1.17)
CV STRESS CURRENT BP HE: NORMAL
CV STRESS CURRENT HR HE: 100
CV STRESS CURRENT HR HE: 102
CV STRESS CURRENT HR HE: 103
CV STRESS CURRENT HR HE: 104
CV STRESS CURRENT HR HE: 111
CV STRESS CURRENT HR HE: 113
CV STRESS CURRENT HR HE: 113
CV STRESS CURRENT HR HE: 117
CV STRESS CURRENT HR HE: 122
CV STRESS CURRENT HR HE: 133
CV STRESS CURRENT HR HE: 137
CV STRESS CURRENT HR HE: 137
CV STRESS CURRENT HR HE: 58
CV STRESS CURRENT HR HE: 68
CV STRESS CURRENT HR HE: 75
CV STRESS CURRENT HR HE: 76
CV STRESS CURRENT HR HE: 77
CV STRESS CURRENT HR HE: 78
CV STRESS CURRENT HR HE: 80
CV STRESS CURRENT HR HE: 82
CV STRESS CURRENT HR HE: 84
CV STRESS CURRENT HR HE: 85
CV STRESS CURRENT HR HE: 87
CV STRESS CURRENT HR HE: 90
CV STRESS CURRENT HR HE: 97
CV STRESS DEVIATION TIME HE: NORMAL
CV STRESS ECHO PERCENT HR HE: NORMAL
CV STRESS EXERCISE STAGE HE: NORMAL
CV STRESS EXERCISE STAGE REACHED HE: NORMAL
CV STRESS FINAL RESTING BP HE: NORMAL
CV STRESS FINAL RESTING HR HE: 80
CV STRESS MAX HR HE: 137
CV STRESS MAX TREADMILL GRADE HE: 16
CV STRESS MAX TREADMILL SPEED HE: 4.2
CV STRESS PEAK DIA BP HE: NORMAL
CV STRESS PEAK SYS BP HE: NORMAL
CV STRESS PHASE HE: NORMAL
CV STRESS PROTOCOL HE: NORMAL
CV STRESS REASON STOPPED HE: NORMAL
CV STRESS RESTING PT POSITION HE: NORMAL
CV STRESS RESTING PT POSITION HE: NORMAL
CV STRESS ST DEVIATION AMOUNT HE: NORMAL
CV STRESS ST DEVIATION ELEVATION HE: NORMAL
CV STRESS ST EVELATION AMOUNT HE: NORMAL
CV STRESS SYMPTOMS HE: NORMAL
CV STRESS TEST TYPE HE: NORMAL
CV STRESS TOTAL STAGE TIME MIN 1 HE: NORMAL
DIASTOLIC BLOOD PRESSURE - MUSE: NORMAL MMHG
EGFRCR SERPLBLD CKD-EPI 2021: 81 ML/MIN/1.73M2
EOSINOPHIL # BLD AUTO: 0.1 10E3/UL (ref 0–0.7)
EOSINOPHIL NFR BLD AUTO: 1 %
ERYTHROCYTE [DISTWIDTH] IN BLOOD BY AUTOMATED COUNT: 12.9 % (ref 10–15)
GLUCOSE SERPL-MCNC: 97 MG/DL (ref 70–99)
GLUCOSE UR STRIP-MCNC: NEGATIVE MG/DL
HCO3 SERPL-SCNC: 23 MMOL/L (ref 22–29)
HCT VFR BLD AUTO: 38.4 % (ref 40–53)
HGB BLD-MCNC: 12.7 G/DL (ref 13.3–17.7)
HGB UR QL STRIP: NEGATIVE
HOLD SPECIMEN: NORMAL
IMM GRANULOCYTES # BLD: 0.1 10E3/UL
IMM GRANULOCYTES NFR BLD: 1 %
INTERPRETATION ECG - MUSE: NORMAL
KETONES UR STRIP-MCNC: NEGATIVE MG/DL
LEUKOCYTE ESTERASE UR QL STRIP: NEGATIVE
LYMPHOCYTES # BLD AUTO: 1.7 10E3/UL (ref 0.8–5.3)
LYMPHOCYTES NFR BLD AUTO: 12 %
MCH RBC QN AUTO: 30 PG (ref 26.5–33)
MCHC RBC AUTO-ENTMCNC: 33.1 G/DL (ref 31.5–36.5)
MCV RBC AUTO: 91 FL (ref 78–100)
MONOCYTES # BLD AUTO: 1.3 10E3/UL (ref 0–1.3)
MONOCYTES NFR BLD AUTO: 9 %
MUCOUS THREADS #/AREA URNS LPF: PRESENT /LPF
NEUTROPHILS # BLD AUTO: 11.3 10E3/UL (ref 1.6–8.3)
NEUTROPHILS NFR BLD AUTO: 78 %
NITRATE UR QL: NEGATIVE
NRBC # BLD AUTO: 0 10E3/UL
NRBC BLD AUTO-RTO: 0 /100
P AXIS - MUSE: 64 DEGREES
PH UR STRIP: 7 [PH] (ref 5–7)
PLATELET # BLD AUTO: 256 10E3/UL (ref 150–450)
POTASSIUM SERPL-SCNC: 4.3 MMOL/L (ref 3.4–5.3)
PR INTERVAL - MUSE: 178 MS
QRS DURATION - MUSE: 114 MS
QT - MUSE: 428 MS
QTC - MUSE: 401 MS
R AXIS - MUSE: 45 DEGREES
RBC # BLD AUTO: 4.23 10E6/UL (ref 4.4–5.9)
RBC URINE: <1 /HPF
SODIUM SERPL-SCNC: 139 MMOL/L (ref 135–145)
SP GR UR STRIP: 1.01 (ref 1–1.03)
STRESS ECHO BASELINE DIASTOLIC HE: 83
STRESS ECHO BASELINE HR: 58
STRESS ECHO BASELINE SYSTOLIC BP: 149
STRESS ECHO LAST STRESS DIASTOLIC BP: 76
STRESS ECHO LAST STRESS HR: 137
STRESS ECHO LAST STRESS SYSTOLIC BP: 178
STRESS ECHO POST ESTIMATED WORKLOAD: 11.5
STRESS ECHO POST EXERCISE DUR MIN: 9
STRESS ECHO POST EXERCISE DUR SEC: 45
STRESS ECHO TARGET HR: 132
SYSTOLIC BLOOD PRESSURE - MUSE: NORMAL MMHG
T AXIS - MUSE: 42 DEGREES
TROPONIN T SERPL HS-MCNC: 16 NG/L
TROPONIN T SERPL HS-MCNC: 19 NG/L
UROBILINOGEN UR STRIP-MCNC: <2 MG/DL
VENTRICULAR RATE- MUSE: 53 BPM
WBC # BLD AUTO: 14.4 10E3/UL (ref 4–11)
WBC URINE: <1 /HPF

## 2024-12-31 PROCEDURE — 93306 TTE W/DOPPLER COMPLETE: CPT

## 2024-12-31 PROCEDURE — 93321 DOPPLER ECHO F-UP/LMTD STD: CPT | Mod: 26 | Performed by: INTERNAL MEDICINE

## 2024-12-31 PROCEDURE — 80048 BASIC METABOLIC PNL TOTAL CA: CPT | Performed by: EMERGENCY MEDICINE

## 2024-12-31 PROCEDURE — 93016 CV STRESS TEST SUPVJ ONLY: CPT | Mod: 59 | Performed by: INTERNAL MEDICINE

## 2024-12-31 PROCEDURE — 250N000013 HC RX MED GY IP 250 OP 250 PS 637: Performed by: EMERGENCY MEDICINE

## 2024-12-31 PROCEDURE — 99214 OFFICE O/P EST MOD 30 MIN: CPT | Performed by: INTERNAL MEDICINE

## 2024-12-31 PROCEDURE — 81001 URINALYSIS AUTO W/SCOPE: CPT | Performed by: INTERNAL MEDICINE

## 2024-12-31 PROCEDURE — 36415 COLL VENOUS BLD VENIPUNCTURE: CPT | Performed by: EMERGENCY MEDICINE

## 2024-12-31 PROCEDURE — 93325 DOPPLER ECHO COLOR FLOW MAPG: CPT | Mod: TC

## 2024-12-31 PROCEDURE — 93350 STRESS TTE ONLY: CPT | Mod: 26 | Performed by: INTERNAL MEDICINE

## 2024-12-31 PROCEDURE — 93018 CV STRESS TEST I&R ONLY: CPT | Mod: 59 | Performed by: INTERNAL MEDICINE

## 2024-12-31 PROCEDURE — 99285 EMERGENCY DEPT VISIT HI MDM: CPT | Mod: 25

## 2024-12-31 PROCEDURE — 93306 TTE W/DOPPLER COMPLETE: CPT | Mod: 26 | Performed by: INTERNAL MEDICINE

## 2024-12-31 PROCEDURE — G0378 HOSPITAL OBSERVATION PER HR: HCPCS

## 2024-12-31 PROCEDURE — 85014 HEMATOCRIT: CPT | Performed by: EMERGENCY MEDICINE

## 2024-12-31 PROCEDURE — 99418 PROLNG IP/OBS E/M EA 15 MIN: CPT | Performed by: INTERNAL MEDICINE

## 2024-12-31 PROCEDURE — 84484 ASSAY OF TROPONIN QUANT: CPT | Performed by: EMERGENCY MEDICINE

## 2024-12-31 PROCEDURE — 82310 ASSAY OF CALCIUM: CPT | Performed by: EMERGENCY MEDICINE

## 2024-12-31 PROCEDURE — 71046 X-RAY EXAM CHEST 2 VIEWS: CPT

## 2024-12-31 PROCEDURE — 93005 ELECTROCARDIOGRAM TRACING: CPT | Performed by: EMERGENCY MEDICINE

## 2024-12-31 PROCEDURE — 85025 COMPLETE CBC W/AUTO DIFF WBC: CPT | Performed by: EMERGENCY MEDICINE

## 2024-12-31 PROCEDURE — 99223 1ST HOSP IP/OBS HIGH 75: CPT | Performed by: INTERNAL MEDICINE

## 2024-12-31 PROCEDURE — 93325 DOPPLER ECHO COLOR FLOW MAPG: CPT | Mod: 26 | Performed by: INTERNAL MEDICINE

## 2024-12-31 RX ORDER — ASPIRIN 81 MG/1
81 TABLET ORAL DAILY
Status: DISCONTINUED | OUTPATIENT
Start: 2025-01-01 | End: 2024-12-31 | Stop reason: HOSPADM

## 2024-12-31 RX ORDER — MAGNESIUM HYDROXIDE/ALUMINUM HYDROXICE/SIMETHICONE 120; 1200; 1200 MG/30ML; MG/30ML; MG/30ML
30 SUSPENSION ORAL EVERY 4 HOURS PRN
Status: DISCONTINUED | OUTPATIENT
Start: 2024-12-31 | End: 2024-12-31 | Stop reason: HOSPADM

## 2024-12-31 RX ORDER — ACETAMINOPHEN 325 MG/1
650 TABLET ORAL EVERY 4 HOURS PRN
Status: DISCONTINUED | OUTPATIENT
Start: 2024-12-31 | End: 2024-12-31 | Stop reason: HOSPADM

## 2024-12-31 RX ORDER — NITROGLYCERIN 0.4 MG/1
0.4 TABLET SUBLINGUAL EVERY 5 MIN PRN
Status: CANCELLED | OUTPATIENT
Start: 2024-12-31

## 2024-12-31 RX ORDER — FAMOTIDINE 20 MG/1
40 TABLET, FILM COATED ORAL DAILY
Status: CANCELLED | OUTPATIENT
Start: 2025-01-01

## 2024-12-31 RX ORDER — ATOVAQUONE 750 MG/5ML
1500 SUSPENSION ORAL DAILY
Status: CANCELLED | OUTPATIENT
Start: 2025-01-01

## 2024-12-31 RX ORDER — AMLODIPINE BESYLATE 5 MG/1
5 TABLET ORAL DAILY
Status: CANCELLED | OUTPATIENT
Start: 2025-01-01

## 2024-12-31 RX ORDER — ASPIRIN 81 MG/1
81 TABLET ORAL DAILY
Status: CANCELLED | OUTPATIENT
Start: 2025-01-01

## 2024-12-31 RX ORDER — ATORVASTATIN CALCIUM 40 MG/1
80 TABLET, FILM COATED ORAL DAILY
Status: CANCELLED | OUTPATIENT
Start: 2025-01-01

## 2024-12-31 RX ORDER — ASPIRIN 81 MG/1
324 TABLET, CHEWABLE ORAL ONCE
Status: DISCONTINUED | OUTPATIENT
Start: 2024-12-31 | End: 2024-12-31

## 2024-12-31 RX ORDER — ASPIRIN 81 MG/1
162 TABLET, CHEWABLE ORAL ONCE
Status: COMPLETED | OUTPATIENT
Start: 2024-12-31 | End: 2024-12-31

## 2024-12-31 RX ORDER — NITROGLYCERIN 0.4 MG/1
0.4 TABLET SUBLINGUAL EVERY 5 MIN PRN
Status: DISCONTINUED | OUTPATIENT
Start: 2024-12-31 | End: 2024-12-31 | Stop reason: HOSPADM

## 2024-12-31 RX ORDER — ACETAMINOPHEN 650 MG/1
650 SUPPOSITORY RECTAL EVERY 4 HOURS PRN
Status: DISCONTINUED | OUTPATIENT
Start: 2024-12-31 | End: 2024-12-31 | Stop reason: HOSPADM

## 2024-12-31 RX ORDER — EZETIMIBE 10 MG/1
10 TABLET ORAL DAILY
Status: CANCELLED | OUTPATIENT
Start: 2025-01-01

## 2024-12-31 RX ORDER — OXYCODONE HYDROCHLORIDE 5 MG/1
5-10 TABLET ORAL
Status: CANCELLED | OUTPATIENT
Start: 2024-12-31

## 2024-12-31 RX ORDER — LISINOPRIL 10 MG/1
20 TABLET ORAL DAILY
Status: CANCELLED | OUTPATIENT
Start: 2025-01-01

## 2024-12-31 RX ADMIN — ASPIRIN 81 MG CHEWABLE TABLET 162 MG: 81 TABLET CHEWABLE at 08:53

## 2024-12-31 ASSESSMENT — ACTIVITIES OF DAILY LIVING (ADL)
ADLS_ACUITY_SCORE: 56
ADLS_ACUITY_SCORE: 57
ADLS_ACUITY_SCORE: 54
ADLS_ACUITY_SCORE: 54
ADLS_ACUITY_SCORE: 57
ADLS_ACUITY_SCORE: 56

## 2024-12-31 ASSESSMENT — COLUMBIA-SUICIDE SEVERITY RATING SCALE - C-SSRS
2. HAVE YOU ACTUALLY HAD ANY THOUGHTS OF KILLING YOURSELF IN THE PAST MONTH?: NO
1. IN THE PAST MONTH, HAVE YOU WISHED YOU WERE DEAD OR WISHED YOU COULD GO TO SLEEP AND NOT WAKE UP?: NO
6. HAVE YOU EVER DONE ANYTHING, STARTED TO DO ANYTHING, OR PREPARED TO DO ANYTHING TO END YOUR LIFE?: NO

## 2024-12-31 NOTE — DISCHARGE SUMMARY
"[facility]  Hospitalist Discharge Summary      Date of Admission:  12/31/2024  Date of Discharge:  12/31/2024  Discharging Provider: Kamran Santana DO  Discharge Service: Hospitalist Service    Discharge Diagnoses   Chest pain  Coronary artery disease  Sinus bradycardia, PVCs  Essential hypertension   Dyslipidemia   Polymyalgia rheumatica  Giant cell arteritis  Normocytic anemia      Clinically Significant Risk Factors     # Overweight: Estimated body mass index is 25.83 kg/m  as calculated from the following:    Height as of this encounter: 1.778 m (5' 10\").    Weight as of this encounter: 81.6 kg (180 lb).       Follow-ups Needed After Discharge   Follow-up Appointments       Follow-up and recommended labs and tests       Follow up with primary care provider, Valentino Lu MD, within 7 days for hospital follow- up, chest pain evaluation.              Discharge Disposition   Discharged to home  Condition at discharge: Stable    Hospital Course   Minor Murcia is a 64 year old male with history of coronary artery disease status post PTCA to RCA and LAD in 2005, with repeat PCI and stenting of mid LAD in April 2017 for in-stent restenosis.  Patient also with history of hypertension, dyslipidemia, polymyalgia rheumatica diagnosed in May 2024 and recent diagnosis of giant cell arteritis 3 weeks ago admitted on 12/31/2024 for chest pressure while working on 12/31/2024.  Evaluation was unremarkable with negative EKG, serial troponins and Echocardiogram.  Patient underwent stress echocardiogram which was normal without evidence of stress induced ischemia.  He had no chest pain with exercise during the stress test.  Patient was asymptomatic at discharge and recommended follow up with PCP.  Cardiology consulted and agreed with discharge plan.    Consultations This Hospital Stay   CARDIOLOGY IP CONSULT    Code Status   Full Code    Time Spent on this Encounter   I, Kamran Santana DO, " personally saw the patient today and spent greater than 30 minutes discharging this patient.       DO NICOLA Patrick [facility] EMERGENCY ROOM  [address] [address]  Phone: [phone]  Fax: [phone]  ______________________________________________________________________    Physical Exam   Vital Signs: Temp: 97.8  F (36.6  C) Temp src: Temporal BP: 136/84 Pulse: 55   Resp: 22 SpO2: 98 % O2 Device: None (Room air)    Weight: 180 lbs 0 oz  General Appearance: No apparent distress.  Well-nourished  Eyes: EOMI x 2, PERRLA.  HEENT: Normocephalic atraumatic, neck supple, trachea midline.  No JVD.  Respiratory: Clear to auscultation bilaterally no wheeze rales rhonchi  Cardiovascular: Bradycardia regular.  No murmur bruit rub or gallop  GI: Nondistended, normoactive bowel sounds, abdomen is soft to palpation nontender.  Genitourinary: Deferred  Skin: Warm and dry  Musculoskeletal: Moving all extremities spontaneously and purposefully.  No focal muscle weakness.  Neurologic: Awake alert oriented x 3.  Cranial nerves II through XII intact.  General sensation intact.  Psychiatric: Normal mood and affect       Primary Care Physician   Valentino Lu MD    Discharge Orders      Reason for your hospital stay    Chest pain evaluation.     Follow-up and recommended labs and tests     Follow up with primary care provider, Valentino Lu MD, within 7 days for hospital follow- up, chest pain evaluation.     Activity    Your activity upon discharge: activity as tolerated     When to contact your care team    Call your primary doctor if you have any of the following:  increased shortness of breath, increased pain, or palpitations.     Diet    Follow this diet upon discharge: Current Diet:Orders Placed This Encounter      Regular diet.       Significant Results and Procedures   Most Recent 3 CBC's:  Recent Labs   Lab Test 12/31/24  0845 11/29/24  0941 10/04/24  0722    WBC 14.4* 10.8 8.0   HGB 12.7* 13.8 12.1*   MCV 91 93 93    240 263     Most Recent 3 BMP's:  Recent Labs   Lab Test 12/31/24  0845 11/29/24  0941 10/04/24  0722 07/03/24  1545 05/24/24  0724 05/23/24  2042     --   --   --  140 141   POTASSIUM 4.3  --   --   --  3.8 3.9   CHLORIDE 106  --   --   --  108* 107   CO2 23  --   --   --  22 22   BUN 29.2*  --   --   --  18.4 23.4*   CR 1.03 1.21* 1.15   < > 0.82 0.91   ANIONGAP 10  --   --   --  10 12   CHRISSY 8.4*  --   --   --  8.8 8.8   GLC 97  --   --   --  112* 128*    < > = values in this interval not displayed.     Most Recent 2 LFT's:  Recent Labs   Lab Test 11/29/24  0941 10/04/24  0722 07/03/24  1545 05/24/24  0724 05/23/24  2042   AST 26 33   < > 19 21   ALT  --   --   --  22 27   ALKPHOS  --   --   --  66 66   BILITOTAL  --   --   --  0.3 0.5    < > = values in this interval not displayed.     Most Recent 3 Troponin's:No lab results found.  Most Recent 3 BNP's:No lab results found.  Most Recent D-dimer:  Recent Labs   Lab Test 05/01/24  1639   DD <0.27     Most Recent Cholesterol Panel:  Recent Labs   Lab Test 05/01/24  1639   CHOL 133   LDL 82   HDL 40   TRIG 57     Most Recent Urinalysis:  Recent Labs   Lab Test 12/31/24  1420 06/24/24  1102   COLOR Light Yellow Yellow   APPEARANCE Clear Clear   URINEGLC Negative Negative   URINEBILI Negative Negative   URINEKETONE Negative Negative   SG 1.013 1.010   UBLD Negative Negative   URINEPH 7.0 5.5   PROTEIN Negative Negative   UROBILINOGEN  --  0.2   NITRITE Negative Negative   LEUKEST Negative Negative   RBCU <1 0-2   WBCU <1 0-5   ,   Results for orders placed or performed during the hospital encounter of 12/31/24   XR Chest 2 Views    Narrative    EXAM: XR CHEST 2 VIEWS  LOCATION: [facility]  DATE: 12/31/2024    INDICATION: chest pain  COMPARISON: 5/23/2024      Impression    IMPRESSION: Negative chest.   Echocardiogram Complete    Narrative     [id number]  [id number]  [id number]  [id number]^AKIRA^[place] [address]     Name: LALO VANN  MRN: [id number]  : 1960  Study Date: 2024 12:02 PM  Age: 64 yrs  Gender: Male  Patient Location: [facility]  Reason For Study: Chest Discomfort  Ordering Physician: ROSITA CHAMPION  Performed By: MARICRUZ     BSA: 2.0 m2  Height: 70 in  Weight: 179 lb  HR: 65  BP: 142/81 mmHg  ______________________________________________________________________________  Procedure  Echocardiogram with two-dimensional, color and spectral Doppler.  ______________________________________________________________________________  Interpretation Summary     1. Normal left ventricular size and systolic performance with a visually  estimated ejection fraction of 60%.  2. There is mild aortic insufficiency.  3. There is mild to moderate mitral insufficiency.  4. There is mild left atrial enlargement.  5. There is mild enlargement of the proximal ascending aorta.  ______________________________________________________________________________  Left ventricle:  Normal left ventricular size and systolic performance with a visually  estimated ejection fraction of 60%. Left ventricular wall thickness is normal.  There is normal regional wall motion.     Assessment of LV Diastolic Function: The cumulative findings are indeterminate  in the evaluation of diastolic function [The septal e' velocity is > 7 cm/s &  lateral e' velocity is < 10 cm/s. The average E/e' is < 14. The TR velocity  cannot be determined due to insufficient tricuspid insufficiency signal. Left  atrial volume index is less than 34 mL/mÂ ].     Right ventricle:  Normal right ventricular size and systolic performance.     Left atrium:  There is mild left atrial enlargement.     Right atrium:  The right atrium is of normal size.     IVC:  The IVC is of normal caliber.     Aortic valve:  The aortic valve is comprised of three cusps.  There is no significant aortic  stenosis. There is mild aortic insufficiency.     Mitral valve:  The mitral valve appears morphologically normal. There is mild to moderate  mitral insufficiency.     Tricuspid valve:  The tricuspid valve is grossly morphologically normal. There is mild tricuspid  insufficiency.     Pulmonic valve:  The pulmonic valve is grossly morphologically normal. There is trace pulmonic  insufficiency.     Thoracic aorta:  There is mild enlargement of the proximal ascending aorta.     Pericardium:  There is no significant pericardial effusion.  ______________________________________________________________________________  ______________________________________________________________________________  MMode/2D Measurements & Calculations  IVSd: 1.1 cm  LVIDd: 5.0 cm  LVIDs: 2.8 cm  LVPWd: 1.0 cm  FS: 44.8 %  LV mass(C)d: 203.8 grams  LV mass(C)dI: 102.4 grams/m2  Ao root diam: 3.7 cm  asc Aorta Diam: 4.1 cm  LVOT diam: 2.5 cm  LVOT area: 4.9 cm2  Ao root diam index Ht(cm/m): 2.1  Ao root diam index BSA (cm/m2): 1.8  Asc Ao diam index BSA (cm/m2): 2.1  Asc Ao diam index Ht(cm/m): 2.3  EF Biplane: 66.2 %  LA Volume (BP): 72.9 ml     LA Volume Index (BP): 36.6 ml/m2  LA Volume Indexed (AL/bp): 38.3 ml/m2  RWT: 0.42  TAPSE: 3.3 cm     Time Measurements  MM HR: 61.0 BPM     Doppler Measurements & Calculations  MV E max lobo: 79.2 cm/sec  MV A max lobo: 44.6 cm/sec  MV E/A: 1.8  MV dec slope: 370.2 cm/sec2  MV dec time: 0.21 sec  Ao V2 max: 165.6 cm/sec  Ao max P.0 mmHg  Ao V2 mean: 118.0 cm/sec  Ao mean P.2 mmHg  Ao V2 VTI: 42.1 cm  RASHMI(I,D): 3.2 cm2  RASHMI(V,D): 3.4 cm2  LV V1 max P.2 mmHg  LV V1 max: 114.5 cm/sec  LV V1 VTI: 27.7 cm  SV(LVOT): 135.7 ml  SI(LVOT): 68.2 ml/m2  Pulm Sys Lobo: 78.5 cm/sec  Pulm Sexton Lobo: 67.8 cm/sec  Pulm A Revs Lobo: 23.4 cm/sec  Pulm S/D: 1.2  AV Lobo Ratio (DI): 0.69  RASHMI Index (cm2/m2): 1.6  E/E': 13.0  E/E' av.4  Lateral E/e': 9.2     Medial E/e':  9.6  Peak E' Lobo: 6.1 cm/sec  RV S Lobo: 16.2 cm/sec     ______________________________________________________________________________  Report approved by: oTm Cole MD on 12/31/2024 01:44 PM             Discharge Medications   Current Discharge Medication List        CONTINUE these medications which have NOT CHANGED    Details   acetaminophen (TYLENOL) 160 MG TBDP Take 500 mg by mouth 2 times daily as needed for mild pain      amLODIPine (NORVASC) 5 MG tablet Take 1 tablet (5 mg) by mouth daily  Qty: 90 tablet, Refills: 1    Associated Diagnoses: Primary hypertension      aspirin 81 MG EC tablet [ASPIRIN 81 MG EC TABLET] Take 81 mg by mouth daily.      atorvastatin (LIPITOR) 80 MG tablet Take 1 tablet (80 mg) by mouth daily  Qty: 90 tablet, Refills: 3    Associated Diagnoses: Coronary artery disease involving native heart without angina pectoris, unspecified vessel or lesion type      atovaquone (MEPRON) 750 MG/5ML suspension Take 10 mLs (1,500 mg) by mouth daily.  Qty: 900 mL, Refills: 0    Associated Diagnoses: GCA (giant cell arteritis) (H); PMR (polymyalgia rheumatica) (H); Prophylactic antibiotic; Need for pneumocystis prophylaxis      calcium carbonate-vitamin D (CALTRATE) 600-10 MG-MCG per tablet Take 1 tablet by mouth every evening.      ezetimibe (ZETIA) 10 MG tablet Take 1 tablet (10 mg) by mouth daily.  Qty: 90 tablet, Refills: 3    Associated Diagnoses: Coronary artery disease involving native heart without angina pectoris, unspecified vessel or lesion type; Mixed hyperlipidemia      famotidine (PEPCID) 40 MG tablet Take 1 tablet (40 mg) by mouth daily.  Qty: 90 tablet, Refills: 0    Associated Diagnoses: GCA (giant cell arteritis) (H); PMR (polymyalgia rheumatica) (H)      Ibuprofen (ADVIL PO) Take 600 mg by mouth 3 times daily as needed for moderate pain      lisinopril (ZESTRIL) 20 MG tablet Take 1 tablet (20 mg) by mouth daily  Qty: 90 tablet, Refills: 2    Associated Diagnoses: Essential  hypertension      Magnesium 400 MG TABS Take 1 tablet by mouth daily      metoprolol succinate ER (TOPROL XL) 25 MG 24 hr tablet Take 0.5 tablets (12.5 mg) by mouth daily.  Qty: 90 tablet, Refills: 2    Associated Diagnoses: Essential hypertension; Coronary artery disease involving native heart without angina pectoris, unspecified vessel or lesion type      multivitamin (CENTRUM SILVER) tablet Take 1 tablet by mouth daily      nitroGLYcerin (NITROSTAT) 0.4 MG sublingual tablet For chest pain place 1 tablet under the tongue every 5 minutes for 3 doses. If symptoms persist 5 minutes after 1st dose call 911.  Qty: 25 tablet, Refills: 1    Associated Diagnoses: Cardiovascular disease      oxyCODONE (ROXICODONE) 5 MG tablet Take 1-2 tablets (5-10 mg) by mouth nightly as needed for severe pain  Qty: 14 tablet, Refills: 0    Associated Diagnoses: Bilateral hip pain; Neck pain; Acute pain of both shoulders      predniSONE (DELTASONE) 10 MG tablet Take 6 tablets (60 mg) by mouth daily for 7 days, THEN 5 tablets (50 mg) daily for 7 days, THEN 4 tablets (40 mg) daily for 7 days, THEN 3.5 tablets (35 mg) daily for 7 days, THEN 3 tablets (30 mg) daily for 7 days, THEN 2.5 tablets (25 mg) daily for 7 days, THEN 2 tablets (20 mg) daily for 7 days, THEN 1.5 tablets (15 mg) daily for 7 days.  Qty: 193 tablet, Refills: 0    Associated Diagnoses: GCA (giant cell arteritis) (H); PMR (polymyalgia rheumatica) (H)      sarilumab (KEVZARA) 200 MG/1.14ML injection Inject 1.14 mLs (200 mg) subcutaneously every 14 days.  Qty: 2.28 mL, Refills: 2    Associated Diagnoses: PMR (polymyalgia rheumatica) (H); Inflammatory arthritis      sildenafil (VIAGRA) 50 MG tablet Take 1 tablet (50 mg) by mouth daily as needed (ED)  Qty: 30 tablet, Refills: 1    Associated Diagnoses: Erectile dysfunction, unspecified erectile dysfunction type      alendronate (FOSAMAX) 70 MG tablet Take 1 tablet (70 mg) by mouth every 7 days.  Qty: 12 tablet, Refills: 1     Associated Diagnoses: Osteopenia of hip, unspecified laterality           Allergies   No Known Allergies

## 2024-12-31 NOTE — ED TRIAGE NOTES
Pt arrived by EMS, sudden onset chest heaviness at work resolved after 20 mins, hx 3 stents in  past, on prednisone for dilated aorta. Currently pain free. BP elevated takes CRISTIAN meds, .  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 Sx lasted about 20 mins states got worse during that time. Pt states now all pain is gone and tingling. Pt was diaphoretic with this episode, denies shortness of breath.

## 2024-12-31 NOTE — CONSULTS
" [facility]   [address], [place] [address]   [email or web address]   OFFICE: [phone]        Impression and Plan     1.  Coronary artery disease.  Minor has known coronary artery disease having had PCI with stenting to the distal right coronary artery & really LAD in 2005.  He underwent repeat angiography 14 April 2017 and was found to have in-stent restenosis and had successful PCI with stenting of proximal-mid LAD (2.5 x 32 mm Synergy drug-eluting stent).    Initial troponin within normal limits at 19 ng/L.  ECG on presentation is normal and without concerning findings.  Patient states discomfort resolved without intervention after approximately 20-30 minutes and he now feels \"fine\".  Recommend:  Agree with trending troponin.  Will obtain echocardiogram to exclude any evidence of wall motion abnormality or other contributing pathology.  Further workup and recommendations pending clinical course and aforementioned tests.    2.  Hypertension.  Continue outpatient management and will follow.    3.  Dyslipidemia.  Lipid profile 1 May 2024 revealed LDL 82 mg/dL and HDL 40 mg/dL.  Continue atorvastatin 80 mg daily.  Continue ezetimibe 10 mg daily.    4.  Polymyalgia rheumatica.  Patient has been followed by Dr. Valdez Woodson in the Rheumatology Clinic.    Primary Cardiologist: Dr. Julio César Ro    History of Present Illness    Minor Murcia is a 64 year old male known coronary artery disease having had PCI with stenting to the distal right coronary artery & really LAD in 2005.  He underwent repeat angiography 14 April 2017 and was found to have in-stent restenosis and had successful PCI with stenting of proximal-mid LAD (2.5 x 32 mm Synergy drug-eluting stent).    Minor now presents for evaluation of chest discomfort.  Patient had reported chest discomfort while at work on day of presentation.  Discomfort had lasted approximately 20 minutes.  He had also " "reported some \"tingling\" in his jaw.  He states his discomfort resolved without intervention after approximately 20-30 minutes and he now feels \"fine\".  He does indicate that some features were somewhat reminiscent of his heart issues in the past, but other features a bit different.    Further review of systems is otherwise negative/noncontributory (medical record and 13 point review of systems reviewed as well and pertinent positives noted).    Cardiac Diagnostics   Telemetry (personally reviewed): Sinus rhythm.    Stress echocardiogram 26 September 2022 (personally reviewed):  Normal stress echocardiogram without evidence of stress induced ischemia.  Normal resting LV systolic performance with an ejection fraction of 60%. There is normal improvement in left ventricular systolic performance with a peak ejection fraction of 70-75%.  No ECG evidence of ischemia.  No anginal chest pain reported with exercise.  Good functional capacity for age.    Coronary angiogram 14 April 2017:  Left main coronary artery: Minimal disease.  Left anterior descending coronary: 90% proximal-mid stenosis.  Circumflex coronary artery: Mild disease.  Right coronary artery: Mild disease.  Successful PCI with stenting of proximal-mid LAD (2.5 x 32 mm Synergy drug-eluting stent).    CT coronary angiogram for April 2017:  Widely patent 3.0 Taxus stent in right coronary artery  Overlapping 2.5 mm Taxus stents in ostial to proximal left anterior descending. Cannot exclude in-stent restenosis. Moderate to severe stenosis suggested just proximal to tented segment  Mild dilatation of the ascending aorta    Ambulatory monitor June 2024:  Sinus rhythm average 63 bpm, range 39 to 151 bpm.  PACs 17.5%.  PVCs less than 1%.  Numerous runs of paroxysmal atrial tachycardia, longest 5 minutes and 6 seconds, no symptoms.    Twelve-lead ECG (personally reviewed) 31 December 2024: Sinus rhythm with heart rate of 53 bpm.  No significant ST/T wave " "changes.    Chest radiograph 31 December 2024:  Negative chest.    Medical History  Surgical History   Past Medical History:   Diagnosis Date    Coronary artery disease     High cholesterol     Hypertension     Myocardial infarction (H) 2005      Past Surgical History:   Procedure Laterality Date    CARDIAC CATHETERIZATION      CORONARY STENT PLACEMENT  2005    [place] CATH PLACEMENT & NJX CORONARY ART ANGIO IMG S&I N/A 4/14/2017    Procedure: Coronary Angiogram;  Surgeon: Roverto Hollis MD;  Location: [facility] Cath Lab;  Service: Cardiology    [place] CATH PLMT L HRT & ARTS W/NJX & ANGIO IMG S&I Left 4/14/2017    Procedure: Left Heart Catheterization Without Left Ventriculogram;  Surgeon: Roverto Hollis MD;  Location: [facility] Cath Lab;  Service: Cardiology          Family History/Social History/Risk Factors   Patient does not smoke.  Family history reviewed, and   Family History   Problem Relation Age of Onset    Cancer Mother     Coronary Artery Disease Father           Physical Examination   BP (!) 148/77   Pulse 55   Temp 97.8  F (36.6  C) (Temporal)   Resp 14   Ht 1.778 m (5' 10\")   Wt 81.6 kg (180 lb)   SpO2 98%   BMI 25.83 kg/m    Wt Readings from Last 5 Encounters:   12/31/24 81.6 kg (180 lb)   12/04/24 81.2 kg (179 lb)   11/01/24 80.7 kg (178 lb)   09/04/24 82.1 kg (181 lb)   07/10/24 81.6 kg (180 lb)     Wt Readings from Last 3 Encounters:   12/31/24 81.6 kg (180 lb)   12/04/24 81.2 kg (179 lb)   11/01/24 80.7 kg (178 lb)     No intake or output data in the 24 hours ending 12/31/24 1128      The patient is alert and oriented times three. Sclerae are anicteric. Mucosal membranes are moist. Jugular venous pressure is normal. No significant adenopathy/thyromegally appreciated. Lungs are clear with good expansion. On cardiovascular exam, the patient has a regular S1 and S2. Abdomen is soft and non-tender. Extremities reveal no clubbing, cyanosis, or edema.           Recent Labs   Lab 12/31/24  0845   WBC " "14.4*   HGB 12.7*   MCV 91         POTASSIUM 4.3   CHLORIDE 106   CO2 23   BUN 29.2*   CR 1.03   ANIONGAP 10   CHRISSY 8.4*   GLC 97     Recent Labs   Lab Test 03/09/18  1034   *     No lab results found in last 7 days.    Invalid input(s): \"LDLCALC\"  Lab Results   Component Value Date     12/31/2024    CO2 23 12/31/2024    CO2 23 09/20/2021    BUN 29.2 12/31/2024    BUN 18 09/20/2021     Lab Results   Component Value Date    WBC 14.4 12/31/2024    HGB 12.7 12/31/2024    HCT 38.4 12/31/2024    MCV 91 12/31/2024     12/31/2024     Lab Results   Component Value Date    CHOL 133 05/01/2024    TRIG 57 05/01/2024    HDL 40 05/01/2024     Recent Labs   Lab Test 05/01/24  1639   CHOL 133   HDL 40   LDL 82   TRIG 57     No results found for: \"INR\"  No results found for: \"CKTOTAL\", \"CKMB\", \"TROPONINI\"  Lab Results   Component Value Date    TSH 0.38 05/29/2024         Current Inpatient Scheduled Medications   Scheduled Meds:  No current facility-administered medications for this encounter.     Continuous Infusions:  No current facility-administered medications for this encounter.          Medications Prior to Admission   Prior to Admission medications    Medication Sig Start Date End Date Taking? Authorizing Provider   acetaminophen (TYLENOL) 160 MG TBDP Take 500 mg by mouth 2 times daily as needed for mild pain    Reported, Patient   alendronate (FOSAMAX) 70 MG tablet Take 1 tablet (70 mg) by mouth every 7 days. 9/4/24   Valdez Woodson MD   amLODIPine (NORVASC) 5 MG tablet Take 1 tablet (5 mg) by mouth daily 6/17/24   Julio César Ro DO   aspirin 81 MG EC tablet [ASPIRIN 81 MG EC TABLET] Take 81 mg by mouth daily. 5/12/15   Provider, Historical   atorvastatin (LIPITOR) 80 MG tablet Take 1 tablet (80 mg) by mouth daily 6/17/24   Valentino Lu MD   atovaquone (MEPRON) 750 MG/5ML suspension Take 10 mLs (1,500 mg) by mouth daily. 12/23/24 3/23/25  Valdez Woodson MD   calcium " carbonate-vitamin D (CALTRATE) 600-10 MG-MCG per tablet Take 1 tablet by mouth daily.    Reported, Patient   ezetimibe (ZETIA) 10 MG tablet Take 1 tablet (10 mg) by mouth daily. 9/23/24   Valentino Lu MD   famotidine (PEPCID) 40 MG tablet Take 1 tablet (40 mg) by mouth daily. 12/23/24   Valdez Woodson MD   Ibuprofen (ADVIL PO) Take 600 mg by mouth 3 times daily as needed for moderate pain    Reported, Patient   lisinopril (ZESTRIL) 20 MG tablet Take 1 tablet (20 mg) by mouth daily 8/16/24   Anuel Schwartz MD   Magnesium 400 MG TABS Take 1 tablet by mouth daily 6/18/24   Reported, Patient   metoprolol succinate ER (TOPROL XL) 25 MG 24 hr tablet Take 0.5 tablets (12.5 mg) by mouth daily. 9/3/24   Valentino Lu MD   multivitamin (CENTRUM SILVER) tablet Take 1 tablet by mouth daily    Reported, Patient   nitroGLYcerin (NITROSTAT) 0.4 MG sublingual tablet For chest pain place 1 tablet under the tongue every 5 minutes for 3 doses. If symptoms persist 5 minutes after 1st dose call 911. 7/22/24   Valentino Lu MD   oxyCODONE (ROXICODONE) 5 MG tablet Take 1-2 tablets (5-10 mg) by mouth nightly as needed for severe pain 5/1/24   Deanna Lisa MD   predniSONE (DELTASONE) 10 MG tablet Take 6 tablets (60 mg) by mouth daily for 7 days, THEN 5 tablets (50 mg) daily for 7 days, THEN 4 tablets (40 mg) daily for 7 days, THEN 3.5 tablets (35 mg) daily for 7 days, THEN 3 tablets (30 mg) daily for 7 days, THEN 2.5 tablets (25 mg) daily for 7 days, THEN 2 tablets (20 mg) daily for 7 days, THEN 1.5 tablets (15 mg) daily for 7 days. 12/23/24 2/17/25  Valdez Woodson MD   sarilumab (KEVZARA) 200 MG/1.14ML injection Inject 1.14 mLs (200 mg) subcutaneously every 14 days. 11/6/24   Valdez Woodson MD   sildenafil (VIAGRA) 50 MG tablet Take 1 tablet (50 mg) by mouth daily as needed (ED) 9/13/23   Valentino Lu MD          Clinically Significant Risk Factors Present on Admission   Clinically  "Significant Risk Factors Present on Admission                   # Drug Induced Platelet Defect: home medication list includes an antiplatelet medication       # Hypertension: Home medication list includes antihypertensive(s)             # Overweight: Estimated body mass index is 25.83 kg/m  as calculated from the following:    Height as of this encounter: 1.778 m (5' 10\").    Weight as of this encounter: 81.6 kg (180 lb).             Native vessel CAD                     "

## 2024-12-31 NOTE — H&P
[facility]    History and Physical - Hospitalist Service       Date of Admission:  12/31/2024    Assessment & Plan      Minor Murcia is a 64 year old male with history of coronary artery disease status post PTCA to RCA and LAD in 2005, with repeat PCI and stenting of mid LAD in April 2017 for in-stent restenosis.  Patient also with history of hypertension, dyslipidemia, polymyalgia rheumatica diagnosed in May 2024 and recent diagnosis of giant cell arteritis 3 weeks ago admitted on 12/31/2024 for chest pressure while working on 12/31/2024.     Chest pain  Coronary artery disease  Sinus bradycardia, PVCs  History of PCI stents to LAD and RCA last stenting 2017  Initial troponin and EKG unremarkable.  CXR negative.   Serial troponins negative.  Order echocardiogram stat.  If echocardiogram unremarkable recommend stress testing  Resume home medications as below.    Essential hypertension  PTA medication resume home medicines.    Dyslipidemia  PTA medication: Continue atorvastatin 80 mg daily, ezetimibe 10 mg daily    Polymyalgia rheumatica  Giant cell arteritis  Normocytic anemia  Follows with rheumatology. Dr. Valdez [facility]son with [facility]  Hemoglobin stable at 12.7 g/dL. WBC elevated at 14.4 possibly secondary to prednisone use.   PTA medication: Continue prednisone and atovaquone     Observation Goals: List all goals to be met before discharge home: , - Serial troponins and stress test complete., - Seen and cleared by consultant if applicable, - Adequate pain control on oral analgesia, - Vital signs normal or at patient baseline, - Safe disposition plan has been identified, - Nurse to notify provider when observation goals have been met and patient is ready for discharge.  Diet: Combination Diet Low Saturated Fat Na <2400mg Diet, No Caffeine Diet    DVT Prophylaxis: PCD  Potts Catheter: Not present  Lines: None     Cardiac Monitoring: ACTIVE order. Indication: Chest pain/ ACS rule out (24  "hours)  Code Status: Full Code    Clinically Significant Risk Factors Present on Admission                 # Drug Induced Platelet Defect: home medication list includes an antiplatelet medication   # Hypertension: Home medication list includes antihypertensive(s)           # Overweight: Estimated body mass index is 25.83 kg/m  as calculated from the following:    Height as of this encounter: 1.778 m (5' 10\").    Weight as of this encounter: 81.6 kg (180 lb).            Disposition Plan     Medically Ready for Discharge: Anticipated Today           Kamran Santana DO  Hospitalist Service  [facility]  Securely message with Anita Margarita (more info)  Text page via [facility] Paging/Directory     ______________________________________________________________________    Chief Complaint   Chest pressure, chin and lip tingling.    History is obtained from the patient    History of Present Illness   Minor Murcia is a 64 year old male with history of coronary artery disease, hypercholesterolemia, hypertension, giant cell arteritis and polymyalgia rheumatica who presents with acute onset upper chest tightness and pressure which resolved after 20 minutes.  He had associated symptoms of pain radiating to his shoulders and tingling in chin and lower lip.  Patient denies any chest pain at this time.  No recent sick contacts.  No orthopnea or PND.  Denies palpitations.  Patient's blood pressure was elevated when it was checked at work.      Past Medical History    Past Medical History:   Diagnosis Date    Coronary artery disease     High cholesterol     Hypertension     Myocardial infarction (H) 2005       Past Surgical History   Past Surgical History:   Procedure Laterality Date    CARDIAC CATHETERIZATION      CORONARY STENT PLACEMENT  2005    [place] CATH PLACEMENT & NJX CORONARY ART ANGIO IMG S&I N/A 4/14/2017    Procedure: Coronary Angiogram;  Surgeon: Roverto Hollis MD;  Location: [facility] Cath Lab;  Service: " Cardiology    [place] CATH PLMT L HRT & ARTS W/NJX & ANGIO IMG S&I Left 4/14/2017    Procedure: Left Heart Catheterization Without Left Ventriculogram;  Surgeon: Roverto Hollis MD;  Location: [facility] Cath Lab;  Service: Cardiology       Prior to Admission Medications   Prior to Admission Medications   Prescriptions Last Dose Informant Patient Reported? Taking?   Ibuprofen (ADVIL PO)  Self Yes No   Sig: Take 600 mg by mouth 3 times daily as needed for moderate pain   Magnesium 400 MG TABS   Yes No   Sig: Take 1 tablet by mouth daily   acetaminophen (TYLENOL) 160 MG TBDP  Self Yes No   Sig: Take 500 mg by mouth 2 times daily as needed for mild pain   alendronate (FOSAMAX) 70 MG tablet   No No   Sig: Take 1 tablet (70 mg) by mouth every 7 days.   amLODIPine (NORVASC) 5 MG tablet   No No   Sig: Take 1 tablet (5 mg) by mouth daily   aspirin 81 MG EC tablet  Self Yes No   Sig: [ASPIRIN 81 MG EC TABLET] Take 81 mg by mouth daily.   atorvastatin (LIPITOR) 80 MG tablet   No No   Sig: Take 1 tablet (80 mg) by mouth daily   atovaquone (MEPRON) 750 MG/5ML suspension   No No   Sig: Take 10 mLs (1,500 mg) by mouth daily.   calcium carbonate-vitamin D (CALTRATE) 600-10 MG-MCG per tablet   Yes No   Sig: Take 1 tablet by mouth daily.   ezetimibe (ZETIA) 10 MG tablet   No No   Sig: Take 1 tablet (10 mg) by mouth daily.   famotidine (PEPCID) 40 MG tablet   No No   Sig: Take 1 tablet (40 mg) by mouth daily.   lisinopril (ZESTRIL) 20 MG tablet   No No   Sig: Take 1 tablet (20 mg) by mouth daily   metoprolol succinate ER (TOPROL XL) 25 MG 24 hr tablet   No No   Sig: Take 0.5 tablets (12.5 mg) by mouth daily.   multivitamin (CENTRUM SILVER) tablet  Self Yes No   Sig: Take 1 tablet by mouth daily   nitroGLYcerin (NITROSTAT) 0.4 MG sublingual tablet   No No   Sig: For chest pain place 1 tablet under the tongue every 5 minutes for 3 doses. If symptoms persist 5 minutes after 1st dose call 911.   oxyCODONE (ROXICODONE) 5 MG tablet  Self No No  "  Sig: Take 1-2 tablets (5-10 mg) by mouth nightly as needed for severe pain   predniSONE (DELTASONE) 10 MG tablet   No No   Sig: Take 6 tablets (60 mg) by mouth daily for 7 days, THEN 5 tablets (50 mg) daily for 7 days, THEN 4 tablets (40 mg) daily for 7 days, THEN 3.5 tablets (35 mg) daily for 7 days, THEN 3 tablets (30 mg) daily for 7 days, THEN 2.5 tablets (25 mg) daily for 7 days, THEN 2 tablets (20 mg) daily for 7 days, THEN 1.5 tablets (15 mg) daily for 7 days.   sarilumab (KEVZARA) 200 MG/1.14ML injection   No No   Sig: Inject 1.14 mLs (200 mg) subcutaneously every 14 days.   sildenafil (VIAGRA) 50 MG tablet  Self No No   Sig: Take 1 tablet (50 mg) by mouth daily as needed (ED)      Facility-Administered Medications: None        Review of Systems    The 10 point Review of Systems is negative other than noted in the HPI or here.     Social History   I have reviewed this patient's social history and updated it with pertinent information if needed.  Social History     Tobacco Use    Smoking status: Never    Smokeless tobacco: Never   Vaping Use    Vaping status: Never Used   Substance Use Topics    Alcohol use: Not Currently    Drug use: Yes     Frequency: 0.2 times per week     Types: Marijuana     Comment: \"Once per month\"         Family History   I have reviewed this patient's family history and updated it with pertinent information if needed.  Family History   Problem Relation Age of Onset    Cancer Mother     Coronary Artery Disease Father    Father  at age of 71 from MI.      Allergies   No Known Allergies     Physical Exam   Vital Signs: Temp: 97.8  F (36.6  C) Temp src: Temporal BP: 135/73 Pulse: (!) 49   Resp: 16 SpO2: 98 %      Weight: 180 lbs 0 oz    General Appearance: No apparent distress.  Well-nourished  Eyes: EOMI x 2, PERRLA.  HEENT: Normocephalic atraumatic, neck supple, trachea midline.  No JVD.  Respiratory: Clear to auscultation bilaterally no wheeze rales rhonchi  Cardiovascular: " Bradycardia regular.  No murmur bruit rub or gallop  GI: Nondistended, normoactive bowel sounds, abdomen is soft to palpation nontender.  Genitourinary: Deferred  Skin: Warm and dry  Musculoskeletal: Moving all extremities spontaneously and purposefully.  No focal muscle weakness.  Neurologic: Awake alert oriented x 3.  Cranial nerves II through XII intact.  General sensation intact.  Psychiatric: Normal mood and affect    Medical Decision Making       80 MINUTES SPENT BY ME on the date of service doing chart review, history, exam, documentation & further activities per the note.      Data     I have personally reviewed the following data over the past 24 hrs:    14.4 (H)  \   12.7 (L)   / 256     139 106 29.2 (H) /  97   4.3 23 1.03 \     Trop: 16 BNP: N/A       Imaging results reviewed over the past 24 hrs:   Recent Results (from the past 24 hours)   XR Chest 2 Views    Narrative    EXAM: XR CHEST 2 VIEWS  LOCATION: [facility]  DATE: 12/31/2024    INDICATION: chest pain  COMPARISON: 5/23/2024      Impression    IMPRESSION: Negative chest.

## 2024-12-31 NOTE — PROGRESS NOTES
"PRIMARY DIAGNOSIS: \"GENERIC\" NURSING  OUTPATIENT/OBSERVATION GOALS TO BE MET BEFORE DISCHARGE:  ADLs back to baseline: Yes    Activity and level of assistance: Up with standby assistance.    Pain status: Pain free. Denies chest pain since admission.     Return to near baseline physical activity: Yes     Discharge Planner Nurse   Safe discharge environment identified: Yes  Barriers to discharge: Yes - pending medical and cardiology clearance.        Entered by: Maura Orr RN 12/31/2024 12:55 PM    A/O x 4, VSS on RA. Denies pain. L & R PIV SL; flushed. Echo being done currently.   SBA w/ ambulation. Call light within reach, calls appropriately.   "

## 2024-12-31 NOTE — ED PROVIDER NOTES
EMERGENCY DEPARTMENT ENCOUNTER      NAME: Minor Murcia  AGE: 64 year old male  YOB: 1960  MRN: [id number]  EVALUATION DATE & TIME: No admission date for patient encounter.    PCP: Valentino Lu    ED PROVIDER: Nelda Chen MD      Chief Complaint   Patient presents with    Chest Pain         FINAL IMPRESSION:  1. Chest pain, unspecified type          ED COURSE & MEDICAL DECISION MAKING:    Pertinent Labs & Imaging studies reviewed. (See chart for details)  64 year old male presents to the Emergency Department for evaluation of left-sided chest pain with diaphoresis.  Patient had similar symptoms previous to a heart attack which required cardiac stents.  Patient's symptoms have since resolved.  ECG with sinus bradycardia, no acute ischemia.  Given the patient's symptoms and history, will require admission for ACS rule out.    Troponin is 19.  Leukocytosis with no concern for acute infection.  Negative chest x-ray.  Patient will be admitted for ACS rule out.    At the conclusion of the encounter I discussed the results of all of the tests and the disposition. The questions were answered. The patient or family acknowledged understanding and was agreeable with the care plan.     8:41 AM I met with the patient to obtain patient history and performed a physical exam. Discussed plan for ED work up including potential diagnostic studies and interventions.   10:25 AM Spoke to Dr. Santana who accepts the patient for admission.      Medical Decision Making  Obtained supplemental history:Supplemental history obtained?: No  Reviewed external records: External records reviewed?: No  Care impacted by chronic illness:Hyperlipidemia and Other: CAD and ASCVD  Did you consider but not order tests?: Work up considered but not performed and documented in chart, if applicable  Did you interpret images independently?: Independent interpretation of ECG and images noted in documentation, when  applicable.  Consultation discussion with other provider:Did you involve another provider (consultant, , pharmacy, etc.)?: I discussed the care with another health care provider, see documentation for details.  Admit.    MIPS: Not Applicable        MEDICATIONS GIVEN IN THE EMERGENCY:  Medications   aspirin EC tablet 81 mg (has no administration in time range)   nitroGLYcerin (NITROSTAT) sublingual tablet 0.4 mg (has no administration in time range)   alum & mag hydroxide-simethicone (MAALOX) suspension 30 mL (has no administration in time range)   acetaminophen (TYLENOL) tablet 650 mg (has no administration in time range)     Or   acetaminophen (TYLENOL) Suppository 650 mg (has no administration in time range)   aspirin (ASA) chewable tablet 162 mg (162 mg Oral $Given 12/31/24 0895)       NEW PRESCRIPTIONS STARTED AT TODAY'S ER VISIT  New Prescriptions    No medications on file          =================================================================    HPI    Patient information was obtained from: patient    Use of : N/A         Minor Murcia is a 64 year old male with a pertinent history of CAD, hyperlipidemia and arteriosclerotic cardiovascular disease who presents to this ED by EMS for evaluation of Chest pain.     Patient states onset of chest heaviness today at work. He describes the chest pain as someone sitting on him. The chest pain is on both sides. The pain lasted about 20 minutes. Patient has a history of 3 stents in the past with his most recent being 5-6 years ago. He has been pain free since then he reports. Patient states tingling in his jaw that has now subsided. Patient reports he had a myocardial infraction at 47 and had 2 stents placed. Patient denies pain right now in ED. Patient denies shortness of breath. There were no other complaints/concerns at this time.       PAST MEDICAL HISTORY:  Past Medical History:   Diagnosis Date    Coronary artery disease     High cholesterol      "Hypertension     Myocardial infarction (H) 2005       PAST SURGICAL HISTORY:  Past Surgical History:   Procedure Laterality Date    CARDIAC CATHETERIZATION      CORONARY STENT PLACEMENT  2005    NV CATH PLACEMENT & NJX CORONARY ART ANGIO IMG S&I N/A 4/14/2017    Procedure: Coronary Angiogram;  Surgeon: Roverto Hollis MD;  Location: [facility] Cath Lab;  Service: Cardiology    NV CATH PLMT L HRT & ARTS W/NJX & ANGIO IMG S&I Left 4/14/2017    Procedure: Left Heart Catheterization Without Left Ventriculogram;  Surgeon: Roverto Hollis MD;  Location: [facility] Cath Lab;  Service: Cardiology           CURRENT MEDICATIONS:    acetaminophen (TYLENOL) 160 MG TBDP  amLODIPine (NORVASC) 5 MG tablet  aspirin 81 MG EC tablet  atorvastatin (LIPITOR) 80 MG tablet  atovaquone (MEPRON) 750 MG/5ML suspension  calcium carbonate-vitamin D (CALTRATE) 600-10 MG-MCG per tablet  ezetimibe (ZETIA) 10 MG tablet  famotidine (PEPCID) 40 MG tablet  Ibuprofen (ADVIL PO)  lisinopril (ZESTRIL) 20 MG tablet  Magnesium 400 MG TABS  metoprolol succinate ER (TOPROL XL) 25 MG 24 hr tablet  multivitamin (CENTRUM SILVER) tablet  nitroGLYcerin (NITROSTAT) 0.4 MG sublingual tablet  oxyCODONE (ROXICODONE) 5 MG tablet  predniSONE (DELTASONE) 10 MG tablet  sarilumab (KEVZARA) 200 MG/1.14ML injection  sildenafil (VIAGRA) 50 MG tablet  alendronate (FOSAMAX) 70 MG tablet        ALLERGIES:  No Known Allergies    FAMILY HISTORY:  Family History   Problem Relation Age of Onset    Cancer Mother     Coronary Artery Disease Father        SOCIAL HISTORY:   Social History     Socioeconomic History    Marital status:    Tobacco Use    Smoking status: Never    Smokeless tobacco: Never   Vaping Use    Vaping status: Never Used   Substance and Sexual Activity    Alcohol use: Not Currently    Drug use: Yes     Frequency: 0.2 times per week     Types: Marijuana     Comment: \"Once per month\"     Social Drivers of Health     Interpersonal Safety: Low Risk  " "(5/1/2024)    Interpersonal Safety     Do you feel physically and emotionally safe where you currently live?: Yes     Within the past 12 months, have you been hit, slapped, kicked or otherwise physically hurt by someone?: No     Within the past 12 months, have you been humiliated or emotionally abused in other ways by your partner or ex-partner?: No       VITALS:  /84   Pulse 55   Temp 97.8  F (36.6  C) (Temporal)   Resp 22   Ht 1.778 m (5' 10\")   Wt 81.6 kg (180 lb)   SpO2 98%   BMI 25.83 kg/m      PHYSICAL EXAM    Constitutional: Well developed, Well nourished, NAD  HENT: Normocephalic, Atraumatic, Bilateral external ears normal, Oropharynx normal, mucous membranes moist, Nose normal.   Neck- Normal range of motion, No tenderness, Supple, No stridor.  Eyes: PERRL, EOMI, Conjunctiva normal, No discharge.   Respiratory: Normal breath sounds, No respiratory distress  Cardiovascular: Normal heart rate, Regular rhythm  GI: Bowel sounds normal, Soft, No tenderness,   Musculoskeletal: No edema. Good range of motion in all major joints. No tenderness to palpation or major deformities noted.   Integument: Warm, Dry, No erythema, No rash  Neurologic: Alert & oriented x 3, Normal motor function, Normal sensory function, No focal deficits noted. Normal gait.   Psychiatric: Affect normal, Judgment normal, Mood normal.      LAB:  All pertinent labs reviewed and interpreted.  Results for orders placed or performed during the hospital encounter of 12/31/24   XR Chest 2 Views    Impression    IMPRESSION: Negative chest.   Extra Blue Top Tube   Result Value Ref Range    Hold Specimen [facility]    Basic metabolic panel   Result Value Ref Range    Sodium 139 135 - 145 mmol/L    Potassium 4.3 3.4 - 5.3 mmol/L    Chloride 106 98 - 107 mmol/L    Carbon Dioxide (CO2) 23 22 - 29 mmol/L    Anion Gap 10 7 - 15 mmol/L    Urea Nitrogen 29.2 (H) 8.0 - 23.0 mg/dL    Creatinine 1.03 0.67 - 1.17 mg/dL    GFR Estimate 81 >60 mL/min/1.73m2 "    Calcium 8.4 (L) 8.8 - 10.4 mg/dL    Glucose 97 70 - 99 mg/dL   Result Value Ref Range    Troponin T, High Sensitivity 19 <=22 ng/L   CBC with platelets and differential   Result Value Ref Range    WBC Count 14.4 (H) 4.0 - 11.0 10e3/uL    RBC Count 4.23 (L) 4.40 - 5.90 10e6/uL    Hemoglobin 12.7 (L) 13.3 - 17.7 g/dL    Hematocrit 38.4 (L) 40.0 - 53.0 %    MCV 91 78 - 100 fL    MCH 30.0 26.5 - 33.0 pg    MCHC 33.1 31.5 - 36.5 g/dL    RDW 12.9 10.0 - 15.0 %    Platelet Count 256 150 - 450 10e3/uL    % Neutrophils 78 %    % Lymphocytes 12 %    % Monocytes 9 %    % Eosinophils 1 %    % Basophils 0 %    % Immature Granulocytes 1 %    NRBCs per 100 WBC 0 <1 /100    Absolute Neutrophils 11.3 (H) 1.6 - 8.3 10e3/uL    Absolute Lymphocytes 1.7 0.8 - 5.3 10e3/uL    Absolute Monocytes 1.3 0.0 - 1.3 10e3/uL    Absolute Eosinophils 0.1 0.0 - 0.7 10e3/uL    Absolute Basophils 0.0 0.0 - 0.2 10e3/uL    Absolute Immature Granulocytes 0.1 <=0.4 10e3/uL    Absolute NRBCs 0.0 10e3/uL   Result Value Ref Range    Troponin T, High Sensitivity 16 <=22 ng/L   ECG 12-LEAD WITH MUSE (LHE)   Result Value Ref Range    Systolic Blood Pressure  mmHg    Diastolic Blood Pressure  mmHg    Ventricular Rate 53 BPM    Atrial Rate 53 BPM    TN Interval 178 ms    QRS Duration 114 ms     ms    QTc 401 ms    P Axis 64 degrees    R AXIS 45 degrees    T Axis 42 degrees    Interpretation ECG       Sinus bradycardia  Otherwise normal ECG  When compared with ECG of 23-May-2024 20:43,  Premature atrial complexes are no longer Present  Vent. rate has decreased by  26 bpm  Confirmed by SEE ED PROVIDER NOTE FOR, ECG INTERPRETATION ([id number]),  Chyna Sanchez ([id number]) on 12/31/2024 9:04:42 AM         RADIOLOGY:  Reviewed all pertinent imaging. Please see official radiology report.  Echocardiogram Complete   Final Result      XR Chest 2 Views   Final Result   IMPRESSION: Negative chest.      Echocardiogram Exercise Stress    (Results Pending)       EKG:     Performed at: 12/31/2024    Impression: Sinus bradycardia     Rate: 53  Rhythm: Sinus bradycardia     SD Interval: 178  QRS Interval: 114  QTc Interval: 428/401  ST Changes: no acute ischemia   Comparison: When compared to 5/23/2024  premature atrial complexes are no longer present    I have independently reviewed and interpreted the EKG(s) documented above.      I, Dash Espinosa, am serving as a scribe to document services personally performed by Nelda Chen MD, based on my observation and the provider's statements to me. I, Nelda Chen MD, attest that Dash Espinosa is acting in a scribe capacity, has observed my performance of the services and has documented them in accordance with my direction.    Nelda Chen MD  Emergency Medicine  [facility] EMERGENCY ROOM  [facility] [address]  [phone]       Nelda Chen MD  12/31/24 2951

## 2024-12-31 NOTE — PHARMACY-ADMISSION MEDICATION HISTORY
Pharmacist Admission Medication History    Admission medication history is complete. The information provided in this note is only as accurate as the sources available at the time of the update.    Medication reconciliation/reorder completed by provider prior to medication history? No    Information Source(s): Patient and CareEverywhere/SureScripts via in-person    Pertinent Information:     Changes made to PTA medication list:  Added: none  Deleted: none  Changed: none      Allergies reviewed with patient and updates made in EHR: yes    Medications available for use during hospital stay: NONE.     Medication History Completed By: Preet Chen RPH 12/31/2024 1:13 PM    PTA Med List   Medication Sig Note Last Dose/Taking    acetaminophen (TYLENOL) 160 MG TBDP Take 500 mg by mouth 2 times daily as needed for mild pain  12/24/2024    amLODIPine (NORVASC) 5 MG tablet Take 1 tablet (5 mg) by mouth daily  12/31/2024 Morning    aspirin 81 MG EC tablet [ASPIRIN 81 MG EC TABLET] Take 81 mg by mouth daily.  12/31/2024 Morning    atorvastatin (LIPITOR) 80 MG tablet Take 1 tablet (80 mg) by mouth daily  12/31/2024 Morning    atovaquone (MEPRON) 750 MG/5ML suspension Take 10 mLs (1,500 mg) by mouth daily.  12/31/2024 Morning    calcium carbonate-vitamin D (CALTRATE) 600-10 MG-MCG per tablet Take 1 tablet by mouth every evening.  12/30/2024 Evening    ezetimibe (ZETIA) 10 MG tablet Take 1 tablet (10 mg) by mouth daily.  12/31/2024 Morning    famotidine (PEPCID) 40 MG tablet Take 1 tablet (40 mg) by mouth daily.  12/31/2024 Morning    Ibuprofen (ADVIL PO) Take 600 mg by mouth 3 times daily as needed for moderate pain  12/24/2024    lisinopril (ZESTRIL) 20 MG tablet Take 1 tablet (20 mg) by mouth daily  12/31/2024 Morning    Magnesium 400 MG TABS Take 1 tablet by mouth daily  12/31/2024 Morning    metoprolol succinate ER (TOPROL XL) 25 MG 24 hr tablet Take 0.5 tablets (12.5 mg) by mouth daily.  12/31/2024 Morning     multivitamin (CENTRUM SILVER) tablet Take 1 tablet by mouth daily  12/31/2024 Morning    nitroGLYcerin (NITROSTAT) 0.4 MG sublingual tablet For chest pain place 1 tablet under the tongue every 5 minutes for 3 doses. If symptoms persist 5 minutes after 1st dose call 911.  More than a month    oxyCODONE (ROXICODONE) 5 MG tablet Take 1-2 tablets (5-10 mg) by mouth nightly as needed for severe pain  More than a month    predniSONE (DELTASONE) 10 MG tablet Take 6 tablets (60 mg) by mouth daily for 7 days, THEN 5 tablets (50 mg) daily for 7 days, THEN 4 tablets (40 mg) daily for 7 days, THEN 3.5 tablets (35 mg) daily for 7 days, THEN 3 tablets (30 mg) daily for 7 days, THEN 2.5 tablets (25 mg) daily for 7 days, THEN 2 tablets (20 mg) daily for 7 days, THEN 1.5 tablets (15 mg) daily for 7 days. 12/31/2024: 12/31/2024 Day 2 of 50mg  12/31/2024    sarilumab (KEVZARA) 200 MG/1.14ML injection Inject 1.14 mLs (200 mg) subcutaneously every 14 days. 12/31/2024: Not started yet Taking    sildenafil (VIAGRA) 50 MG tablet Take 1 tablet (50 mg) by mouth daily as needed (ED)  Taking As Needed

## 2025-01-02 RX ORDER — AMLODIPINE BESYLATE 5 MG/1
5 TABLET ORAL DAILY
Qty: 90 TABLET | Refills: 0 | Status: SHIPPED | OUTPATIENT
Start: 2025-01-02

## 2025-01-20 ENCOUNTER — INFUSION THERAPY VISIT (OUTPATIENT)
Dept: INFUSION THERAPY | Facility: HOSPITAL | Age: 65
End: 2025-01-20
Attending: STUDENT IN AN ORGANIZED HEALTH CARE EDUCATION/TRAINING PROGRAM
Payer: COMMERCIAL

## 2025-01-20 VITALS
RESPIRATION RATE: 18 BRPM | OXYGEN SATURATION: 98 % | TEMPERATURE: 97.8 F | WEIGHT: 189 LBS | DIASTOLIC BLOOD PRESSURE: 67 MMHG | HEART RATE: 59 BPM | SYSTOLIC BLOOD PRESSURE: 112 MMHG | BODY MASS INDEX: 27.12 KG/M2

## 2025-01-20 DIAGNOSIS — M31.6 GCA (GIANT CELL ARTERITIS) (H): Primary | ICD-10-CM

## 2025-01-20 LAB
ALBUMIN SERPL BCG-MCNC: 3.9 G/DL (ref 3.5–5.2)
ALBUMIN UR-MCNC: NEGATIVE MG/DL
ALP SERPL-CCNC: 51 U/L (ref 40–150)
ALT SERPL W P-5'-P-CCNC: 23 U/L (ref 0–70)
ANION GAP SERPL CALCULATED.3IONS-SCNC: 10 MMOL/L (ref 7–15)
APPEARANCE UR: CLEAR
AST SERPL W P-5'-P-CCNC: 18 U/L (ref 0–45)
BASOPHILS # BLD AUTO: 0 10E3/UL (ref 0–0.2)
BASOPHILS NFR BLD AUTO: 0 %
BILIRUB SERPL-MCNC: 0.7 MG/DL
BILIRUB UR QL STRIP: NEGATIVE
BUN SERPL-MCNC: 25 MG/DL (ref 8–23)
CALCIUM SERPL-MCNC: 8.3 MG/DL (ref 8.8–10.4)
CHLORIDE SERPL-SCNC: 106 MMOL/L (ref 98–107)
CHOLEST SERPL-MCNC: 166 MG/DL
COLOR UR AUTO: NORMAL
CREAT SERPL-MCNC: 0.94 MG/DL (ref 0.67–1.17)
EGFRCR SERPLBLD CKD-EPI 2021: 89 ML/MIN/1.73M2
EOSINOPHIL # BLD AUTO: 0.1 10E3/UL (ref 0–0.7)
EOSINOPHIL NFR BLD AUTO: 1 %
ERYTHROCYTE [DISTWIDTH] IN BLOOD BY AUTOMATED COUNT: 13.9 % (ref 10–15)
FASTING STATUS PATIENT QL REPORTED: NO
GLUCOSE SERPL-MCNC: 145 MG/DL (ref 70–99)
GLUCOSE UR STRIP-MCNC: NEGATIVE MG/DL
HCO3 SERPL-SCNC: 22 MMOL/L (ref 22–29)
HCT VFR BLD AUTO: 40.9 % (ref 40–53)
HDLC SERPL-MCNC: 70 MG/DL
HGB BLD-MCNC: 13.7 G/DL (ref 13.3–17.7)
HGB UR QL STRIP: NEGATIVE
IMM GRANULOCYTES # BLD: 0.1 10E3/UL
IMM GRANULOCYTES NFR BLD: 1 %
KETONES UR STRIP-MCNC: NEGATIVE MG/DL
LDLC SERPL CALC-MCNC: 80 MG/DL
LEUKOCYTE ESTERASE UR QL STRIP: NEGATIVE
LYMPHOCYTES # BLD AUTO: 1.4 10E3/UL (ref 0.8–5.3)
LYMPHOCYTES NFR BLD AUTO: 15 %
MCH RBC QN AUTO: 30.9 PG (ref 26.5–33)
MCHC RBC AUTO-ENTMCNC: 33.5 G/DL (ref 31.5–36.5)
MCV RBC AUTO: 92 FL (ref 78–100)
MONOCYTES # BLD AUTO: 0.6 10E3/UL (ref 0–1.3)
MONOCYTES NFR BLD AUTO: 6 %
NEUTROPHILS # BLD AUTO: 7.3 10E3/UL (ref 1.6–8.3)
NEUTROPHILS NFR BLD AUTO: 78 %
NITRATE UR QL: NEGATIVE
NONHDLC SERPL-MCNC: 96 MG/DL
NRBC # BLD AUTO: 0 10E3/UL
NRBC BLD AUTO-RTO: 0 /100
PH UR STRIP: 6 [PH] (ref 5–7)
PLATELET # BLD AUTO: 209 10E3/UL (ref 150–450)
POTASSIUM SERPL-SCNC: 4.3 MMOL/L (ref 3.4–5.3)
PROT SERPL-MCNC: 5.9 G/DL (ref 6.4–8.3)
RBC # BLD AUTO: 4.43 10E6/UL (ref 4.4–5.9)
RBC URINE: <1 /HPF
SODIUM SERPL-SCNC: 138 MMOL/L (ref 135–145)
SP GR UR STRIP: 1.01 (ref 1–1.03)
TRIGL SERPL-MCNC: 82 MG/DL
UROBILINOGEN UR STRIP-MCNC: <2 MG/DL
WBC # BLD AUTO: 9.5 10E3/UL (ref 4–11)
WBC URINE: <1 /HPF

## 2025-01-20 PROCEDURE — 96375 TX/PRO/DX INJ NEW DRUG ADDON: CPT

## 2025-01-20 PROCEDURE — 80061 LIPID PANEL: CPT | Performed by: STUDENT IN AN ORGANIZED HEALTH CARE EDUCATION/TRAINING PROGRAM

## 2025-01-20 PROCEDURE — 250N000013 HC RX MED GY IP 250 OP 250 PS 637: Performed by: STUDENT IN AN ORGANIZED HEALTH CARE EDUCATION/TRAINING PROGRAM

## 2025-01-20 PROCEDURE — 85004 AUTOMATED DIFF WBC COUNT: CPT | Performed by: STUDENT IN AN ORGANIZED HEALTH CARE EDUCATION/TRAINING PROGRAM

## 2025-01-20 PROCEDURE — 36415 COLL VENOUS BLD VENIPUNCTURE: CPT | Performed by: STUDENT IN AN ORGANIZED HEALTH CARE EDUCATION/TRAINING PROGRAM

## 2025-01-20 PROCEDURE — 84155 ASSAY OF PROTEIN SERUM: CPT | Performed by: STUDENT IN AN ORGANIZED HEALTH CARE EDUCATION/TRAINING PROGRAM

## 2025-01-20 PROCEDURE — 81001 URINALYSIS AUTO W/SCOPE: CPT | Performed by: STUDENT IN AN ORGANIZED HEALTH CARE EDUCATION/TRAINING PROGRAM

## 2025-01-20 PROCEDURE — 258N000003 HC RX IP 258 OP 636: Performed by: STUDENT IN AN ORGANIZED HEALTH CARE EDUCATION/TRAINING PROGRAM

## 2025-01-20 PROCEDURE — 96365 THER/PROPH/DIAG IV INF INIT: CPT

## 2025-01-20 PROCEDURE — 250N000011 HC RX IP 250 OP 636: Performed by: STUDENT IN AN ORGANIZED HEALTH CARE EDUCATION/TRAINING PROGRAM

## 2025-01-20 PROCEDURE — 85041 AUTOMATED RBC COUNT: CPT | Performed by: STUDENT IN AN ORGANIZED HEALTH CARE EDUCATION/TRAINING PROGRAM

## 2025-01-20 PROCEDURE — 82565 ASSAY OF CREATININE: CPT | Performed by: STUDENT IN AN ORGANIZED HEALTH CARE EDUCATION/TRAINING PROGRAM

## 2025-01-20 RX ORDER — METHYLPREDNISOLONE SODIUM SUCCINATE 125 MG/2ML
125 INJECTION INTRAMUSCULAR; INTRAVENOUS ONCE
OUTPATIENT
Start: 2025-02-17

## 2025-01-20 RX ORDER — ACETAMINOPHEN 325 MG/1
650 TABLET ORAL ONCE
Status: COMPLETED | OUTPATIENT
Start: 2025-01-20 | End: 2025-01-20

## 2025-01-20 RX ORDER — ALBUTEROL SULFATE 90 UG/1
1-2 INHALANT RESPIRATORY (INHALATION)
Start: 2025-02-17

## 2025-01-20 RX ORDER — ALBUTEROL SULFATE 0.83 MG/ML
2.5 SOLUTION RESPIRATORY (INHALATION)
OUTPATIENT
Start: 2025-02-17

## 2025-01-20 RX ORDER — METHYLPREDNISOLONE SODIUM SUCCINATE 40 MG/ML
40 INJECTION INTRAMUSCULAR; INTRAVENOUS
Status: DISCONTINUED | OUTPATIENT
Start: 2025-01-20 | End: 2025-01-20 | Stop reason: HOSPADM

## 2025-01-20 RX ORDER — HEPARIN SODIUM (PORCINE) LOCK FLUSH IV SOLN 100 UNIT/ML 100 UNIT/ML
5 SOLUTION INTRAVENOUS
OUTPATIENT
Start: 2025-02-17

## 2025-01-20 RX ORDER — DIPHENHYDRAMINE HYDROCHLORIDE 50 MG/ML
50 INJECTION INTRAMUSCULAR; INTRAVENOUS
Status: DISCONTINUED | OUTPATIENT
Start: 2025-01-20 | End: 2025-01-20 | Stop reason: HOSPADM

## 2025-01-20 RX ORDER — METHYLPREDNISOLONE SODIUM SUCCINATE 125 MG/2ML
125 INJECTION INTRAMUSCULAR; INTRAVENOUS ONCE
Status: COMPLETED | OUTPATIENT
Start: 2025-01-20 | End: 2025-01-20

## 2025-01-20 RX ORDER — ALBUTEROL SULFATE 0.83 MG/ML
2.5 SOLUTION RESPIRATORY (INHALATION)
Status: DISCONTINUED | OUTPATIENT
Start: 2025-01-20 | End: 2025-01-20 | Stop reason: HOSPADM

## 2025-01-20 RX ORDER — METHYLPREDNISOLONE SODIUM SUCCINATE 40 MG/ML
40 INJECTION INTRAMUSCULAR; INTRAVENOUS
Start: 2025-02-17

## 2025-01-20 RX ORDER — EPINEPHRINE 1 MG/ML
0.3 INJECTION, SOLUTION INTRAMUSCULAR; SUBCUTANEOUS EVERY 5 MIN PRN
Status: DISCONTINUED | OUTPATIENT
Start: 2025-01-20 | End: 2025-01-20 | Stop reason: HOSPADM

## 2025-01-20 RX ORDER — DIPHENHYDRAMINE HCL 25 MG
25 CAPSULE ORAL ONCE
OUTPATIENT
Start: 2025-02-17

## 2025-01-20 RX ORDER — DIPHENHYDRAMINE HYDROCHLORIDE 50 MG/ML
50 INJECTION INTRAMUSCULAR; INTRAVENOUS
Start: 2025-02-17

## 2025-01-20 RX ORDER — DIPHENHYDRAMINE HYDROCHLORIDE 50 MG/ML
25 INJECTION INTRAMUSCULAR; INTRAVENOUS
Start: 2025-02-17

## 2025-01-20 RX ORDER — ALBUTEROL SULFATE 90 UG/1
1-2 INHALANT RESPIRATORY (INHALATION)
Status: DISCONTINUED | OUTPATIENT
Start: 2025-01-20 | End: 2025-01-20 | Stop reason: HOSPADM

## 2025-01-20 RX ORDER — DIPHENHYDRAMINE HYDROCHLORIDE 50 MG/ML
25 INJECTION INTRAMUSCULAR; INTRAVENOUS
Status: DISCONTINUED | OUTPATIENT
Start: 2025-01-20 | End: 2025-01-20 | Stop reason: HOSPADM

## 2025-01-20 RX ORDER — ACETAMINOPHEN 325 MG/1
650 TABLET ORAL ONCE
OUTPATIENT
Start: 2025-02-17

## 2025-01-20 RX ORDER — HEPARIN SODIUM,PORCINE 10 UNIT/ML
5-20 VIAL (ML) INTRAVENOUS DAILY PRN
OUTPATIENT
Start: 2025-02-17

## 2025-01-20 RX ORDER — DIPHENHYDRAMINE HCL 25 MG
25 CAPSULE ORAL ONCE
Status: COMPLETED | OUTPATIENT
Start: 2025-01-20 | End: 2025-01-20

## 2025-01-20 RX ORDER — EPINEPHRINE 1 MG/ML
0.3 INJECTION, SOLUTION INTRAMUSCULAR; SUBCUTANEOUS EVERY 5 MIN PRN
OUTPATIENT
Start: 2025-02-17

## 2025-01-20 RX ADMIN — TOCILIZUMAB 343 MG: 20 INJECTION, SOLUTION, CONCENTRATE INTRAVENOUS at 09:11

## 2025-01-20 RX ADMIN — SODIUM CHLORIDE 250 ML: 9 INJECTION, SOLUTION INTRAVENOUS at 08:41

## 2025-01-20 RX ADMIN — ACETAMINOPHEN 650 MG: 325 TABLET ORAL at 08:37

## 2025-01-20 RX ADMIN — DIPHENHYDRAMINE HYDROCHLORIDE 25 MG: 25 CAPSULE ORAL at 08:37

## 2025-01-20 RX ADMIN — METHYLPREDNISOLONE SODIUM SUCCINATE 125 MG: 125 INJECTION, POWDER, FOR SOLUTION INTRAMUSCULAR; INTRAVENOUS at 08:43

## 2025-01-20 ASSESSMENT — PAIN SCALES - GENERAL: PAINLEVEL_OUTOF10: NO PAIN (0)

## 2025-01-20 NOTE — PROGRESS NOTES
Infusion Nursing Note:  Minor Murcia presents today for Actemra (1st).    Patient seen by provider today: No    Note: Pt arrives ambulatory to [facility]. Pt reports recent diagnosis of Giant Cell Arteritis, and has not yet seen Rheumatology to discuss Actemra; written information on possible side effects given to pt. Pt has scheduled visit with  on 2/6. Discussed process of today's visit.    VSS.    Premedications: tylenol, oral benadryl (pt preference), IV solu-medrol.    Intravenous Access:  Peripheral IV placed in right AC, and labs drawn.    Treatment Conditions:  Biological Infusion Checklist:  ~~~ NOTE: If the patient answers yes to any of the questions below, hold the infusion and contact ordering provider or on-call provider.    Have you recently had an elevated temperature, fever, chills, productive cough, coughing for 3 weeks or longer or hemoptysis,  abnormal vital signs, night sweats,  chest pain or have you noticed a decrease in your appetite, unexplained weight loss or fatigue? No  Do you have any open wounds or new incisions? No  Do you have any upcoming hospitalizations or surgeries? Does not include esophagogastroduodenoscopy, colonoscopy, endoscopic retrograde cholangiopancreatography (ERCP), endoscopic ultrasound (EUS), dental procedures or joint aspiration/steroid injections No  Do you currently have any signs of illness or infection or are you on any antibiotics? No. Pt on known antimicrobial with steroid use.  Have you had any new, sudden or worsening abdominal pain? No  Have you or anyone in your household received a live vaccination in the past 4 weeks? Please note: No live vaccines while on biologic/chemotherapy until 6 months after the last treatment. Patient can receive the flu vaccine (shot only), pneumovax and the Covid vaccine. It is optimal for the patient to get these vaccines mid cycle, but they can be given at any time as long as it is not on the day of the  infusion. No  Have you recently been diagnosed with any new nervous system diseases (ie. Multiple sclerosis, Guillain [place], seizures, neurological changes) or cancer diagnosis? Are you on any form of radiation or chemotherapy? No  Are you pregnant or breast feeding or do you have plans of pregnancy in the future? N/A  Have you been having any signs of worsening depression or suicidal ideations?  (benlysta only) N/A  Have there been any other new onset medical symptoms? No  Have you had any new blood clots? (IVIG only) N/A  +Pt's TB was negative on 6/6/24;  contacted and indicated that a new TB did not need to bee drawn/evaluated.    Post Infusion Assessment:  Patient tolerated infusion without incident. Pt was able to sleep during majority of infusion. VSS.    Site patent and intact, free from redness, edema or discomfort.  No evidence of extravasations.  Access discontinued per protocol.     Discharge Plan:   Patient discharged in stable condition accompanied by: self.  Departure Mode: Ambulatory.      Rocio Steve RN

## 2025-02-03 ENCOUNTER — LAB (OUTPATIENT)
Dept: LAB | Facility: CLINIC | Age: 65
End: 2025-02-03
Payer: COMMERCIAL

## 2025-02-03 DIAGNOSIS — M35.3 PMR (POLYMYALGIA RHEUMATICA): ICD-10-CM

## 2025-02-03 LAB
AST SERPL W P-5'-P-CCNC: 23 U/L (ref 0–45)
BASOPHILS # BLD AUTO: 0 10E3/UL (ref 0–0.2)
BASOPHILS NFR BLD AUTO: 0 %
CREAT SERPL-MCNC: 1.08 MG/DL (ref 0.67–1.17)
CRP SERPL-MCNC: <3 MG/L
EGFRCR SERPLBLD CKD-EPI 2021: 76 ML/MIN/1.73M2
EOSINOPHIL # BLD AUTO: 0.1 10E3/UL (ref 0–0.7)
EOSINOPHIL NFR BLD AUTO: 1 %
ERYTHROCYTE [DISTWIDTH] IN BLOOD BY AUTOMATED COUNT: 13.8 % (ref 10–15)
ERYTHROCYTE [SEDIMENTATION RATE] IN BLOOD BY WESTERGREN METHOD: 8 MM/HR (ref 0–20)
HCT VFR BLD AUTO: 41.4 % (ref 40–53)
HGB BLD-MCNC: 13.9 G/DL (ref 13.3–17.7)
IMM GRANULOCYTES # BLD: 0.1 10E3/UL
IMM GRANULOCYTES NFR BLD: 1 %
LYMPHOCYTES # BLD AUTO: 1.8 10E3/UL (ref 0.8–5.3)
LYMPHOCYTES NFR BLD AUTO: 18 %
MCH RBC QN AUTO: 30.5 PG (ref 26.5–33)
MCHC RBC AUTO-ENTMCNC: 33.6 G/DL (ref 31.5–36.5)
MCV RBC AUTO: 91 FL (ref 78–100)
MONOCYTES # BLD AUTO: 0.9 10E3/UL (ref 0–1.3)
MONOCYTES NFR BLD AUTO: 9 %
NEUTROPHILS # BLD AUTO: 7.2 10E3/UL (ref 1.6–8.3)
NEUTROPHILS NFR BLD AUTO: 71 %
NRBC # BLD AUTO: 0 10E3/UL
NRBC BLD AUTO-RTO: 0 /100
PLATELET # BLD AUTO: 194 10E3/UL (ref 150–450)
RBC # BLD AUTO: 4.55 10E6/UL (ref 4.4–5.9)
WBC # BLD AUTO: 10.1 10E3/UL (ref 4–11)

## 2025-02-03 PROCEDURE — 36415 COLL VENOUS BLD VENIPUNCTURE: CPT

## 2025-02-03 PROCEDURE — 85004 AUTOMATED DIFF WBC COUNT: CPT

## 2025-02-03 PROCEDURE — 85652 RBC SED RATE AUTOMATED: CPT

## 2025-02-03 PROCEDURE — 84450 TRANSFERASE (AST) (SGOT): CPT

## 2025-02-03 PROCEDURE — 85018 HEMOGLOBIN: CPT

## 2025-02-03 PROCEDURE — 85041 AUTOMATED RBC COUNT: CPT

## 2025-02-03 PROCEDURE — 86140 C-REACTIVE PROTEIN: CPT

## 2025-02-03 PROCEDURE — 82565 ASSAY OF CREATININE: CPT

## 2025-02-17 ENCOUNTER — INFUSION THERAPY VISIT (OUTPATIENT)
Dept: INFUSION THERAPY | Facility: HOSPITAL | Age: 65
End: 2025-02-17
Attending: STUDENT IN AN ORGANIZED HEALTH CARE EDUCATION/TRAINING PROGRAM
Payer: COMMERCIAL

## 2025-02-17 VITALS
BODY MASS INDEX: 27.26 KG/M2 | WEIGHT: 190 LBS | DIASTOLIC BLOOD PRESSURE: 72 MMHG | TEMPERATURE: 98.2 F | OXYGEN SATURATION: 97 % | HEART RATE: 61 BPM | RESPIRATION RATE: 18 BRPM | SYSTOLIC BLOOD PRESSURE: 115 MMHG

## 2025-02-17 DIAGNOSIS — M31.6 GCA (GIANT CELL ARTERITIS) (H): Primary | ICD-10-CM

## 2025-02-17 PROCEDURE — 250N000011 HC RX IP 250 OP 636: Mod: JZ | Performed by: STUDENT IN AN ORGANIZED HEALTH CARE EDUCATION/TRAINING PROGRAM

## 2025-02-17 PROCEDURE — 96375 TX/PRO/DX INJ NEW DRUG ADDON: CPT

## 2025-02-17 PROCEDURE — 258N000003 HC RX IP 258 OP 636: Performed by: STUDENT IN AN ORGANIZED HEALTH CARE EDUCATION/TRAINING PROGRAM

## 2025-02-17 PROCEDURE — 96365 THER/PROPH/DIAG IV INF INIT: CPT

## 2025-02-17 PROCEDURE — 250N000013 HC RX MED GY IP 250 OP 250 PS 637: Performed by: STUDENT IN AN ORGANIZED HEALTH CARE EDUCATION/TRAINING PROGRAM

## 2025-02-17 RX ORDER — HEPARIN SODIUM,PORCINE 10 UNIT/ML
5-20 VIAL (ML) INTRAVENOUS DAILY PRN
OUTPATIENT
Start: 2025-03-17

## 2025-02-17 RX ORDER — METHYLPREDNISOLONE SODIUM SUCCINATE 125 MG/2ML
125 INJECTION INTRAMUSCULAR; INTRAVENOUS ONCE
OUTPATIENT
Start: 2025-03-17

## 2025-02-17 RX ORDER — ALBUTEROL SULFATE 0.83 MG/ML
2.5 SOLUTION RESPIRATORY (INHALATION)
OUTPATIENT
Start: 2025-03-17

## 2025-02-17 RX ORDER — ACETAMINOPHEN 325 MG/1
650 TABLET ORAL ONCE
Status: COMPLETED | OUTPATIENT
Start: 2025-02-17 | End: 2025-02-17

## 2025-02-17 RX ORDER — EPINEPHRINE 1 MG/ML
0.3 INJECTION, SOLUTION INTRAMUSCULAR; SUBCUTANEOUS EVERY 5 MIN PRN
Status: DISCONTINUED | OUTPATIENT
Start: 2025-02-17 | End: 2025-02-17 | Stop reason: HOSPADM

## 2025-02-17 RX ORDER — METHYLPREDNISOLONE SODIUM SUCCINATE 40 MG/ML
40 INJECTION INTRAMUSCULAR; INTRAVENOUS
Start: 2025-03-17

## 2025-02-17 RX ORDER — METHYLPREDNISOLONE SODIUM SUCCINATE 40 MG/ML
40 INJECTION INTRAMUSCULAR; INTRAVENOUS
Status: DISCONTINUED | OUTPATIENT
Start: 2025-02-17 | End: 2025-02-17 | Stop reason: HOSPADM

## 2025-02-17 RX ORDER — DIPHENHYDRAMINE HYDROCHLORIDE 50 MG/ML
25 INJECTION INTRAMUSCULAR; INTRAVENOUS
Start: 2025-03-17

## 2025-02-17 RX ORDER — EPINEPHRINE 1 MG/ML
0.3 INJECTION, SOLUTION INTRAMUSCULAR; SUBCUTANEOUS EVERY 5 MIN PRN
OUTPATIENT
Start: 2025-03-17

## 2025-02-17 RX ORDER — ALBUTEROL SULFATE 90 UG/1
1-2 INHALANT RESPIRATORY (INHALATION)
Status: DISCONTINUED | OUTPATIENT
Start: 2025-02-17 | End: 2025-02-17 | Stop reason: HOSPADM

## 2025-02-17 RX ORDER — DIPHENHYDRAMINE HYDROCHLORIDE 50 MG/ML
50 INJECTION INTRAMUSCULAR; INTRAVENOUS
Status: DISCONTINUED | OUTPATIENT
Start: 2025-02-17 | End: 2025-02-17 | Stop reason: HOSPADM

## 2025-02-17 RX ORDER — DIPHENHYDRAMINE HYDROCHLORIDE 50 MG/ML
50 INJECTION INTRAMUSCULAR; INTRAVENOUS
Start: 2025-03-17

## 2025-02-17 RX ORDER — ACETAMINOPHEN 325 MG/1
650 TABLET ORAL ONCE
OUTPATIENT
Start: 2025-03-17

## 2025-02-17 RX ORDER — DIPHENHYDRAMINE HYDROCHLORIDE 50 MG/ML
25 INJECTION INTRAMUSCULAR; INTRAVENOUS
Status: DISCONTINUED | OUTPATIENT
Start: 2025-02-17 | End: 2025-02-17 | Stop reason: HOSPADM

## 2025-02-17 RX ORDER — ALBUTEROL SULFATE 0.83 MG/ML
2.5 SOLUTION RESPIRATORY (INHALATION)
Status: DISCONTINUED | OUTPATIENT
Start: 2025-02-17 | End: 2025-02-17 | Stop reason: HOSPADM

## 2025-02-17 RX ORDER — HEPARIN SODIUM (PORCINE) LOCK FLUSH IV SOLN 100 UNIT/ML 100 UNIT/ML
5 SOLUTION INTRAVENOUS
OUTPATIENT
Start: 2025-03-17

## 2025-02-17 RX ORDER — METHYLPREDNISOLONE SODIUM SUCCINATE 125 MG/2ML
125 INJECTION INTRAMUSCULAR; INTRAVENOUS ONCE
Status: COMPLETED | OUTPATIENT
Start: 2025-02-17 | End: 2025-02-17

## 2025-02-17 RX ORDER — ALBUTEROL SULFATE 90 UG/1
1-2 INHALANT RESPIRATORY (INHALATION)
Start: 2025-03-17

## 2025-02-17 RX ORDER — DIPHENHYDRAMINE HCL 25 MG
25 CAPSULE ORAL ONCE
Status: COMPLETED | OUTPATIENT
Start: 2025-02-17 | End: 2025-02-17

## 2025-02-17 RX ORDER — DIPHENHYDRAMINE HCL 25 MG
25 CAPSULE ORAL ONCE
OUTPATIENT
Start: 2025-03-17

## 2025-02-17 RX ADMIN — METHYLPREDNISOLONE SODIUM SUCCINATE 125 MG: 125 INJECTION, POWDER, FOR SOLUTION INTRAMUSCULAR; INTRAVENOUS at 08:09

## 2025-02-17 RX ADMIN — ACETAMINOPHEN 650 MG: 325 TABLET ORAL at 07:59

## 2025-02-17 RX ADMIN — SODIUM CHLORIDE 250 ML: 9 INJECTION, SOLUTION INTRAVENOUS at 08:08

## 2025-02-17 RX ADMIN — DIPHENHYDRAMINE HYDROCHLORIDE 25 MG: 25 CAPSULE ORAL at 07:59

## 2025-02-17 RX ADMIN — TOCILIZUMAB 517 MG: 20 INJECTION, SOLUTION, CONCENTRATE INTRAVENOUS at 08:42

## 2025-02-17 ASSESSMENT — PAIN SCALES - GENERAL: PAINLEVEL_OUTOF10: NO PAIN (0)

## 2025-02-17 NOTE — PROGRESS NOTES
"Infusion Nursing Note:  Minor Murcia presents today for Actemra  517mg (larger dose than initial infusion).    Patient seen by provider today: No. Pt saw  on 2/12.    Note: Pt arrives ambulatory to [facility]. Pt reports having tolerated initial Actemra infusion well. VSS. Discussed process of today's visit. Pt on prednisone taper; currently at 12.5mg. Pt reports joints have been \"good\". Pt reports eyesight has been worsening, not suddenly. Pt has not had recent eye exam; pt using reading glasses.  No labs due to be drawn.    Premedication: tylenol, oral benadryl, solu-medrol    Intravenous Access:  Peripheral IV placed in right AC.    Treatment Conditions:  Biological Infusion Checklist:  ~~~ NOTE: If the patient answers yes to any of the questions below, hold the infusion and contact ordering provider or on-call provider.    Have you recently had an elevated temperature, fever, chills, productive cough, coughing for 3 weeks or longer or hemoptysis,  abnormal vital signs, night sweats,  chest pain or have you noticed a decrease in your appetite, unexplained weight loss or fatigue? No  Do you have any open wounds or new incisions? No  Do you have any upcoming hospitalizations or surgeries? Does not include esophagogastroduodenoscopy, colonoscopy, endoscopic retrograde cholangiopancreatography (ERCP), endoscopic ultrasound (EUS), dental procedures or joint aspiration/steroid injections No  Do you currently have any signs of illness or infection or are you on any antibiotics? No  Have you had any new, sudden or worsening abdominal pain? No  Have you or anyone in your household received a live vaccination in the past 4 weeks? Please note: No live vaccines while on biologic/chemotherapy until 6 months after the last treatment. Patient can receive the flu vaccine (shot only), pneumovax and the Covid vaccine. It is optimal for the patient to get these vaccines mid cycle, but they can be given at any " time as long as it is not on the day of the infusion. No  Have you recently been diagnosed with any new nervous system diseases (ie. Multiple sclerosis, Guillain [place], seizures, neurological changes) or cancer diagnosis? Are you on any form of radiation or chemotherapy? No  Are you pregnant or breast feeding or do you have plans of pregnancy in the future? N/A  Have you been having any signs of worsening depression or suicidal ideations?  (benlysta only) N/A  Have there been any other new onset medical symptoms? No  Have you had any new blood clots? (IVIG only) N/A  Post Infusion Assessment:  Patient tolerated infusion without incident. Pt was able to sleep during infusion.    Site patent and intact, free from redness, edema or discomfort.  No evidence of extravasations.  Access discontinued per protocol.     Discharge Plan:   Patient discharged in stable condition accompanied by: self.  Departure Mode: Ambulatory.      Rocio Steve RN

## 2025-03-03 ENCOUNTER — OFFICE VISIT (OUTPATIENT)
Dept: CARDIOLOGY | Facility: CLINIC | Age: 65
End: 2025-03-03
Payer: COMMERCIAL

## 2025-03-03 VITALS
SYSTOLIC BLOOD PRESSURE: 114 MMHG | DIASTOLIC BLOOD PRESSURE: 64 MMHG | BODY MASS INDEX: 27.35 KG/M2 | RESPIRATION RATE: 16 BRPM | HEART RATE: 64 BPM | HEIGHT: 70 IN | WEIGHT: 191 LBS

## 2025-03-03 DIAGNOSIS — I25.10 CORONARY ARTERY DISEASE INVOLVING NATIVE CORONARY ARTERY OF NATIVE HEART WITHOUT ANGINA PECTORIS: ICD-10-CM

## 2025-03-03 DIAGNOSIS — I77.810 ASCENDING AORTA DILATION: Primary | ICD-10-CM

## 2025-03-03 DIAGNOSIS — I10 ESSENTIAL HYPERTENSION: ICD-10-CM

## 2025-03-03 DIAGNOSIS — E78.00 PURE HYPERCHOLESTEROLEMIA: ICD-10-CM

## 2025-03-03 PROCEDURE — 3074F SYST BP LT 130 MM HG: CPT | Performed by: STUDENT IN AN ORGANIZED HEALTH CARE EDUCATION/TRAINING PROGRAM

## 2025-03-03 PROCEDURE — G2211 COMPLEX E/M VISIT ADD ON: HCPCS | Performed by: STUDENT IN AN ORGANIZED HEALTH CARE EDUCATION/TRAINING PROGRAM

## 2025-03-03 PROCEDURE — 99214 OFFICE O/P EST MOD 30 MIN: CPT | Performed by: STUDENT IN AN ORGANIZED HEALTH CARE EDUCATION/TRAINING PROGRAM

## 2025-03-03 PROCEDURE — 3078F DIAST BP <80 MM HG: CPT | Performed by: STUDENT IN AN ORGANIZED HEALTH CARE EDUCATION/TRAINING PROGRAM

## 2025-03-03 NOTE — PROGRESS NOTES
[facility] HEART CARE   [address]  [place] [address]   [email or web address]   OFFICE: [phone]     CARDIOLOGY CLINIC NOTE     Thank you, Valentino Hernandez, for asking the [facility] Heart Care team to see Mr. Minor Murcia to evaluate Follow Up        History of Present Illness   Mr. Minor Murcia is a 65 year old male with a significant past history of CAD s/p PCI to distal RCA and mid LAD (2005) c/b ISR s/p PCI of prox/mid LAD (2017), HTN, HLD, PMR, recent diagnosis of giant cell arteritis with aorta, mPA, celiac artery involvement,  who presents for follow up.  The patient notes he feels pretty good.  He denies any active symptoms.  He was admitted to the hospital in December with chest pain and workup there was negative.  He denies any more chest pain episodes.  He did have a PET/CT in December which showed findings consistent with GCA and ascending aorta measuring 4.4 cm.  BP has been well controlled.           Medications  Allergies   Current Outpatient Medications   Medication Sig Dispense Refill    acetaminophen (TYLENOL) 160 MG TBDP Take 500 mg by mouth 2 times daily as needed for mild pain      alendronate (FOSAMAX) 70 MG tablet Take 1 tablet (70 mg) by mouth every 7 days. 12 tablet 1    amLODIPine (NORVASC) 5 MG tablet Take 1 tablet (5 mg) by mouth daily. 90 tablet 0    aspirin 81 MG EC tablet [ASPIRIN 81 MG EC TABLET] Take 81 mg by mouth daily.      atorvastatin (LIPITOR) 80 MG tablet Take 1 tablet (80 mg) by mouth daily 90 tablet 3    calcium carbonate-vitamin D (CALTRATE) 600-10 MG-MCG per tablet Take 1 tablet by mouth every evening. 1200 mg      ezetimibe (ZETIA) 10 MG tablet Take 1 tablet (10 mg) by mouth daily. 90 tablet 3    famotidine (PEPCID) 40 MG tablet Take 1 tablet (40 mg) by mouth daily. 90 tablet 0    Ibuprofen (ADVIL PO) Take 600 mg by mouth 3 times daily as needed for moderate pain.      lisinopril (ZESTRIL) 20 MG tablet Take 1 tablet  (20 mg) by mouth daily 90 tablet 2    Magnesium 400 MG TABS Take 1 tablet by mouth daily      metoprolol succinate ER (TOPROL XL) 25 MG 24 hr tablet Take 0.5 tablets (12.5 mg) by mouth daily. 90 tablet 2    multivitamin (CENTRUM SILVER) tablet Take 1 tablet by mouth daily      nitroGLYcerin (NITROSTAT) 0.4 MG sublingual tablet For chest pain place 1 tablet under the tongue every 5 minutes for 3 doses. If symptoms persist 5 minutes after 1st dose call 911. 25 tablet 1    oxyCODONE (ROXICODONE) 5 MG tablet Take 1-2 tablets (5-10 mg) by mouth nightly as needed for severe pain 14 tablet 0    predniSONE (DELTASONE) 1 MG tablet Combine with prednisone 1 mg tablet to follow instructions: Prednisone 12.5 mg daily for 2 week Then prednisone 10 mg daily for 2 week Then prednisone 9 mg daily for 1 week Then prednisone 8 mg daily for 1 week Then prednisone 7 mg daily for 1 week Then prednisone 6 mg daily for 2 weeks Then prednisone 5 mg daily for 2 weeks Then prednisone 4 mg daily for 2 weeks Then prednisone 3 mg daily for 2 weeks Then prednisone 2 mg daily for 2 weeks Then prednisone 1 mg daily for 2 weeks then stop 150 tablet 2    predniSONE (DELTASONE) 5 MG tablet Combine with prednisone 1 mg tablet to follow instructions: Prednisone 12.5 mg daily for 2 week Then prednisone 10 mg daily for 2 week Then prednisone 9 mg daily for 1 week Then prednisone 8 mg daily for 1 week Then prednisone 7 mg daily for 1 week Then prednisone 6 mg daily for 2 weeks Then prednisone 5 mg daily for 2 weeks Then prednisone 4 mg daily for 2 weeks Then prednisone 3 mg daily for 2 weeks Then prednisone 2 mg daily for 2 weeks Then prednisone 1 mg daily for 2 weeks then stop 150 tablet 1    sildenafil (VIAGRA) 50 MG tablet Take 1 tablet (50 mg) by mouth daily as needed (ED) 30 tablet 1    atovaquone (MEPRON) 750 MG/5ML suspension Take 10 mLs (1,500 mg) by mouth daily. (Patient not taking: Reported on 3/3/2025) 900 mL 0    sarilumab (KEVZARA) 200  "MG/1.14ML injection Inject 1.14 mLs (200 mg) subcutaneously every 14 days. (Patient not taking: Reported on 3/3/2025) 2.28 mL 2      No Known Allergies     Physical Examination Review of Systems   Vitals: Ht 1.778 m (5' 10\")   Wt 86.6 kg (191 lb)   BMI 27.41 kg/m    BMI= Body mass index is 27.41 kg/m .  Wt Readings from Last 3 Encounters:   03/03/25 86.6 kg (191 lb)   02/17/25 86.2 kg (190 lb)   02/12/25 82.6 kg (182 lb)       General: pleasant male. No acute distress.   Neck: No JVD  Lungs: clear to auscultation  COR:  regular rate and rhythm, No murmurs, rubs, or gallops  Extrem: No edema        Please refer above for cardiac ROS details.       Past History     Family History:   Family History   Problem Relation Age of Onset    Cancer Mother     Coronary Artery Disease Father         Social History:   Social History     Socioeconomic History    Marital status:      Spouse name: Not on file    Number of children: Not on file    Years of education: Not on file    Highest education level: Not on file   Occupational History    Not on file   Tobacco Use    Smoking status: Never    Smokeless tobacco: Never   Vaping Use    Vaping status: Never Used   Substance and Sexual Activity    Alcohol use: Not Currently    Drug use: Yes     Frequency: 0.2 times per week     Types: Marijuana     Comment: \"Once per month\"    Sexual activity: Not on file   Other Topics Concern    Not on file   Social History Narrative    Not on file     Social Drivers of Health     Financial Resource Strain: Not on file   Food Insecurity: Not on file   Transportation Needs: Not on file   Physical Activity: Not on file   Stress: Not on file   Social Connections: Not on file   Interpersonal Safety: Low Risk  (5/1/2024)    Interpersonal Safety     Do you feel physically and emotionally safe where you currently live?: Yes     Within the past 12 months, have you been hit, slapped, kicked or otherwise physically hurt by someone?: No     Within the " past 12 months, have you been humiliated or emotionally abused in other ways by your partner or ex-partner?: No   Housing Stability: Not on file            Lab Results    Chemistry/lipid CBC Cardiac Enzymes/BNP/TSH/INR   Lab Results   Component Value Date    CHOL 166 01/20/2025    HDL 70 01/20/2025    TRIG 82 01/20/2025    BUN 25.0 (H) 01/20/2025     01/20/2025    CO2 22 01/20/2025    Lab Results   Component Value Date    WBC 10.1 02/03/2025    HGB 13.9 02/03/2025    HCT 41.4 02/03/2025    MCV 91 02/03/2025     02/03/2025    Lab Results   Component Value Date     (H) 03/09/2018    TSH 0.38 05/29/2024          Cardiac Problems and Cardiac Diagnostics     Most Recent Cardiac testing:  ECG Personal interpretation  12/31/2024  SB    ECHO 12/31/2024  Interpretation Summary     1. Normal left ventricular size and systolic performance with a visually  estimated ejection fraction of 60%.  2. There is mild aortic insufficiency.  3. There is mild to moderate mitral insufficiency.  4. There is mild left atrial enlargement.  5. There is mild enlargement of the proximal ascending aorta.    Stress test: 12/31/2024  Interpretation Summary  1. Normal stress echocardiogram without evidence of stress induced ischemia.  2. Normal resting LV systolic performance with an ejection fraction of 55-60%.  There is normal improvement in left ventricular systolic performance with a  peak ejection fraction of 70-75%.  3. No ECG evidence of ischemia.  4. No anginal chest pain reported with exercise.  5. Good functional capacity for age.    Cardiac cath 4/14/2017:  LM minimal dz  LAD 90% prox/mid LAD ISR  LCx mild dz  RCA mild dz     Successful PCI of prox/mid LAD with 2.5x32 Synergy ALONDRA, post dilated to high ny with 2.75 NC balloon  0% residual, NIK 3 flow pre and post       Assessment/Recommendations   Assessment:    Mr. Minor Murcia is a 65 year old male with a significant past history of CAD s/p PCI to distal RCA and mid  LAD (2005) c/b ISR s/p PCI of prox/mid LAD (2017), HTN, HLD, PMR, recent diagnosis of giant cell arteritis with aorta, mPA, celiac artery involvement,  who presents for follow up.      Ascending aorta dilation   - Likely driven by GCA involvement of ascending aorta and arch   - Measures 4.4 on CT in 12/2024.  Measured 4.1 cm on previous echo.  Given measures do not correlate will plan on monitoring with cross sectional imaging in the future.  A follow up PET/CT is planned in about 6 months which I agree with.  Will have to track size as well as rate of progression.   - Continue tight BP control, management of GCA per rheum    CAD   - s/p PCI to distal RCA and mid LAD (2005) c/b ISR s/p PCI of prox/mid LAD (2017)   - LDL 80.  Continue atorvastatin 80mg and zetia.  Continue to work on lifestyle to target LDL <70.  Consider switch to rosuvastatin in the future however still on prednisone which may be driving.   - Cont ASA 81mg   - Cont low dose metoprolol.  Mild resting bradycardia, asymptomatic   - Stress test 12/2024 wnl     HTN   - Well controlled   - Continue current management    RTC 6 months    The longitudinal plan of care for the diagnosis(es)/condition(s) as documented were addressed during this visit. Due to the added complexity in care, I will continue to support Minor in the subsequent management and with ongoing continuity of care.           Julio César Ro DO [facility]  Non-invasive Cardiologist  [facility]

## 2025-03-03 NOTE — LETTER
3/3/2025    Valentino Lu MD, MD  [address]  [facility] [address]    RE: Minor Murcia       Dear Colleague,     I had the pleasure of seeing Minor Murcia in the [facility] Heart Clinic.    [facility] HEART CARE   [address]  [place] [address]   [email or web address]   OFFICE: [phone]     CARDIOLOGY CLINIC NOTE     Thank you, Valentino Hernandez, for asking the [facility] Heart Care team to see Mr. Minor Murcia to evaluate Follow Up        History of Present Illness   Mr. Minor Murcia is a 65 year old male with a significant past history of CAD s/p PCI to distal RCA and mid LAD (2005) c/b ISR s/p PCI of prox/mid LAD (2017), HTN, HLD, PMR, recent diagnosis of giant cell arteritis with aorta, mPA, celiac artery involvement,  who presents for follow up.  The patient notes he feels pretty good.  He denies any active symptoms.  He was admitted to the hospital in December with chest pain and workup there was negative.  He denies any more chest pain episodes.  He did have a PET/CT in December which showed findings consistent with GCA and ascending aorta measuring 4.4 cm.  BP has been well controlled.           Medications  Allergies   Current Outpatient Medications   Medication Sig Dispense Refill     acetaminophen (TYLENOL) 160 MG TBDP Take 500 mg by mouth 2 times daily as needed for mild pain       alendronate (FOSAMAX) 70 MG tablet Take 1 tablet (70 mg) by mouth every 7 days. 12 tablet 1     amLODIPine (NORVASC) 5 MG tablet Take 1 tablet (5 mg) by mouth daily. 90 tablet 0     aspirin 81 MG EC tablet [ASPIRIN 81 MG EC TABLET] Take 81 mg by mouth daily.       atorvastatin (LIPITOR) 80 MG tablet Take 1 tablet (80 mg) by mouth daily 90 tablet 3     calcium carbonate-vitamin D (CALTRATE) 600-10 MG-MCG per tablet Take 1 tablet by mouth every evening. 1200 mg       ezetimibe (ZETIA) 10 MG tablet Take 1 tablet (10 mg) by mouth daily. 90 tablet 3     famotidine  (PEPCID) 40 MG tablet Take 1 tablet (40 mg) by mouth daily. 90 tablet 0     Ibuprofen (ADVIL PO) Take 600 mg by mouth 3 times daily as needed for moderate pain.       lisinopril (ZESTRIL) 20 MG tablet Take 1 tablet (20 mg) by mouth daily 90 tablet 2     Magnesium 400 MG TABS Take 1 tablet by mouth daily       metoprolol succinate ER (TOPROL XL) 25 MG 24 hr tablet Take 0.5 tablets (12.5 mg) by mouth daily. 90 tablet 2     multivitamin (CENTRUM SILVER) tablet Take 1 tablet by mouth daily       nitroGLYcerin (NITROSTAT) 0.4 MG sublingual tablet For chest pain place 1 tablet under the tongue every 5 minutes for 3 doses. If symptoms persist 5 minutes after 1st dose call 911. 25 tablet 1     oxyCODONE (ROXICODONE) 5 MG tablet Take 1-2 tablets (5-10 mg) by mouth nightly as needed for severe pain 14 tablet 0     predniSONE (DELTASONE) 1 MG tablet Combine with prednisone 1 mg tablet to follow instructions: Prednisone 12.5 mg daily for 2 week Then prednisone 10 mg daily for 2 week Then prednisone 9 mg daily for 1 week Then prednisone 8 mg daily for 1 week Then prednisone 7 mg daily for 1 week Then prednisone 6 mg daily for 2 weeks Then prednisone 5 mg daily for 2 weeks Then prednisone 4 mg daily for 2 weeks Then prednisone 3 mg daily for 2 weeks Then prednisone 2 mg daily for 2 weeks Then prednisone 1 mg daily for 2 weeks then stop 150 tablet 2     predniSONE (DELTASONE) 5 MG tablet Combine with prednisone 1 mg tablet to follow instructions: Prednisone 12.5 mg daily for 2 week Then prednisone 10 mg daily for 2 week Then prednisone 9 mg daily for 1 week Then prednisone 8 mg daily for 1 week Then prednisone 7 mg daily for 1 week Then prednisone 6 mg daily for 2 weeks Then prednisone 5 mg daily for 2 weeks Then prednisone 4 mg daily for 2 weeks Then prednisone 3 mg daily for 2 weeks Then prednisone 2 mg daily for 2 weeks Then prednisone 1 mg daily for 2 weeks then stop 150 tablet 1     sildenafil (VIAGRA) 50 MG tablet Take 1  "tablet (50 mg) by mouth daily as needed (ED) 30 tablet 1     atovaquone (MEPRON) 750 MG/5ML suspension Take 10 mLs (1,500 mg) by mouth daily. (Patient not taking: Reported on 3/3/2025) 900 mL 0     sarilumab (KEVZARA) 200 MG/1.14ML injection Inject 1.14 mLs (200 mg) subcutaneously every 14 days. (Patient not taking: Reported on 3/3/2025) 2.28 mL 2      No Known Allergies     Physical Examination Review of Systems   Vitals: Ht 1.778 m (5' 10\")   Wt 86.6 kg (191 lb)   BMI 27.41 kg/m    BMI= Body mass index is 27.41 kg/m .  Wt Readings from Last 3 Encounters:   03/03/25 86.6 kg (191 lb)   02/17/25 86.2 kg (190 lb)   02/12/25 82.6 kg (182 lb)       General: pleasant male. No acute distress.   Neck: No JVD  Lungs: clear to auscultation  COR:  regular rate and rhythm, No murmurs, rubs, or gallops  Extrem: No edema        Please refer above for cardiac ROS details.       Past History     Family History:   Family History   Problem Relation Age of Onset     Cancer Mother      Coronary Artery Disease Father         Social History:   Social History     Socioeconomic History     Marital status:      Spouse name: Not on file     Number of children: Not on file     Years of education: Not on file     Highest education level: Not on file   Occupational History     Not on file   Tobacco Use     Smoking status: Never     Smokeless tobacco: Never   Vaping Use     Vaping status: Never Used   Substance and Sexual Activity     Alcohol use: Not Currently     Drug use: Yes     Frequency: 0.2 times per week     Types: Marijuana     Comment: \"Once per month\"     Sexual activity: Not on file   Other Topics Concern     Not on file   Social History Narrative     Not on file     Social Drivers of Health     Financial Resource Strain: Not on file   Food Insecurity: Not on file   Transportation Needs: Not on file   Physical Activity: Not on file   Stress: Not on file   Social Connections: Not on file   Interpersonal Safety: Low Risk  " (5/1/2024)    Interpersonal Safety      Do you feel physically and emotionally safe where you currently live?: Yes      Within the past 12 months, have you been hit, slapped, kicked or otherwise physically hurt by someone?: No      Within the past 12 months, have you been humiliated or emotionally abused in other ways by your partner or ex-partner?: No   Housing Stability: Not on file            Lab Results    Chemistry/lipid CBC Cardiac Enzymes/BNP/TSH/INR   Lab Results   Component Value Date    CHOL 166 01/20/2025    HDL 70 01/20/2025    TRIG 82 01/20/2025    BUN 25.0 (H) 01/20/2025     01/20/2025    CO2 22 01/20/2025    Lab Results   Component Value Date    WBC 10.1 02/03/2025    HGB 13.9 02/03/2025    HCT 41.4 02/03/2025    MCV 91 02/03/2025     02/03/2025    Lab Results   Component Value Date     (H) 03/09/2018    TSH 0.38 05/29/2024          Cardiac Problems and Cardiac Diagnostics     Most Recent Cardiac testing:  ECG Personal interpretation  12/31/2024  SB    ECHO 12/31/2024  Interpretation Summary     1. Normal left ventricular size and systolic performance with a visually  estimated ejection fraction of 60%.  2. There is mild aortic insufficiency.  3. There is mild to moderate mitral insufficiency.  4. There is mild left atrial enlargement.  5. There is mild enlargement of the proximal ascending aorta.    Stress test: 12/31/2024  Interpretation Summary  1. Normal stress echocardiogram without evidence of stress induced ischemia.  2. Normal resting LV systolic performance with an ejection fraction of 55-60%.  There is normal improvement in left ventricular systolic performance with a  peak ejection fraction of 70-75%.  3. No ECG evidence of ischemia.  4. No anginal chest pain reported with exercise.  5. Good functional capacity for age.    Cardiac cath 4/14/2017:  LM minimal dz  LAD 90% prox/mid LAD ISR  LCx mild dz  RCA mild dz     Successful PCI of prox/mid LAD with 2.5x32 Synergy ALONDRA,  post dilated to high ny with 2.75 NC balloon  0% residual, NIK 3 flow pre and post       Assessment/Recommendations   Assessment:    Mr. Minor Murcia is a 65 year old male with a significant past history of CAD s/p PCI to distal RCA and mid LAD (2005) c/b ISR s/p PCI of prox/mid LAD (2017), HTN, HLD, PMR, recent diagnosis of giant cell arteritis with aorta, mPA, celiac artery involvement,  who presents for follow up.      Ascending aorta dilation   - Likely driven by GCA involvement of ascending aorta and arch   - Measures 4.4 on CT in 12/2024.  Measured 4.1 cm on previous echo.  Given measures do not correlate will plan on monitoring with cross sectional imaging in the future.  A follow up PET/CT is planned in about 6 months which I agree with.  Will have to track size as well as rate of progression.   - Continue tight BP control, management of GCA per rheum    CAD   - s/p PCI to distal RCA and mid LAD (2005) c/b ISR s/p PCI of prox/mid LAD (2017)   - LDL 80.  Continue atorvastatin 80mg and zetia.  Continue to work on lifestyle to target LDL <70.  Consider switch to rosuvastatin in the future however still on prednisone which may be driving.   - Cont ASA 81mg   - Cont low dose metoprolol.  Mild resting bradycardia, asymptomatic   - Stress test 12/2024 wnl     HTN   - Well controlled   - Continue current management    RTC 6 months    The longitudinal plan of care for the diagnosis(es)/condition(s) as documented were addressed during this visit. Due to the added complexity in care, I will continue to support Minor in the subsequent management and with ongoing continuity of care.           Julio César Ro DO [facility]  Non-invasive Cardiologist  [facility] Heart Care         Thank you for allowing me to participate in the care of your patient.      Sincerely,     Julio César Ro DO     [facility] Heart Care  cc:   Valentino Lu MD  [address]  [place]  [address]

## 2025-03-03 NOTE — PATIENT INSTRUCTIONS
It was a pleasure meeting you today    In summary:    We will follow up on the PET/CT scan and continue the same medicines.    Please call my nurse Rodney Sandoval at [phone] if you have any questions or issues.    We will schedule a follow up visit in  6 months      Julio César Ro DO [facility]  Non-invasive Cardiologist  [facility]

## 2025-03-05 ENCOUNTER — TELEPHONE (OUTPATIENT)
Dept: RHEUMATOLOGY | Facility: CLINIC | Age: 65
End: 2025-03-05
Payer: COMMERCIAL

## 2025-03-05 NOTE — TELEPHONE ENCOUNTER
[facility] Call Center    Phone Message    May a detailed message be left on voicemail: yes     Reason for Call: Order(s): Other:   Reason for requested: infusion, per caller pt wants to start receiving [facility] infusions [facility].   Date needed: asap   Provider name:      Action Taken: Other: rheum     Travel Screening: Not Applicable     Date of Service:

## 2025-03-06 ENCOUNTER — MYC REFILL (OUTPATIENT)
Dept: RHEUMATOLOGY | Facility: CLINIC | Age: 65
End: 2025-03-06
Payer: COMMERCIAL

## 2025-03-06 DIAGNOSIS — M85.859 OSTEOPENIA OF HIP, UNSPECIFIED LATERALITY: ICD-10-CM

## 2025-03-06 NOTE — TELEPHONE ENCOUNTER
Forms received from [facility].  Dr. Woodson signed and I faxed this back.  I let pt know via [employer] message in another encounter.

## 2025-03-08 RX ORDER — ALENDRONATE SODIUM 70 MG/1
70 TABLET ORAL
Qty: 12 TABLET | Refills: 1 | Status: SHIPPED | OUTPATIENT
Start: 2025-03-08

## 2025-03-08 NOTE — TELEPHONE ENCOUNTER
alendronate (FOSAMAX) 70 MG tablet   Start: 09/04/2024   Disp 12  R 1  Take 1 tablet (70 mg) by mouth every 7 days.     Valdez Woodson MD  Rheumatology   2/12/25  [facility]   6/13/25    Refill decision: Medication refilled per  Medication Refill in Ambulatory Care  policy.my chart request    Request from pharmacy:  Requested Prescriptions   Pending Prescriptions Disp Refills    alendronate (FOSAMAX) 70 MG tablet 12 tablet 1     Sig: Take 1 tablet (70 mg) by mouth every 7 days.       Bisphosphonates Passed - 3/8/2025 11:16 AM        Passed - Dexa scan completed in the past 48-months     Please review last Dexa result.           Passed - Medication is active on med list and the sig matches. RN to manually verify dose and sig if red X/fail.     If the protocol passes (green check), you do not need to verify med dose and sig.    A prescription matches if they are the same clinical intention.    For Example: once daily and every morning are the same.    The protocol can not identify upper and lower case letters as matching and will fail.     For Example: Take 1 tablet (50 mg) by mouth daily     TAKE 1 TABLET (50 MG) BY MOUTH DAILY    For all fails (red x), verify dose and sig.    If the refill does match what is on file, the RN can still proceed to approve the refill request.       If they do not match, route to the appropriate provider.             Passed - Medication indicated for associated diagnosis     The medication is prescribed for one or more of the following conditions:     Osteoporosis   Osteitis Deformans (Paget's Disease)   Postmenopausal    Osteopenia   Arthroplasty   Crohn's Disease   Cystic Fibrosis   Fibrous Dysplasia of bone   Growth Hormone Deficiency   Hypercalcemia   Juvenile Osteoporosis   Hypogonadism          Passed - Recent (12 mo) or future (90 days) visit within the authorizing provider's specialty     The patient must have completed an in-person or virtual visit within the past 12  months or has a future visit scheduled within the next 90 days with the authorizing provider s specialty.  Urgent care and e-visits do not qualify as an office visit for this protocol.          Passed - Most recent Creatinine Clearance in last 12 months >35        Passed - Patient is age 18 or older

## 2025-03-16 ENCOUNTER — MYC REFILL (OUTPATIENT)
Dept: CARDIOLOGY | Facility: CLINIC | Age: 65
End: 2025-03-16
Payer: COMMERCIAL

## 2025-03-16 DIAGNOSIS — E78.2 MIXED HYPERLIPIDEMIA: ICD-10-CM

## 2025-03-16 DIAGNOSIS — I25.10 CORONARY ARTERY DISEASE INVOLVING NATIVE HEART WITHOUT ANGINA PECTORIS, UNSPECIFIED VESSEL OR LESION TYPE: ICD-10-CM

## 2025-03-17 RX ORDER — EZETIMIBE 10 MG/1
10 TABLET ORAL DAILY
Qty: 90 TABLET | Refills: 3 | Status: SHIPPED | OUTPATIENT
Start: 2025-03-17

## 2025-03-20 ENCOUNTER — INFUSION THERAPY VISIT (OUTPATIENT)
Dept: INFUSION THERAPY | Facility: HOSPITAL | Age: 65
End: 2025-03-20
Attending: STUDENT IN AN ORGANIZED HEALTH CARE EDUCATION/TRAINING PROGRAM
Payer: COMMERCIAL

## 2025-03-20 VITALS
RESPIRATION RATE: 16 BRPM | SYSTOLIC BLOOD PRESSURE: 114 MMHG | TEMPERATURE: 97.9 F | OXYGEN SATURATION: 96 % | DIASTOLIC BLOOD PRESSURE: 75 MMHG | HEART RATE: 63 BPM

## 2025-03-20 DIAGNOSIS — M31.6 GCA (GIANT CELL ARTERITIS) (H): Primary | ICD-10-CM

## 2025-03-20 LAB
ALBUMIN SERPL BCG-MCNC: 4 G/DL (ref 3.5–5.2)
ALBUMIN UR-MCNC: NEGATIVE MG/DL
ALP SERPL-CCNC: 42 U/L (ref 40–150)
ALT SERPL W P-5'-P-CCNC: 46 U/L (ref 0–70)
ANION GAP SERPL CALCULATED.3IONS-SCNC: 10 MMOL/L (ref 7–15)
APPEARANCE UR: CLEAR
AST SERPL W P-5'-P-CCNC: 40 U/L (ref 0–45)
BASOPHILS # BLD AUTO: 0 10E3/UL (ref 0–0.2)
BASOPHILS NFR BLD AUTO: 0 %
BILIRUB SERPL-MCNC: 1 MG/DL
BILIRUB UR QL STRIP: NEGATIVE
BUN SERPL-MCNC: 19.4 MG/DL (ref 8–23)
CALCIUM SERPL-MCNC: 9.6 MG/DL (ref 8.8–10.4)
CHLORIDE SERPL-SCNC: 106 MMOL/L (ref 98–107)
CHOLEST SERPL-MCNC: 143 MG/DL
COLOR UR AUTO: ABNORMAL
CREAT SERPL-MCNC: 1.04 MG/DL (ref 0.67–1.17)
EGFRCR SERPLBLD CKD-EPI 2021: 79 ML/MIN/1.73M2
EOSINOPHIL # BLD AUTO: 0.1 10E3/UL (ref 0–0.7)
EOSINOPHIL NFR BLD AUTO: 2 %
ERYTHROCYTE [DISTWIDTH] IN BLOOD BY AUTOMATED COUNT: 14 % (ref 10–15)
FASTING STATUS PATIENT QL REPORTED: YES
GLUCOSE SERPL-MCNC: 97 MG/DL (ref 70–99)
GLUCOSE UR STRIP-MCNC: NEGATIVE MG/DL
HCO3 SERPL-SCNC: 26 MMOL/L (ref 22–29)
HCT VFR BLD AUTO: 38.7 % (ref 40–53)
HDLC SERPL-MCNC: 51 MG/DL
HGB BLD-MCNC: 13 G/DL (ref 13.3–17.7)
HGB UR QL STRIP: NEGATIVE
IMM GRANULOCYTES # BLD: 0 10E3/UL
IMM GRANULOCYTES NFR BLD: 0 %
KETONES UR STRIP-MCNC: NEGATIVE MG/DL
LDLC SERPL CALC-MCNC: 70 MG/DL
LEUKOCYTE ESTERASE UR QL STRIP: NEGATIVE
LYMPHOCYTES # BLD AUTO: 2 10E3/UL (ref 0.8–5.3)
LYMPHOCYTES NFR BLD AUTO: 34 %
MCH RBC QN AUTO: 30.3 PG (ref 26.5–33)
MCHC RBC AUTO-ENTMCNC: 33.6 G/DL (ref 31.5–36.5)
MCV RBC AUTO: 90 FL (ref 78–100)
MONOCYTES # BLD AUTO: 0.8 10E3/UL (ref 0–1.3)
MONOCYTES NFR BLD AUTO: 14 %
MUCOUS THREADS #/AREA URNS LPF: PRESENT /LPF
NEUTROPHILS # BLD AUTO: 2.9 10E3/UL (ref 1.6–8.3)
NEUTROPHILS NFR BLD AUTO: 49 %
NITRATE UR QL: NEGATIVE
NONHDLC SERPL-MCNC: 92 MG/DL
NRBC # BLD AUTO: 0 10E3/UL
NRBC BLD AUTO-RTO: 0 /100
PH UR STRIP: 6 [PH] (ref 5–7)
PLATELET # BLD AUTO: 201 10E3/UL (ref 150–450)
POTASSIUM SERPL-SCNC: 4.2 MMOL/L (ref 3.4–5.3)
PROT SERPL-MCNC: 5.7 G/DL (ref 6.4–8.3)
RBC # BLD AUTO: 4.29 10E6/UL (ref 4.4–5.9)
RBC URINE: 0 /HPF
SODIUM SERPL-SCNC: 142 MMOL/L (ref 135–145)
SP GR UR STRIP: 1.01 (ref 1–1.03)
SQUAMOUS EPITHELIAL: <1 /HPF
TRIGL SERPL-MCNC: 110 MG/DL
UROBILINOGEN UR STRIP-MCNC: <2 MG/DL
WBC # BLD AUTO: 5.9 10E3/UL (ref 4–11)
WBC URINE: <1 /HPF

## 2025-03-20 PROCEDURE — 250N000011 HC RX IP 250 OP 636: Mod: JZ | Performed by: STUDENT IN AN ORGANIZED HEALTH CARE EDUCATION/TRAINING PROGRAM

## 2025-03-20 PROCEDURE — 250N000013 HC RX MED GY IP 250 OP 250 PS 637: Performed by: STUDENT IN AN ORGANIZED HEALTH CARE EDUCATION/TRAINING PROGRAM

## 2025-03-20 PROCEDURE — 258N000003 HC RX IP 258 OP 636: Performed by: STUDENT IN AN ORGANIZED HEALTH CARE EDUCATION/TRAINING PROGRAM

## 2025-03-20 RX ORDER — DIPHENHYDRAMINE HYDROCHLORIDE 50 MG/ML
50 INJECTION, SOLUTION INTRAMUSCULAR; INTRAVENOUS
Start: 2025-04-14

## 2025-03-20 RX ORDER — DIPHENHYDRAMINE HCL 25 MG
25 CAPSULE ORAL ONCE
OUTPATIENT
Start: 2025-04-14

## 2025-03-20 RX ORDER — ALBUTEROL SULFATE 90 UG/1
1-2 INHALANT RESPIRATORY (INHALATION)
Status: DISCONTINUED | OUTPATIENT
Start: 2025-03-20 | End: 2025-03-20 | Stop reason: HOSPADM

## 2025-03-20 RX ORDER — HEPARIN SODIUM (PORCINE) LOCK FLUSH IV SOLN 100 UNIT/ML 100 UNIT/ML
5 SOLUTION INTRAVENOUS
OUTPATIENT
Start: 2025-04-14

## 2025-03-20 RX ORDER — METHYLPREDNISOLONE SODIUM SUCCINATE 40 MG/ML
40 INJECTION INTRAMUSCULAR; INTRAVENOUS
Status: DISCONTINUED | OUTPATIENT
Start: 2025-03-20 | End: 2025-03-20 | Stop reason: HOSPADM

## 2025-03-20 RX ORDER — EPINEPHRINE 1 MG/ML
0.3 INJECTION, SOLUTION INTRAMUSCULAR; SUBCUTANEOUS EVERY 5 MIN PRN
Status: DISCONTINUED | OUTPATIENT
Start: 2025-03-20 | End: 2025-03-20 | Stop reason: HOSPADM

## 2025-03-20 RX ORDER — ALBUTEROL SULFATE 0.83 MG/ML
2.5 SOLUTION RESPIRATORY (INHALATION)
Status: DISCONTINUED | OUTPATIENT
Start: 2025-03-20 | End: 2025-03-20 | Stop reason: HOSPADM

## 2025-03-20 RX ORDER — DIPHENHYDRAMINE HYDROCHLORIDE 50 MG/ML
25 INJECTION, SOLUTION INTRAMUSCULAR; INTRAVENOUS
Start: 2025-04-14

## 2025-03-20 RX ORDER — HEPARIN SODIUM,PORCINE 10 UNIT/ML
5-20 VIAL (ML) INTRAVENOUS DAILY PRN
OUTPATIENT
Start: 2025-04-14

## 2025-03-20 RX ORDER — ALBUTEROL SULFATE 0.83 MG/ML
2.5 SOLUTION RESPIRATORY (INHALATION)
OUTPATIENT
Start: 2025-04-14

## 2025-03-20 RX ORDER — ACETAMINOPHEN 325 MG/1
650 TABLET ORAL ONCE
OUTPATIENT
Start: 2025-04-14

## 2025-03-20 RX ORDER — METHYLPREDNISOLONE SODIUM SUCCINATE 125 MG/2ML
125 INJECTION INTRAMUSCULAR; INTRAVENOUS ONCE
Status: COMPLETED | OUTPATIENT
Start: 2025-03-20 | End: 2025-03-20

## 2025-03-20 RX ORDER — ACETAMINOPHEN 325 MG/1
650 TABLET ORAL ONCE
Status: COMPLETED | OUTPATIENT
Start: 2025-03-20 | End: 2025-03-20

## 2025-03-20 RX ORDER — METHYLPREDNISOLONE SODIUM SUCCINATE 125 MG/2ML
125 INJECTION INTRAMUSCULAR; INTRAVENOUS ONCE
OUTPATIENT
Start: 2025-04-14

## 2025-03-20 RX ORDER — DIPHENHYDRAMINE HYDROCHLORIDE 50 MG/ML
50 INJECTION, SOLUTION INTRAMUSCULAR; INTRAVENOUS
Status: DISCONTINUED | OUTPATIENT
Start: 2025-03-20 | End: 2025-03-20 | Stop reason: HOSPADM

## 2025-03-20 RX ORDER — METHYLPREDNISOLONE SODIUM SUCCINATE 40 MG/ML
40 INJECTION INTRAMUSCULAR; INTRAVENOUS
Start: 2025-04-14

## 2025-03-20 RX ORDER — DIPHENHYDRAMINE HYDROCHLORIDE 50 MG/ML
25 INJECTION, SOLUTION INTRAMUSCULAR; INTRAVENOUS
Status: DISCONTINUED | OUTPATIENT
Start: 2025-03-20 | End: 2025-03-20 | Stop reason: HOSPADM

## 2025-03-20 RX ORDER — ALBUTEROL SULFATE 90 UG/1
1-2 INHALANT RESPIRATORY (INHALATION)
Start: 2025-04-14

## 2025-03-20 RX ORDER — DIPHENHYDRAMINE HCL 25 MG
25 CAPSULE ORAL ONCE
Status: COMPLETED | OUTPATIENT
Start: 2025-03-20 | End: 2025-03-20

## 2025-03-20 RX ORDER — EPINEPHRINE 1 MG/ML
0.3 INJECTION, SOLUTION INTRAMUSCULAR; SUBCUTANEOUS EVERY 5 MIN PRN
OUTPATIENT
Start: 2025-04-14

## 2025-03-20 RX ADMIN — TOCILIZUMAB 517 MG: 20 INJECTION, SOLUTION, CONCENTRATE INTRAVENOUS at 08:59

## 2025-03-20 RX ADMIN — DIPHENHYDRAMINE HYDROCHLORIDE 25 MG: 25 CAPSULE ORAL at 08:27

## 2025-03-20 RX ADMIN — METHYLPREDNISOLONE SODIUM SUCCINATE 125 MG: 125 INJECTION, POWDER, FOR SOLUTION INTRAMUSCULAR; INTRAVENOUS at 08:27

## 2025-03-20 RX ADMIN — SODIUM CHLORIDE 250 ML: 0.9 INJECTION, SOLUTION INTRAVENOUS at 09:00

## 2025-03-20 RX ADMIN — ACETAMINOPHEN 650 MG: 325 TABLET ORAL at 08:27

## 2025-03-20 NOTE — PROGRESS NOTES
Infusion Nursing Note:  Minor Murcia presents today for Actemra for RA. Treatment #2.    Patient seen by provider today: No   present during visit today: Not Applicable.    Note: Pt came to the clinic ambulatory, reported increased back pain and knee joint pain since last infusion takes tylenol and applies warm pack at home with some relief of symptoms. Reports he continues on prednisone taper at 9 mg PO a day. Labs and urinalysis collected. Pre medicated prior and tolerated infusion well.     Premedications: Tylenol 650 mg PO, Benadryl 25 mg PO, Methylprednisone 40 mg IV      Intravenous Access:  Peripheral IV placed.    Treatment Conditions:  Biological Infusion Checklist:  ~~~ NOTE: If the patient answers yes to any of the questions below, hold the infusion and contact ordering provider or on-call provider.    Have you recently had an elevated temperature, fever, chills, productive cough, coughing for 3 weeks or longer or hemoptysis,  abnormal vital signs, night sweats,  chest pain or have you noticed a decrease in your appetite, unexplained weight loss or fatigue? No  Do you have any open wounds or new incisions? No  Do you have any upcoming hospitalizations or surgeries? Does not include esophagogastroduodenoscopy, colonoscopy, endoscopic retrograde cholangiopancreatography (ERCP), endoscopic ultrasound (EUS), dental procedures or joint aspiration/steroid injections No  Do you currently have any signs of illness or infection or are you on any antibiotics? No  Have you had any new, sudden or worsening abdominal pain? No  Have you or anyone in your household received a live vaccination in the past 4 weeks? Please note: No live vaccines while on biologic/chemotherapy until 6 months after the last treatment. Patient can receive the flu vaccine (shot only), pneumovax and the Covid vaccine. It is optimal for the patient to get these vaccines mid cycle, but they can be given at any time as long as it is not  on the day of the infusion. No  Have you recently been diagnosed with any new nervous system diseases (ie. Multiple sclerosis, Guillain [place], seizures, neurological changes) or cancer diagnosis? Are you on any form of radiation or chemotherapy? No  Are you pregnant or breast feeding or do you have plans of pregnancy in the future? No  Have you been having any signs of worsening depression or suicidal ideations?  (benlysta only) No  Have there been any other new onset medical symptoms? No  Have you had any new blood clots? (IVIG only) No      Post Infusion Assessment:  Patient tolerated infusion without incident.  Blood return noted pre and post infusion.  Site patent and intact, free from redness, edema or discomfort.  No evidence of extravasations.  Access discontinued per protocol.       Discharge Plan:   Discharge instructions reviewed with: Patient.  Patient and/or family verbalized understanding of discharge instructions and all questions answered.  Patient discharged in stable condition accompanied by: self.  Departure Mode: Ambulatory.      Dali Garcia RN

## 2025-03-27 SDOH — HEALTH STABILITY: PHYSICAL HEALTH: ON AVERAGE, HOW MANY DAYS PER WEEK DO YOU ENGAGE IN MODERATE TO STRENUOUS EXERCISE (LIKE A BRISK WALK)?: 0 DAYS

## 2025-03-27 SDOH — HEALTH STABILITY: PHYSICAL HEALTH: ON AVERAGE, HOW MANY MINUTES DO YOU ENGAGE IN EXERCISE AT THIS LEVEL?: 0 MIN

## 2025-03-27 ASSESSMENT — SOCIAL DETERMINANTS OF HEALTH (SDOH): HOW OFTEN DO YOU GET TOGETHER WITH FRIENDS OR RELATIVES?: ONCE A WEEK

## 2025-03-30 ENCOUNTER — MYC REFILL (OUTPATIENT)
Dept: CARDIOLOGY | Facility: CLINIC | Age: 65
End: 2025-03-30
Payer: COMMERCIAL

## 2025-03-30 DIAGNOSIS — I10 PRIMARY HYPERTENSION: ICD-10-CM

## 2025-03-31 ENCOUNTER — TELEPHONE (OUTPATIENT)
Dept: RHEUMATOLOGY | Facility: CLINIC | Age: 65
End: 2025-03-31
Payer: COMMERCIAL

## 2025-03-31 RX ORDER — AMLODIPINE BESYLATE 5 MG/1
5 TABLET ORAL DAILY
Qty: 90 TABLET | Refills: 1 | Status: SHIPPED | OUTPATIENT
Start: 2025-03-31

## 2025-03-31 NOTE — TELEPHONE ENCOUNTER
[facility] Call Center    Phone Message    May a detailed message be left on voicemail: yes     Reason for Call: Other: Please call and assist Minor has been waiting for orders to be sent to [facility] care for Actemra as of today 3/31/25 no orders have been placed [phone] thank you ( per call from [employer] insurance [phone] ex [phone] thank you     Action Taken: Other: Rheum    Travel Screening: Not Applicable     Date of Service:

## 2025-03-31 NOTE — TELEPHONE ENCOUNTER
Call to [facility] and directed [facility] to this message.      Also routed note to Eliz Armendariz RPH, asking for protocol to send infusion orders to [facility].

## 2025-04-01 ENCOUNTER — ORDERS ONLY (AUTO-RELEASED) (OUTPATIENT)
Dept: FAMILY MEDICINE | Facility: CLINIC | Age: 65
End: 2025-04-01

## 2025-04-01 ENCOUNTER — OFFICE VISIT (OUTPATIENT)
Dept: FAMILY MEDICINE | Facility: CLINIC | Age: 65
End: 2025-04-01
Payer: COMMERCIAL

## 2025-04-01 VITALS
RESPIRATION RATE: 20 BRPM | DIASTOLIC BLOOD PRESSURE: 74 MMHG | HEART RATE: 70 BPM | WEIGHT: 194 LBS | TEMPERATURE: 98 F | SYSTOLIC BLOOD PRESSURE: 130 MMHG | HEIGHT: 70 IN | BODY MASS INDEX: 27.77 KG/M2 | OXYGEN SATURATION: 97 %

## 2025-04-01 DIAGNOSIS — M35.3 PMR (POLYMYALGIA RHEUMATICA): ICD-10-CM

## 2025-04-01 DIAGNOSIS — I10 ESSENTIAL HYPERTENSION: ICD-10-CM

## 2025-04-01 DIAGNOSIS — Z12.11 COLON CANCER SCREENING: ICD-10-CM

## 2025-04-01 DIAGNOSIS — Z00.00 MEDICARE ANNUAL WELLNESS VISIT, SUBSEQUENT: Primary | ICD-10-CM

## 2025-04-01 DIAGNOSIS — E78.5 HYPERLIPIDEMIA, UNSPECIFIED HYPERLIPIDEMIA TYPE: ICD-10-CM

## 2025-04-01 DIAGNOSIS — M31.6 GCA (GIANT CELL ARTERITIS) (H): ICD-10-CM

## 2025-04-01 DIAGNOSIS — I25.10 CORONARY ARTERY DISEASE INVOLVING NATIVE CORONARY ARTERY OF NATIVE HEART WITHOUT ANGINA PECTORIS: ICD-10-CM

## 2025-04-01 PROCEDURE — 3075F SYST BP GE 130 - 139MM HG: CPT | Performed by: FAMILY MEDICINE

## 2025-04-01 PROCEDURE — 3078F DIAST BP <80 MM HG: CPT | Performed by: FAMILY MEDICINE

## 2025-04-01 PROCEDURE — 99397 PER PM REEVAL EST PAT 65+ YR: CPT | Performed by: FAMILY MEDICINE

## 2025-04-01 PROCEDURE — 1126F AMNT PAIN NOTED NONE PRSNT: CPT | Performed by: FAMILY MEDICINE

## 2025-04-01 ASSESSMENT — ACTIVITIES OF DAILY LIVING (ADL)
CONCENTRATING,_REMEMBERING_OR_MAKING_DECISIONS_DIFFICULTY: NO
TOILETING_ISSUES: NO
WALKING_OR_CLIMBING_STAIRS_DIFFICULTY: NO
DRESSING/BATHING_DIFFICULTY: NO
HEARING_DIFFICULTY_OR_DEAF: NO
DIFFICULTY_EATING/SWALLOWING: NO
FALL_HISTORY_WITHIN_LAST_SIX_MONTHS: NO
DOING_ERRANDS_INDEPENDENTLY_DIFFICULTY: NO
CHANGE_IN_FUNCTIONAL_STATUS_SINCE_ONSET_OF_CURRENT_ILLNESS/INJURY: NO
DIFFICULTY_COMMUNICATING: NO
WEAR_GLASSES_OR_BLIND: YES

## 2025-04-01 ASSESSMENT — PAIN SCALES - GENERAL: PAINLEVEL_OUTOF10: NO PAIN (0)

## 2025-04-01 NOTE — TELEPHONE ENCOUNTER
Chiara with [facility] infusion just following up regarding pt wanting to have outside infusions. Caller wanted care team to know providers' care team will be the one to set up outside orders for pt.  Just FYI.

## 2025-04-01 NOTE — PROGRESS NOTES
"Preventive Care Visit  [facility]  Valentino Lu MD, MD, Family Medicine  Apr 1, 2025      Assessment & Plan     Minor was seen today for recheck medication and wellness visit.    Diagnoses and all orders for this visit:    Medicare annual wellness visit, subsequent    Colon cancer screening  -     COLOGUARD([employer]); Future    Hyperlipidemia, unspecified hyperlipidemia type    Coronary artery disease involving native coronary artery of native heart without angina pectoris    GCA (giant cell arteritis) (H)    Essential hypertension    PMR (polymyalgia rheumatica)                   BMI  Estimated body mass index is 27.84 kg/m  as calculated from the following:    Height as of this encounter: 1.778 m (5' 10\").    Weight as of this encounter: 88 kg (194 lb).       Counseling  Appropriate preventive services were addressed with this patient via screening, questionnaire, or discussion as appropriate for fall prevention, nutrition, physical activity, Tobacco-use cessation, social engagement, weight loss and cognition.  Checklist reviewing preventive services available has been given to the patient.  Reviewed patient's diet, addressing concerns and/or questions.   Discussed possible causes of fatigue.         Subjective   Minor is a 65 year old, presenting for the following:  Recheck Medication and Wellness Visit    He has coronary artery disease, hypertension dyslipidemia.  Follows with cardiology.  Has had a longstanding history of mildly widened aorta.  He was diagnosed with polymyalgia rheumatica and has subsequently been found to have giant cell arteritis.  He is under the care of rheumatology and undergoing infusions.  We talked about his clinical course and reviewed specialty notes as well as laboratory testing obtained over the past year.  Patient states he still does not feel well at this point in time.  His symptoms are improved compared to prior to his diagnosis but not back at a " fully functioning level although he continues to work on a regular basis.  We talked about routine health prevention.  He is overdue for colon cancer screening.  There is no reported history of colon cancer in the family.  Patient reports that his last colonoscopy was 10 years ago.  He did not have any concerning polyps and has no history of concerning polyps.  We talked about an option to obtain a Cologuard test which we will do.  Reviewed immunizations noting he is up-to-date.          4/1/2025     7:00 AM   Additional Questions   Roomed by am cma        Do you have a current opioid prescription? no  Do you use any other controlled substances or medications that are not prescribed by a provider?  no          Advance Care Planning  Patient does not have a Health Care Directive: Advance Directive received and scanned. Click on Code in the patient header to view.      3/27/2025   General Health   How would you rate your overall physical health? (!) FAIR   Feel stress (tense, anxious, or unable to sleep) Not at all         3/27/2025   Nutrition   Diet: Regular (no restrictions)         3/27/2025   Exercise   Days per week of moderate/strenous exercise 0 days   Average minutes spent exercising at this level 0 min   (!) EXERCISE CONCERN      3/27/2025   Social Factors   Frequency of gathering with friends or relatives Once a week   Worry food won't last until get money to buy more No   Food not last or not have enough money for food? No   Do you have housing? (Housing is defined as stable permanent housing and does not include staying ouside in a car, in a tent, in an abandoned building, in an overnight shelter, or couch-surfing.) Yes   Are you worried about losing your housing? No   Lack of transportation? No   Unable to get utilities (heat,electricity)? No         4/1/2025   Fall Risk   Fallen 2 or more times in the past year? No   Trouble with walking or balance? No          3/27/2025   Activities of Daily Living-  "Home Safety   Needs help with the following daily activites None of the above   Safety concerns in the home None of the above         3/27/2025   Dental   Dentist two times every year? Yes         3/27/2025   Hearing Screening   Hearing concerns? None of the above         3/27/2025   Driving Risk Screening   Patient/family members have concerns about driving No         3/27/2025   General Alertness/Fatigue Screening   Have you been more tired than usual lately? (!) YES         3/27/2025   Urinary Incontinence Screening   Bothered by leaking urine in past 6 months No                 3/27/2025   Substance Use   Alcohol more than 3/day or more than 7/wk No   Do you have a current opioid prescription? No   How severe/bad is pain from 1 to 10? 6/10   Do you use any other substances recreationally? No     Social History     Tobacco Use    Smoking status: Never    Smokeless tobacco: Never   Vaping Use    Vaping status: Never Used   Substance Use Topics    Alcohol use: Not Currently    Drug use: Yes     Frequency: 0.2 times per week     Types: Marijuana     Comment: \"Once per month\"           3/27/2025   AAA Screening   Family history of Abdominal Aortic Aneurysm (AAA)? No   Last PSA: No results found for: \"PSA\"  ASCVD Risk   The 10-year ASCVD risk score (Nazia CONTRERAS, et al., 2019) is: 13%    Values used to calculate the score:      Age: 65 years      Sex: Male      Is Non- : No      Diabetic: No      Tobacco smoker: No      Systolic Blood Pressure: 130 mmHg      Is BP treated: Yes      HDL Cholesterol: 51 mg/dL      Total Cholesterol: 143 mg/dL            Reviewed and updated as needed this visit by Provider                      Current providers sharing in care for this patient include:  Patient Care Team:  Valentino Lu MD as PCP - General (Family Practice)  Valentino Lu MD as Assigned PCP  Julio César Ro DO as Physician (Cardiovascular Disease)  Julio César Ro DO as Assigned Heart " "and Vascular Provider  Valdez Woodson MD as Assigned Rheumatology Provider  Real Neri RPH as Pharmacist (Pharmacist)  Eliz Armendariz RPH as Pharmacist (Pharmacist Clinician- Clinical Pharmacy Specialist)  Eliz Armendariz RPH as Assigned MTM Pharmacist    The following health maintenance items are reviewed in Epic and correct as of today:  Health Maintenance   Topic Date Due    ADVANCE CARE PLANNING  Never done    MEDICARE ANNUAL WELLNESS VISIT  03/13/2024    COLORECTAL CANCER SCREENING  06/23/2024    COVID-19 Vaccine (6 - Mixed Product risk 2024-25 season) 03/24/2025    ANNUAL REVIEW OF HM ORDERS  05/01/2025    BMP  03/20/2026    LIPID  03/20/2026    FALL RISK ASSESSMENT  04/01/2026    DIABETES SCREENING  03/20/2028    DTAP/TDAP/TD IMMUNIZATION (3 - Td or Tdap) 09/20/2031    HEPATITIS C SCREENING  Completed    PHQ-2 (once per calendar year)  Completed    INFLUENZA VACCINE  Completed    Pneumococcal Vaccine: 50+ Years  Completed    ZOSTER IMMUNIZATION  Completed    RSV VACCINE  Completed    HPV IMMUNIZATION  Aged Out    MENINGITIS IMMUNIZATION  Aged Out       Complete review of systems is obtained.  Other than the specific considerations noted above complete review of systems is negative.       Objective    Exam  /74   Pulse 70   Temp 98  F (36.7  C)   Resp 20   Ht 1.778 m (5' 10\")   Wt 88 kg (194 lb)   SpO2 97%   BMI 27.84 kg/m     Estimated body mass index is 27.84 kg/m  as calculated from the following:    Height as of this encounter: 1.778 m (5' 10\").    Weight as of this encounter: 88 kg (194 lb).    Physical Exam        General Appearance:    Alert, cooperative, no distress   Eyes:   No scleral icterus or conjunctival irritation       Ears:    Normal TM's and external ear canals, both ears   Throat:   Lips, mucosa, and tongue normal; teeth and gums normal   Neck:   Supple, symmetrical, trachea midline, no adenopathy;        thyroid:  No enlargement/tenderness/nodules   Lungs:     Clear " to auscultation bilaterally, respirations unlabored, no wheezes or crackles   Heart:    Regular rate and rhythm,  No murmur   Extremities:  No edema, no joint swelling or redness, no evidence of any injuries   Skin:  No concerning skin findings, no suspicious moles, no rashes   Neurologic:  On gross examination there is no motor or sensory deficit.  Patient walks with a normal gait                   4/1/2025   Mini Cog   Clock Draw Score 2 Normal   3 Item Recall 3 objects recalled   Mini Cog Total Score 5             Signed Electronically by: Valentino Lu MD

## 2025-04-09 LAB — NONINV COLON CA DNA+OCC BLD SCRN STL QL: NEGATIVE

## 2025-04-10 ENCOUNTER — MYC MEDICAL ADVICE (OUTPATIENT)
Dept: CARDIOLOGY | Facility: CLINIC | Age: 65
End: 2025-04-10
Payer: COMMERCIAL

## 2025-04-14 ENCOUNTER — TELEPHONE (OUTPATIENT)
Dept: RHEUMATOLOGY | Facility: CLINIC | Age: 65
End: 2025-04-14
Payer: COMMERCIAL

## 2025-04-21 ENCOUNTER — TRANSFERRED RECORDS (OUTPATIENT)
Dept: HEALTH INFORMATION MANAGEMENT | Facility: CLINIC | Age: 65
End: 2025-04-21
Payer: COMMERCIAL

## 2025-04-21 ENCOUNTER — TELEPHONE (OUTPATIENT)
Dept: RHEUMATOLOGY | Facility: CLINIC | Age: 65
End: 2025-04-21
Payer: COMMERCIAL

## 2025-04-21 NOTE — TELEPHONE ENCOUNTER
[facility] Call Center    Phone Message    May a detailed message be left on voicemail: yes     Reason for Call: Form or Letter   Type or form/letter needing completion: Forms from [facility] for in-home infusions for pt's Actemra  Provider: Chintan  Date form needed: ASAP  Once completed: Fax form to: number provided on form    Call received from Erika with [facility], she reports they are due to go to pt's home later today to administer his first infusion, so they are hoping these forms can be completed and returned as soon as possible (forms are being sent to fax # [phone] some time this morning).   For any questions, please call Erika on her direct line at [phone].    Action Taken: Other: Rheum    Travel Screening: Not Applicable

## 2025-04-21 NOTE — TELEPHONE ENCOUNTER
EDU James at [facility] to discuss forms further. Message sent to on call provider in Epic chat to discuss form sent. Awaiting return call.    Nathaly Love RN

## 2025-04-22 NOTE — TELEPHONE ENCOUNTER
Provider signed form and form faxed to Erika in pharmacy at [facility]. Writer called Erika and EDU to inform that fax was sent.     Nathaly Love RN

## 2025-04-29 ENCOUNTER — MYC REFILL (OUTPATIENT)
Dept: FAMILY MEDICINE | Facility: CLINIC | Age: 65
End: 2025-04-29
Payer: COMMERCIAL

## 2025-04-29 DIAGNOSIS — I10 ESSENTIAL HYPERTENSION: ICD-10-CM

## 2025-04-29 RX ORDER — LISINOPRIL 20 MG/1
20 TABLET ORAL DAILY
Qty: 90 TABLET | Refills: 2 | Status: SHIPPED | OUTPATIENT
Start: 2025-04-29

## 2025-05-06 DIAGNOSIS — M31.6 GCA (GIANT CELL ARTERITIS) (H): Primary | ICD-10-CM

## 2025-05-19 ENCOUNTER — RESULTS FOLLOW-UP (OUTPATIENT)
Dept: RHEUMATOLOGY | Facility: CLINIC | Age: 65
End: 2025-05-19

## 2025-05-19 ENCOUNTER — LAB (OUTPATIENT)
Dept: LAB | Facility: CLINIC | Age: 65
End: 2025-05-19
Payer: COMMERCIAL

## 2025-05-19 DIAGNOSIS — M35.3 PMR (POLYMYALGIA RHEUMATICA): Primary | ICD-10-CM

## 2025-05-19 LAB
ALBUMIN SERPL BCG-MCNC: 4.3 G/DL (ref 3.5–5.2)
ALBUMIN UR-MCNC: NEGATIVE MG/DL
ALP SERPL-CCNC: 42 U/L (ref 40–150)
ALT SERPL W P-5'-P-CCNC: 53 U/L (ref 0–70)
ANION GAP SERPL CALCULATED.3IONS-SCNC: 11 MMOL/L (ref 7–15)
APPEARANCE UR: CLEAR
AST SERPL W P-5'-P-CCNC: 39 U/L (ref 0–45)
BASOPHILS # BLD AUTO: 0 10E3/UL (ref 0–0.2)
BASOPHILS NFR BLD AUTO: 1 %
BILIRUB SERPL-MCNC: 0.5 MG/DL
BILIRUB UR QL STRIP: NEGATIVE
BUN SERPL-MCNC: 21.2 MG/DL (ref 8–23)
CALCIUM SERPL-MCNC: 9.2 MG/DL (ref 8.8–10.4)
CHLORIDE SERPL-SCNC: 108 MMOL/L (ref 98–107)
CHOLEST SERPL-MCNC: 165 MG/DL
COLOR UR AUTO: YELLOW
CREAT SERPL-MCNC: 1.15 MG/DL (ref 0.67–1.17)
CRP SERPL-MCNC: <3 MG/L
EGFRCR SERPLBLD CKD-EPI 2021: 70 ML/MIN/1.73M2
EOSINOPHIL # BLD AUTO: 0.2 10E3/UL (ref 0–0.7)
EOSINOPHIL NFR BLD AUTO: 4 %
ERYTHROCYTE [DISTWIDTH] IN BLOOD BY AUTOMATED COUNT: 12.4 % (ref 10–15)
ERYTHROCYTE [SEDIMENTATION RATE] IN BLOOD BY WESTERGREN METHOD: 8 MM/HR (ref 0–20)
FASTING STATUS PATIENT QL REPORTED: YES
GLUCOSE SERPL-MCNC: 92 MG/DL (ref 70–99)
GLUCOSE UR STRIP-MCNC: NEGATIVE MG/DL
HCO3 SERPL-SCNC: 24 MMOL/L (ref 22–29)
HCT VFR BLD AUTO: 38.4 % (ref 40–53)
HDLC SERPL-MCNC: 51 MG/DL
HGB BLD-MCNC: 13.1 G/DL (ref 13.3–17.7)
HGB UR QL STRIP: NEGATIVE
IMM GRANULOCYTES # BLD: 0 10E3/UL
IMM GRANULOCYTES NFR BLD: 0 %
KETONES UR STRIP-MCNC: NEGATIVE MG/DL
LDLC SERPL CALC-MCNC: 99 MG/DL
LEUKOCYTE ESTERASE UR QL STRIP: NEGATIVE
LYMPHOCYTES # BLD AUTO: 2 10E3/UL (ref 0.8–5.3)
LYMPHOCYTES NFR BLD AUTO: 47 %
MCH RBC QN AUTO: 30.5 PG (ref 26.5–33)
MCHC RBC AUTO-ENTMCNC: 34.1 G/DL (ref 31.5–36.5)
MCV RBC AUTO: 90 FL (ref 78–100)
MONOCYTES # BLD AUTO: 0.7 10E3/UL (ref 0–1.3)
MONOCYTES NFR BLD AUTO: 16 %
MUCOUS THREADS #/AREA URNS LPF: PRESENT /LPF
NEUTROPHILS # BLD AUTO: 1.4 10E3/UL (ref 1.6–8.3)
NEUTROPHILS NFR BLD AUTO: 32 %
NITRATE UR QL: NEGATIVE
NONHDLC SERPL-MCNC: 114 MG/DL
NRBC # BLD AUTO: 0 10E3/UL
NRBC BLD AUTO-RTO: 0 /100
PH UR STRIP: 5.5 [PH] (ref 5–7)
PLATELET # BLD AUTO: 179 10E3/UL (ref 150–450)
POTASSIUM SERPL-SCNC: 4.3 MMOL/L (ref 3.4–5.3)
PROT SERPL-MCNC: 6.2 G/DL (ref 6.4–8.3)
RBC # BLD AUTO: 4.29 10E6/UL (ref 4.4–5.9)
RBC URINE: <1 /HPF
SODIUM SERPL-SCNC: 143 MMOL/L (ref 135–145)
SP GR UR STRIP: 1.03 (ref 1–1.03)
TRIGL SERPL-MCNC: 76 MG/DL
UROBILINOGEN UR STRIP-MCNC: NORMAL MG/DL
WBC # BLD AUTO: 4.3 10E3/UL (ref 4–11)
WBC URINE: 1 /HPF

## 2025-05-19 PROCEDURE — 86140 C-REACTIVE PROTEIN: CPT

## 2025-05-19 PROCEDURE — 81001 URINALYSIS AUTO W/SCOPE: CPT

## 2025-05-19 PROCEDURE — 83718 ASSAY OF LIPOPROTEIN: CPT

## 2025-05-19 PROCEDURE — 85652 RBC SED RATE AUTOMATED: CPT

## 2025-05-19 PROCEDURE — 85025 COMPLETE CBC W/AUTO DIFF WBC: CPT

## 2025-05-19 PROCEDURE — 36415 COLL VENOUS BLD VENIPUNCTURE: CPT

## 2025-05-19 PROCEDURE — 82374 ASSAY BLOOD CARBON DIOXIDE: CPT

## 2025-06-16 ENCOUNTER — MYC REFILL (OUTPATIENT)
Dept: FAMILY MEDICINE | Facility: CLINIC | Age: 65
End: 2025-06-16
Payer: COMMERCIAL

## 2025-06-16 ENCOUNTER — VIRTUAL VISIT (OUTPATIENT)
Dept: PHARMACY | Facility: CLINIC | Age: 65
End: 2025-06-16
Attending: STUDENT IN AN ORGANIZED HEALTH CARE EDUCATION/TRAINING PROGRAM
Payer: COMMERCIAL

## 2025-06-16 DIAGNOSIS — I25.10 CORONARY ARTERY DISEASE INVOLVING NATIVE HEART WITHOUT ANGINA PECTORIS, UNSPECIFIED VESSEL OR LESION TYPE: ICD-10-CM

## 2025-06-16 DIAGNOSIS — M31.6 GCA (GIANT CELL ARTERITIS) (H): Primary | ICD-10-CM

## 2025-06-16 DIAGNOSIS — M35.3 PMR (POLYMYALGIA RHEUMATICA): ICD-10-CM

## 2025-06-16 DIAGNOSIS — M19.90 INFLAMMATORY ARTHRITIS: ICD-10-CM

## 2025-06-16 RX ORDER — TOCILIZUMAB 20 MG/ML
6 INJECTION, SOLUTION, CONCENTRATE INTRAVENOUS
Status: SHIPPED
Start: 2025-06-16

## 2025-06-16 RX ORDER — ATORVASTATIN CALCIUM 80 MG/1
80 TABLET, FILM COATED ORAL DAILY
Qty: 90 TABLET | Refills: 2 | Status: SHIPPED | OUTPATIENT
Start: 2025-06-16

## 2025-06-16 NOTE — PROGRESS NOTES
Medication Therapy Management (MTM) Encounter    ASSESSMENT:                            Medication Adherence/Access: See below for considerations.    GCA/PMR (polymyalgia rheumatica)/Inflammatory Arthritis   Provided education on leflunomide today including dosing, general administration, side effects (both common/serious), precautions, monitoring and time to efficacy. Discussed data on risk of infection in depth. Discussed potential need to hold therapy in the setting of signs/symptoms of active infection. Will continue Actemra infusions and prednisone 5 mg once daily. Advised him to reach out if he has questions or any issues arise with medication access and will provide assistance as able.    PLAN:                            Continue Leflunomide 20 mg once daily   Routine monthly labs needed with medication therapy.  Continue Actemra IV infusion, 6 mg/Kg every 4 weeks   Continue prednisone 5 mg daily     Follow-up: with Dr. Woodson 8/8/2025, with me in around 3 months for follow-up and via [employer] as needed.    SUBJECTIVE/OBJECTIVE:                          Minor Murcia is a 65 year old male seen for a follow-up visit.       Reason for visit: Actemra follow-up, leflunomide start.    Allergies/ADRs: Reviewed in chart and None  Past Medical History: Reviewed in chart  Tobacco: He reports that he has never smoked. He has never used smokeless tobacco.  Alcohol: Reviewed in chart    Medication Adherence/Access: no issues reported.    GCA/PMR (polymyalgia rheumatica)/Inflammatory Arthritis   - Leflunomide 20 mg once daily   - Actemra 6 mg/kg IV infusion every 28 days   - Prednisone 5 mg once daily   - Alendronate 70 mg once weekly  - Calcium carbonate - vitamin D 600-10 mg-mcg once daily      Side effects: none reported.    Patient reports that the Actemra is going well but recently has noticed that a week or so before the next injection the effects are wearing off and he is having recurrence of symptoms. Was able to  start the leflunomide last Friday and it has been going well so far. No concern for side effects and no issues with medication access.    Affected areas include the hands, hips, knees, shoulders - hands are the worst     Specialist: Dr. Valdez Woodson MD, Rheumatology. Last visit on 6/13/2025.     Pre-Biologic Screening:   Hep B Surface Antibody Non-reactive (6/6/2024)    Hep B Core Antibody  Non-reactive (6/6/2024)    Hep B Surface Antigen Non-reactive (6/6/2024)    Hep C Antibody  Non-reactive (6/6/2024)    HIV Antigen Antibody Non-reactive (6/6/2024)    Quantiferon TB Gold Negative (6/6/2024)       Last lab monitoring completed: 5/19/2025    Immunization History   Pneumococcal  Prevnar-20: 9/24/2024 Up-to-date   Tetanus/Tdap  Up-to-date   Shingrix Up-to-date   RSV (only for >= 60 years old)  Up-to-date   All patients on biologics should avoid live vaccines (varicella/VZV, intranasal influenza, MMR, or yellow fever vaccine (if traveling))         Today's Vitals: There were no vitals taken for this visit.  ----------------    I spent 15 minutes with this patient today. All changes were made via collaborative practice agreement with Valdez Woodson.     A summary of these recommendations was sent via [employer].    Eliz Armendariz, PharmD  Medication Therapy Management Pharmacist  [facility] Rheumatology  Phone: [phone]    Telemedicine Visit Details  The patient's medications can be safely assessed via a telemedicine encounter.  Type of service:  Telephone visit  Originating Location (pt. Location): Home    Distant Location (provider location):  Off-site  Start Time: 10:30 AM  End Time: 10:45 AM     Medication Therapy Recommendations  No medication therapy recommendations to display

## 2025-06-16 NOTE — PATIENT INSTRUCTIONS
"Recommendations from today's MTM visit:                                                      Continue Leflunomide 20 mg once daily   Routine monthly labs needed with medication therapy.  Continue Actemra IV infusion, 6 mg/Kg every 4 weeks   Continue prednisone 5 mg daily     Follow-up: with Dr. Woodson 8/8/2025, with me in around 3 months for follow-up and via [employer] as needed.    It was great speaking with you today.  I value your experience and would be very thankful for your time in providing feedback in our clinic survey. In the next few days, you may receive an email or text message from [employer] with a link to a survey related to your  clinical pharmacist.\"     To schedule another MTM appointment, please call the clinic directly or you may call the MTM scheduling line at [phone].    My Clinical Pharmacist's contact information:                                                      Please feel free to contact me with any questions or concerns you have.      Eliz Armendariz, PharmD  Medication Therapy Management Pharmacist  [facility] Rheumatology  Phone: [phone]   "

## 2025-06-20 ENCOUNTER — TRANSFERRED RECORDS (OUTPATIENT)
Dept: HEALTH INFORMATION MANAGEMENT | Facility: CLINIC | Age: 65
End: 2025-06-20
Payer: COMMERCIAL

## 2025-06-23 RX ORDER — METHYLPREDNISOLONE SODIUM SUCCINATE 125 MG/2ML
40 INJECTION INTRAMUSCULAR; INTRAVENOUS ONCE
OUTPATIENT
Start: 2025-06-23

## 2025-07-02 ENCOUNTER — DOCUMENTATION ONLY (OUTPATIENT)
Dept: RHEUMATOLOGY | Facility: CLINIC | Age: 65
End: 2025-07-02
Payer: COMMERCIAL

## 2025-07-02 NOTE — PROGRESS NOTES
Peer to peer completed today for PET scan and the PET scan is now approved.     Valdez Woodson MD

## 2025-07-07 ENCOUNTER — LAB (OUTPATIENT)
Dept: LAB | Facility: CLINIC | Age: 65
End: 2025-07-07
Payer: COMMERCIAL

## 2025-07-07 DIAGNOSIS — Z79.899 HIGH RISK MEDICATION USE: ICD-10-CM

## 2025-07-07 LAB
AST SERPL W P-5'-P-CCNC: 37 U/L (ref 0–45)
BASOPHILS # BLD AUTO: 0 10E3/UL (ref 0–0.2)
BASOPHILS NFR BLD AUTO: 1 %
CREAT SERPL-MCNC: 0.95 MG/DL (ref 0.67–1.17)
CRP SERPL-MCNC: <3 MG/L
EGFRCR SERPLBLD CKD-EPI 2021: 88 ML/MIN/1.73M2
EOSINOPHIL # BLD AUTO: 0.2 10E3/UL (ref 0–0.7)
EOSINOPHIL NFR BLD AUTO: 2 %
ERYTHROCYTE [DISTWIDTH] IN BLOOD BY AUTOMATED COUNT: 11.9 % (ref 10–15)
ERYTHROCYTE [SEDIMENTATION RATE] IN BLOOD BY WESTERGREN METHOD: 6 MM/HR (ref 0–20)
HCT VFR BLD AUTO: 38 % (ref 40–53)
HGB BLD-MCNC: 13.2 G/DL (ref 13.3–17.7)
IMM GRANULOCYTES # BLD: 0 10E3/UL
IMM GRANULOCYTES NFR BLD: 0 %
LYMPHOCYTES # BLD AUTO: 1.3 10E3/UL (ref 0.8–5.3)
LYMPHOCYTES NFR BLD AUTO: 18 %
MCH RBC QN AUTO: 30.6 PG (ref 26.5–33)
MCHC RBC AUTO-ENTMCNC: 34.7 G/DL (ref 31.5–36.5)
MCV RBC AUTO: 88 FL (ref 78–100)
MONOCYTES # BLD AUTO: 1 10E3/UL (ref 0–1.3)
MONOCYTES NFR BLD AUTO: 15 %
NEUTROPHILS # BLD AUTO: 4.6 10E3/UL (ref 1.6–8.3)
NEUTROPHILS NFR BLD AUTO: 65 %
NRBC # BLD AUTO: 0 10E3/UL
NRBC BLD AUTO-RTO: 0 /100
PLATELET # BLD AUTO: 192 10E3/UL (ref 150–450)
RBC # BLD AUTO: 4.32 10E6/UL (ref 4.4–5.9)
WBC # BLD AUTO: 7.1 10E3/UL (ref 4–11)

## 2025-07-07 PROCEDURE — 84450 TRANSFERASE (AST) (SGOT): CPT

## 2025-07-07 PROCEDURE — 82565 ASSAY OF CREATININE: CPT

## 2025-07-07 PROCEDURE — 86140 C-REACTIVE PROTEIN: CPT

## 2025-07-07 PROCEDURE — 85652 RBC SED RATE AUTOMATED: CPT

## 2025-07-07 PROCEDURE — 85004 AUTOMATED DIFF WBC COUNT: CPT

## 2025-07-07 PROCEDURE — 36415 COLL VENOUS BLD VENIPUNCTURE: CPT

## 2025-07-16 DIAGNOSIS — H57.89 RED EYE: Primary | ICD-10-CM

## 2025-07-17 ENCOUNTER — PATIENT OUTREACH (OUTPATIENT)
Dept: CARE COORDINATION | Facility: CLINIC | Age: 65
End: 2025-07-17
Payer: COMMERCIAL

## 2025-07-21 ENCOUNTER — PATIENT OUTREACH (OUTPATIENT)
Dept: CARE COORDINATION | Facility: CLINIC | Age: 65
End: 2025-07-21
Payer: COMMERCIAL

## 2025-07-31 ENCOUNTER — THERAPY VISIT (OUTPATIENT)
Dept: OCCUPATIONAL THERAPY | Facility: CLINIC | Age: 65
End: 2025-07-31
Attending: FAMILY MEDICINE
Payer: COMMERCIAL

## 2025-07-31 DIAGNOSIS — M17.0 PRIMARY OSTEOARTHRITIS OF BOTH KNEES: ICD-10-CM

## 2025-07-31 DIAGNOSIS — Z74.09 IMPAIRED MOBILITY AND ADLS: Primary | ICD-10-CM

## 2025-07-31 DIAGNOSIS — M35.3 PMR (POLYMYALGIA RHEUMATICA): ICD-10-CM

## 2025-07-31 DIAGNOSIS — M16.12 PRIMARY OSTEOARTHRITIS OF LEFT HIP: ICD-10-CM

## 2025-07-31 DIAGNOSIS — M31.6 GCA (GIANT CELL ARTERITIS) (H): ICD-10-CM

## 2025-07-31 DIAGNOSIS — Z78.9 IMPAIRED MOBILITY AND ADLS: Primary | ICD-10-CM

## 2025-07-31 PROCEDURE — 97542 WHEELCHAIR MNGMENT TRAINING: CPT | Mod: GO | Performed by: OCCUPATIONAL THERAPIST

## 2025-07-31 NOTE — PROGRESS NOTES
"                                                                             SEATING AND WHEELED MOBILITY ASSESSMENT    [facility]  Occupational Therapy   Date of service: July 31, 2025    Referring provider: Valentino Lu MD   Order Date 6/27/25  Onset Date: 6/27/25    Order Details: scooter    Funding:[email or web address]    Vendor: [employer]    Height/Weight: 5'10\" / 192    Medical diagnosis:   Cardiovascular/Peripheral Vascular  Arteriosclerotic Cardiovascular Disease (ASCVD)  CAD (coronary artery disease)  Ischemic chest pain  Abnormal stress test  Coronary artery disease involving native coronary artery of native heart, angina presence unspecified  Chest pain, unspecified type     Endocrine  Hyperlipidemia  Impaired Fasting Glucose     Neurology/Sleep  Syncope and collapse     Rheumatology  GCA (giant cell arteritis) (H)     Orthopedic/Musculoskeletal  Arthralgia, unspecified joint     Patient concerns/goals: scooter    Living environment:  House    Living environment barriers:  Stairs to enter (1 railing present)      Current assistance/living environment:  Lives with spouse    Community Mobility:  Transportation: Car  Community Mobility Requirements: Medical Appointments, Shopping, Work, 45-50 hours a week [employer]  Accessibility Requirements for Community Settings: leisure      Current mobility equipment:  None    Fall Risk Screen:   Has the patient fallen 2 or more times in the last year? No      Has the patient fallen and had an injury in the past year? No     25 ft walk: 8.15    Is the patient a fall risk? Yes, department fall risk interventions implemented    ADL: ind    IADL: shares responsibility with wife    Evaluation     Pain assessment:  Pain present  Hips knees    Balance: WFL    Ambulation:  Level of [place]: Independent  Works full time, but just pushes through- if he stops and rests- its very hard to get back moving again as his joints stiffen and he " is unable to move.  Not able to participate in community mobility and needs scooter to do so.     Neuromuscular:    Coordination:  WFL but slow and painful    Tone:   Hypotonic    Sensation:  Sensory Deficits Reported: WFL    Head and Neck:  Head and Neck Position: WFL  Head Control: wfl  Tone/Movement of Head and Neck: tight end ranges and painful movements    Upper Extremities  UE ROM: WFL with tight end ranges  UE Strength: 4/5    Lower Extremities:  LE ROM: tight end ranges  LE Strength: WFL     Impairments:  Fatigue  Muscle atrophy  Coordination  Balance  Pain     Treatment diagnosis:  Impaired mobility  Impaired activities of daily living       Recommendations/Plan of care:  Occupational therapy intervention for  wheelchair management.    Goals:   Target date:   Patient, family and/or caregiver will verbalize/demonstrate understanding of compensatory methods /equipment to enhance functional independence and safety.    Educational assessment/barriers to learning:  No barriers noted    Treatment provided this date:   Wheelchair management, 55 minutes   Determined need for scooter to aid in community mobility secondary to severe LE pain due to arthritis changes.  SAFe and ind trial today.  Educated on ways to break down for community. Gave private pay options as well.    [employer] 3 wheel  Power Operated Vehicle / Scooter -  This device is being requested for this patient with mobility impairments to allow his to be able to complete all of his community mobility in a safe fashion, without risk of injury from falling, and in a reasonable time frame. He demonstrated during that he was able to transfer to/from the requested scooter, operate the tiller steering system, able to maintain postural stability and position while operating the POV, and operate the on/off mechanism and the speed dial appropriately and safely. They are very willing and physically / cognitively able to use the recommended equipment to assist  with community mobility. There is a mobility limitation that cannot be sufficiently and safely resolved by the use of an appropriately fitted cane or walker and they do not have sufficient upper extremity function to self-propel an optimally-configured manual wheelchair long distances during a typical day due to limitations in strength, endurance, range of motion, and coordination. This equipment is reasonable and necessary with reference to accepted standards of medical practice and treatment of this patient's condition and is not being recommended as a convenience item. Without this recommended equipment, he is highly likely to sustain injuries from falls which those costs far exceed the cost of the requested equipment.    This therapist has no financial connection to the equipment being requested or vendor being used.    I have read and concur with the above recommendations.    Physician Printed Name __________________________________________    Physician Signature  _____________________________________________    Date of Signature ______________________________    Physician Phone  ______________________________    Response to treatment/recommendations: understanding    Goal attainment:  All goals met    Risks and benefits of evaluation/treatment have been explained.  Patient, family and/or caregiver are in agreement with Plan of Care.     Timed Code Treatment Minutes: 55  Total Treatment Time (sum of timed and untimed services): 55    Electronically signed by:  Radha RADFORD, ATP      Occupational Therapist, Assistive   [phone]      fax: [phone]      [email or web address]  [facility], [address]  [place]   [address]  July 31, 2025

## 2025-08-04 ENCOUNTER — LAB (OUTPATIENT)
Dept: LAB | Facility: CLINIC | Age: 65
End: 2025-08-04
Payer: COMMERCIAL

## 2025-08-04 DIAGNOSIS — Z79.899 HIGH RISK MEDICATION USE: ICD-10-CM

## 2025-08-04 DIAGNOSIS — M35.3 PMR (POLYMYALGIA RHEUMATICA): ICD-10-CM

## 2025-08-04 LAB
ALBUMIN SERPL BCG-MCNC: 4.4 G/DL (ref 3.5–5.2)
ALBUMIN UR-MCNC: NEGATIVE MG/DL
ALP SERPL-CCNC: 43 U/L (ref 40–150)
ALT SERPL W P-5'-P-CCNC: 43 U/L (ref 0–70)
ANION GAP SERPL CALCULATED.3IONS-SCNC: 10 MMOL/L (ref 7–15)
APPEARANCE UR: CLEAR
AST SERPL W P-5'-P-CCNC: 33 U/L (ref 0–45)
BASOPHILS # BLD AUTO: 0 10E3/UL (ref 0–0.2)
BASOPHILS NFR BLD AUTO: 1 %
BILIRUB SERPL-MCNC: 0.4 MG/DL
BILIRUB UR QL STRIP: NEGATIVE
BUN SERPL-MCNC: 17.3 MG/DL (ref 8–23)
CALCIUM SERPL-MCNC: 9.4 MG/DL (ref 8.8–10.4)
CHLORIDE SERPL-SCNC: 107 MMOL/L (ref 98–107)
CHOLEST SERPL-MCNC: 167 MG/DL
COLOR UR AUTO: YELLOW
CREAT SERPL-MCNC: 1.01 MG/DL (ref 0.67–1.17)
CRP SERPL-MCNC: <3 MG/L
EGFRCR SERPLBLD CKD-EPI 2021: 82 ML/MIN/1.73M2
EOSINOPHIL # BLD AUTO: 0.2 10E3/UL (ref 0–0.7)
EOSINOPHIL NFR BLD AUTO: 5 %
ERYTHROCYTE [DISTWIDTH] IN BLOOD BY AUTOMATED COUNT: 11.9 % (ref 10–15)
ERYTHROCYTE [SEDIMENTATION RATE] IN BLOOD BY WESTERGREN METHOD: 6 MM/HR (ref 0–20)
FASTING STATUS PATIENT QL REPORTED: YES
GLUCOSE SERPL-MCNC: 93 MG/DL (ref 70–99)
GLUCOSE UR STRIP-MCNC: NEGATIVE MG/DL
HCO3 SERPL-SCNC: 25 MMOL/L (ref 22–29)
HCT VFR BLD AUTO: 40.7 % (ref 40–53)
HDLC SERPL-MCNC: 49 MG/DL
HGB BLD-MCNC: 13.8 G/DL (ref 13.3–17.7)
HGB UR QL STRIP: NEGATIVE
IMM GRANULOCYTES # BLD: 0 10E3/UL
IMM GRANULOCYTES NFR BLD: 0 %
KETONES UR STRIP-MCNC: NEGATIVE MG/DL
LDLC SERPL CALC-MCNC: 89 MG/DL
LEUKOCYTE ESTERASE UR QL STRIP: NEGATIVE
LYMPHOCYTES # BLD AUTO: 1.7 10E3/UL (ref 0.8–5.3)
LYMPHOCYTES NFR BLD AUTO: 47 %
MCH RBC QN AUTO: 30.4 PG (ref 26.5–33)
MCHC RBC AUTO-ENTMCNC: 33.9 G/DL (ref 31.5–36.5)
MCV RBC AUTO: 90 FL (ref 78–100)
MONOCYTES # BLD AUTO: 0.6 10E3/UL (ref 0–1.3)
MONOCYTES NFR BLD AUTO: 17 %
MUCOUS THREADS #/AREA URNS LPF: PRESENT /LPF
NEUTROPHILS # BLD AUTO: 1.1 10E3/UL (ref 1.6–8.3)
NEUTROPHILS NFR BLD AUTO: 30 %
NITRATE UR QL: NEGATIVE
NONHDLC SERPL-MCNC: 118 MG/DL
NRBC # BLD AUTO: 0 10E3/UL
NRBC BLD AUTO-RTO: 0 /100
PH UR STRIP: 5.5 [PH] (ref 5–7)
PLATELET # BLD AUTO: 167 10E3/UL (ref 150–450)
POTASSIUM SERPL-SCNC: 4 MMOL/L (ref 3.4–5.3)
PROT SERPL-MCNC: 6.3 G/DL (ref 6.4–8.3)
RBC # BLD AUTO: 4.54 10E6/UL (ref 4.4–5.9)
RBC URINE: 1 /HPF
SODIUM SERPL-SCNC: 142 MMOL/L (ref 135–145)
SP GR UR STRIP: 1.02 (ref 1–1.03)
SQUAMOUS EPITHELIAL: <1 /HPF
TRIGL SERPL-MCNC: 143 MG/DL
UROBILINOGEN UR STRIP-MCNC: NORMAL MG/DL
WBC # BLD AUTO: 3.7 10E3/UL (ref 4–11)
WBC URINE: 1 /HPF

## 2025-08-04 PROCEDURE — 85652 RBC SED RATE AUTOMATED: CPT

## 2025-08-04 PROCEDURE — 82947 ASSAY GLUCOSE BLOOD QUANT: CPT

## 2025-08-04 PROCEDURE — 36415 COLL VENOUS BLD VENIPUNCTURE: CPT

## 2025-08-04 PROCEDURE — 83718 ASSAY OF LIPOPROTEIN: CPT

## 2025-08-04 PROCEDURE — 85004 AUTOMATED DIFF WBC COUNT: CPT

## 2025-08-04 PROCEDURE — 81001 URINALYSIS AUTO W/SCOPE: CPT

## 2025-08-04 PROCEDURE — 86140 C-REACTIVE PROTEIN: CPT

## 2025-08-07 ENCOUNTER — TELEPHONE (OUTPATIENT)
Dept: FAMILY MEDICINE | Facility: CLINIC | Age: 65
End: 2025-08-07
Payer: COMMERCIAL

## 2025-08-09 ENCOUNTER — MEDICAL CORRESPONDENCE (OUTPATIENT)
Dept: HEALTH INFORMATION MANAGEMENT | Facility: CLINIC | Age: 65
End: 2025-08-09
Payer: COMMERCIAL

## 2025-08-12 ENCOUNTER — VIRTUAL VISIT (OUTPATIENT)
Dept: PHARMACY | Facility: CLINIC | Age: 65
End: 2025-08-12
Attending: STUDENT IN AN ORGANIZED HEALTH CARE EDUCATION/TRAINING PROGRAM
Payer: COMMERCIAL

## 2025-08-12 DIAGNOSIS — I77.6 AORTITIS: ICD-10-CM

## 2025-08-12 DIAGNOSIS — M31.6 GCA (GIANT CELL ARTERITIS) (H): Primary | ICD-10-CM

## 2025-08-12 DIAGNOSIS — M31.8 SYSTEMIC VASCULITIS (H): ICD-10-CM

## 2025-08-12 RX ORDER — TOCILIZUMAB 180 MG/ML
162 INJECTION, SOLUTION SUBCUTANEOUS
Qty: 3.6 ML | Refills: 5 | OUTPATIENT
Start: 2025-08-12

## 2025-08-15 ENCOUNTER — TRANSFERRED RECORDS (OUTPATIENT)
Dept: HEALTH INFORMATION MANAGEMENT | Facility: CLINIC | Age: 65
End: 2025-08-15
Payer: COMMERCIAL

## 2025-09-02 ENCOUNTER — LAB (OUTPATIENT)
Dept: LAB | Facility: CLINIC | Age: 65
End: 2025-09-02
Payer: COMMERCIAL

## 2025-09-02 DIAGNOSIS — Z79.899 HIGH RISK MEDICATION USE: ICD-10-CM

## 2025-09-02 LAB
AST SERPL W P-5'-P-CCNC: 27 U/L (ref 0–45)
BASOPHILS # BLD AUTO: 0.03 10E3/UL (ref 0–0.2)
BASOPHILS NFR BLD AUTO: 0.6 %
CREAT SERPL-MCNC: 1.04 MG/DL (ref 0.67–1.17)
CRP SERPL-MCNC: <3 MG/L
EGFRCR SERPLBLD CKD-EPI 2021: 79 ML/MIN/1.73M2
EOSINOPHIL # BLD AUTO: 0.19 10E3/UL (ref 0–0.7)
EOSINOPHIL NFR BLD AUTO: 3.6 %
ERYTHROCYTE [DISTWIDTH] IN BLOOD BY AUTOMATED COUNT: 13.2 % (ref 10–15)
ERYTHROCYTE [SEDIMENTATION RATE] IN BLOOD BY WESTERGREN METHOD: 6 MM/HR (ref 0–20)
HCT VFR BLD AUTO: 40.1 % (ref 40–53)
HGB BLD-MCNC: 13.4 G/DL (ref 13.3–17.7)
IMM GRANULOCYTES # BLD: <0.03 10E3/UL
IMM GRANULOCYTES NFR BLD: 0.4 %
LYMPHOCYTES # BLD AUTO: 1.39 10E3/UL (ref 0.8–5.3)
LYMPHOCYTES NFR BLD AUTO: 26.4 %
MCH RBC QN AUTO: 30.6 PG (ref 26.5–33)
MCHC RBC AUTO-ENTMCNC: 33.4 G/DL (ref 31.5–36.5)
MCV RBC AUTO: 91.6 FL (ref 78–100)
MONOCYTES # BLD AUTO: 0.95 10E3/UL (ref 0–1.3)
MONOCYTES NFR BLD AUTO: 18 %
NEUTROPHILS # BLD AUTO: 2.69 10E3/UL (ref 1.6–8.3)
NEUTROPHILS NFR BLD AUTO: 51 %
NRBC # BLD AUTO: <0.03 10E3/UL
NRBC BLD AUTO-RTO: 0 /100
PLATELET # BLD AUTO: 161 10E3/UL (ref 150–450)
RBC # BLD AUTO: 4.38 10E6/UL (ref 4.4–5.9)
WBC # BLD AUTO: 5.27 10E3/UL (ref 4–11)

## 2025-09-02 PROCEDURE — 82565 ASSAY OF CREATININE: CPT

## 2025-09-02 PROCEDURE — 85014 HEMATOCRIT: CPT

## 2025-09-02 PROCEDURE — 85652 RBC SED RATE AUTOMATED: CPT

## 2025-09-02 PROCEDURE — 84450 TRANSFERASE (AST) (SGOT): CPT

## 2025-09-02 PROCEDURE — 86140 C-REACTIVE PROTEIN: CPT

## 2025-09-02 PROCEDURE — 36415 COLL VENOUS BLD VENIPUNCTURE: CPT
